# Patient Record
Sex: MALE | Race: OTHER | Employment: PART TIME | URBAN - METROPOLITAN AREA
[De-identification: names, ages, dates, MRNs, and addresses within clinical notes are randomized per-mention and may not be internally consistent; named-entity substitution may affect disease eponyms.]

---

## 2020-01-06 ENCOUNTER — HOSPITAL ENCOUNTER (OUTPATIENT)
Age: 70
Discharge: HOME | End: 2020-01-08
Payer: MEDICARE

## 2020-01-06 LAB — HBA1C MFR BLD: 7.6 % (ref 4–5.6)

## 2020-01-06 PROCEDURE — 83036 HEMOGLOBIN GLYCOSYLATED A1C: CPT

## 2020-12-18 ENCOUNTER — HOSPITAL ENCOUNTER (EMERGENCY)
Age: 70
Discharge: HOME | DRG: 177 | End: 2020-12-18
Attending: EMERGENCY MEDICINE
Payer: MEDICARE

## 2020-12-18 ENCOUNTER — APPOINTMENT (OUTPATIENT)
Dept: GENERAL RADIOLOGY | Age: 70
DRG: 177 | End: 2020-12-18
Payer: MEDICARE

## 2020-12-18 VITALS
BODY MASS INDEX: 28.49 KG/M2 | DIASTOLIC BLOOD PRESSURE: 72 MMHG | RESPIRATION RATE: 23 BRPM | TEMPERATURE: 98 F | SYSTOLIC BLOOD PRESSURE: 138 MMHG | OXYGEN SATURATION: 96 % | HEIGHT: 73 IN | HEART RATE: 74 BPM | WEIGHT: 215 LBS

## 2020-12-18 LAB
ALBUMIN SERPL-MCNC: 3.7 G/DL (ref 3.5–5.2)
ALP BLD-CCNC: 79 U/L (ref 40–129)
ALT SERPL-CCNC: 21 U/L (ref 0–40)
ANION GAP SERPL CALCULATED.3IONS-SCNC: 13 MMOL/L (ref 7–16)
AST SERPL-CCNC: 44 U/L (ref 0–39)
BASOPHILS ABSOLUTE: 0.03 E9/L (ref 0–0.2)
BASOPHILS RELATIVE PERCENT: 0.4 % (ref 0–2)
BILIRUB SERPL-MCNC: 0.5 MG/DL (ref 0–1.2)
BUN BLDV-MCNC: 22 MG/DL (ref 8–23)
CALCIUM SERPL-MCNC: 8.3 MG/DL (ref 8.6–10.2)
CHLORIDE BLD-SCNC: 98 MMOL/L (ref 98–107)
CO2: 21 MMOL/L (ref 22–29)
CREAT SERPL-MCNC: 1.1 MG/DL (ref 0.7–1.2)
EKG ATRIAL RATE: 72 BPM
EKG P AXIS: 4 DEGREES
EKG P-R INTERVAL: 166 MS
EKG Q-T INTERVAL: 390 MS
EKG QRS DURATION: 92 MS
EKG QTC CALCULATION (BAZETT): 427 MS
EKG R AXIS: -42 DEGREES
EKG T AXIS: 6 DEGREES
EKG VENTRICULAR RATE: 72 BPM
EOSINOPHILS ABSOLUTE: 0 E9/L (ref 0.05–0.5)
EOSINOPHILS RELATIVE PERCENT: 0 % (ref 0–6)
GFR AFRICAN AMERICAN: >60
GFR NON-AFRICAN AMERICAN: >60 ML/MIN/1.73
GLUCOSE BLD-MCNC: 195 MG/DL (ref 74–99)
HCT VFR BLD CALC: 41 % (ref 37–54)
HEMOGLOBIN: 13.1 G/DL (ref 12.5–16.5)
IMMATURE GRANULOCYTES #: 0.04 E9/L
IMMATURE GRANULOCYTES %: 0.5 % (ref 0–5)
LYMPHOCYTES ABSOLUTE: 1.42 E9/L (ref 1.5–4)
LYMPHOCYTES RELATIVE PERCENT: 17.2 % (ref 20–42)
MCH RBC QN AUTO: 24.1 PG (ref 26–35)
MCHC RBC AUTO-ENTMCNC: 32 % (ref 32–34.5)
MCV RBC AUTO: 75.4 FL (ref 80–99.9)
MONOCYTES ABSOLUTE: 0.79 E9/L (ref 0.1–0.95)
MONOCYTES RELATIVE PERCENT: 9.6 % (ref 2–12)
NEUTROPHILS ABSOLUTE: 5.96 E9/L (ref 1.8–7.3)
NEUTROPHILS RELATIVE PERCENT: 72.3 % (ref 43–80)
PDW BLD-RTO: 15.2 FL (ref 11.5–15)
PLATELET # BLD: 260 E9/L (ref 130–450)
PMV BLD AUTO: 11.5 FL (ref 7–12)
POTASSIUM REFLEX MAGNESIUM: 5.8 MMOL/L (ref 3.5–5)
POTASSIUM SERPL-SCNC: 4.2 MMOL/L (ref 3.5–5)
RBC # BLD: 5.44 E12/L (ref 3.8–5.8)
SARS-COV-2, NAAT: DETECTED
SODIUM BLD-SCNC: 132 MMOL/L (ref 132–146)
TOTAL PROTEIN: 7.2 G/DL (ref 6.4–8.3)
TROPONIN: <0.01 NG/ML (ref 0–0.03)
WBC # BLD: 8.2 E9/L (ref 4.5–11.5)

## 2020-12-18 PROCEDURE — 93005 ELECTROCARDIOGRAM TRACING: CPT | Performed by: EMERGENCY MEDICINE

## 2020-12-18 PROCEDURE — 2580000003 HC RX 258: Performed by: EMERGENCY MEDICINE

## 2020-12-18 PROCEDURE — 71045 X-RAY EXAM CHEST 1 VIEW: CPT

## 2020-12-18 PROCEDURE — 93010 ELECTROCARDIOGRAM REPORT: CPT | Performed by: INTERNAL MEDICINE

## 2020-12-18 PROCEDURE — 99284 EMERGENCY DEPT VISIT MOD MDM: CPT

## 2020-12-18 PROCEDURE — 85025 COMPLETE CBC W/AUTO DIFF WBC: CPT

## 2020-12-18 PROCEDURE — 84132 ASSAY OF SERUM POTASSIUM: CPT

## 2020-12-18 PROCEDURE — U0002 COVID-19 LAB TEST NON-CDC: HCPCS

## 2020-12-18 PROCEDURE — 80053 COMPREHEN METABOLIC PANEL: CPT

## 2020-12-18 PROCEDURE — 84484 ASSAY OF TROPONIN QUANT: CPT

## 2020-12-18 RX ORDER — 0.9 % SODIUM CHLORIDE 0.9 %
1000 INTRAVENOUS SOLUTION INTRAVENOUS ONCE
Status: COMPLETED | OUTPATIENT
Start: 2020-12-18 | End: 2020-12-18

## 2020-12-18 RX ORDER — AZITHROMYCIN 250 MG/1
TABLET, FILM COATED ORAL
Qty: 1 PACKET | Refills: 0 | Status: ON HOLD | OUTPATIENT
Start: 2020-12-18 | End: 2020-12-26 | Stop reason: HOSPADM

## 2020-12-18 RX ADMIN — SODIUM CHLORIDE 1000 ML: 9 INJECTION, SOLUTION INTRAVENOUS at 11:54

## 2020-12-18 NOTE — ED PROVIDER NOTES
HPI:  12/18/20, Time: 11:07 AM IMAN Erickson is a 69 y.o. male presenting to the ED for generalized fatigue, beginning 2 weeks ago. The complaint has been persistent, mild in severity, and worsened by nothing. Patient says that his significant other tested positive for COVID 2 weeks ago. He was never tested but has similar symptoms to her. He denies any chest pain, shortness of breath, cough. Has been eating and drinking. Denies any medical problems. Not on any medications. Only complaint is fatigue. ROS:   Pertinent positives and negatives are stated within HPI, all other systems reviewed and are negative.  --------------------------------------------- PAST HISTORY ---------------------------------------------  Past Medical History:  has no past medical history on file. Past Surgical History:  has no past surgical history on file. Social History:      Family History: family history is not on file. The patients home medications have been reviewed. Allergies: Patient has no known allergies. ---------------------------------------------------PHYSICAL EXAM--------------------------------------    Constitutional/General: Alert and oriented x3, well appearing, non toxic in NAD  Head: Normocephalic and atraumatic  Eyes: PERRL, EOMI  Mouth: Oropharynx clear, handling secretions, no trismus  Neck: Supple, full ROM, non tender to palpation in the midline, no stridor, no crepitus, no meningeal signs  Pulmonary: Lungs clear to auscultation bilaterally, no wheezes, rales, or rhonchi. Not in respiratory distress  Cardiovascular:  Regular rate. Regular rhythm. No murmurs, gallops, or rubs. 2+ distal pulses  Chest: no chest wall tenderness  Abdomen: Soft. Non tender. Non distended. +BS. No rebound, guarding, or rigidity. No pulsatile masses appreciated. Musculoskeletal: Moves all extremities x 4. Warm and well perfused, no clubbing, cyanosis, or edema.  Capillary refill <3 seconds  Skin: warm and dry. No rashes. Neurologic: GCS 15, CN 2-12 grossly intact, no focal deficits, symmetric strength 5/5 in the upper and lower extremities bilaterally  Psych: Normal Affect    -------------------------------------------------- RESULTS -------------------------------------------------  I have personally reviewed all laboratory and imaging results for this patient. Results are listed below.      LABS:  Results for orders placed or performed during the hospital encounter of 12/18/20   CBC Auto Differential   Result Value Ref Range    WBC 8.2 4.5 - 11.5 E9/L    RBC 5.44 3.80 - 5.80 E12/L    Hemoglobin 13.1 12.5 - 16.5 g/dL    Hematocrit 41.0 37.0 - 54.0 %    MCV 75.4 (L) 80.0 - 99.9 fL    MCH 24.1 (L) 26.0 - 35.0 pg    MCHC 32.0 32.0 - 34.5 %    RDW 15.2 (H) 11.5 - 15.0 fL    Platelets 691 363 - 908 E9/L    MPV 11.5 7.0 - 12.0 fL    Neutrophils % 72.3 43.0 - 80.0 %    Immature Granulocytes % 0.5 0.0 - 5.0 %    Lymphocytes % 17.2 (L) 20.0 - 42.0 %    Monocytes % 9.6 2.0 - 12.0 %    Eosinophils % 0.0 0.0 - 6.0 %    Basophils % 0.4 0.0 - 2.0 %    Neutrophils Absolute 5.96 1.80 - 7.30 E9/L    Immature Granulocytes # 0.04 E9/L    Lymphocytes Absolute 1.42 (L) 1.50 - 4.00 E9/L    Monocytes Absolute 0.79 0.10 - 0.95 E9/L    Eosinophils Absolute 0.00 (L) 0.05 - 0.50 E9/L    Basophils Absolute 0.03 0.00 - 0.20 E9/L   Comprehensive Metabolic Panel w/ Reflex to MG   Result Value Ref Range    Sodium 132 132 - 146 mmol/L    Potassium reflex Magnesium 5.8 (H) 3.5 - 5.0 mmol/L    Chloride 98 98 - 107 mmol/L    CO2 21 (L) 22 - 29 mmol/L    Anion Gap 13 7 - 16 mmol/L    Glucose 195 (H) 74 - 99 mg/dL    BUN 22 8 - 23 mg/dL    CREATININE 1.1 0.7 - 1.2 mg/dL    GFR Non-African American >60 >=60 mL/min/1.73    GFR African American >60     Calcium 8.3 (L) 8.6 - 10.2 mg/dL    Total Protein 7.2 6.4 - 8.3 g/dL    Alb 3.7 3.5 - 5.2 g/dL    Total Bilirubin 0.5 0.0 - 1.2 mg/dL    Alkaline Phosphatase 79 40 - 129 U/L    ALT 21 0 - 40 U/L    AST 44 (H) 0 - 39 U/L   Troponin   Result Value Ref Range    Troponin <0.01 0.00 - 0.03 ng/mL   COVID-19   Result Value Ref Range    SARS-CoV-2, NAAT DETECTED (A) Not Detected   Potassium   Result Value Ref Range    Potassium 4.2 3.5 - 5.0 mmol/L   EKG 12 Lead   Result Value Ref Range    Ventricular Rate 72 BPM    Atrial Rate 72 BPM    P-R Interval 166 ms    QRS Duration 92 ms    Q-T Interval 390 ms    QTc Calculation (Bazett) 427 ms    P Axis 4 degrees    R Axis -42 degrees    T Axis 6 degrees       RADIOLOGY:  Interpreted by Radiologist.  XR CHEST PORTABLE   Final Result   1. Patchy bilateral opacities, consistent with pneumonia. 2. Moderate size hiatus hernia. EKG Interpretation  Interpreted by emergency department physician    Rhythm: normal sinus   Rate: normal  Axis: left  Conduction: normal  ST Segments: no acute change  T Waves: no acute change    Clinical Impression: no acute changes  Comparison to prior EKG: None      ------------------------- NURSING NOTES AND VITALS REVIEWED ---------------------------   The nursing notes within the ED encounter and vital signs as below have been reviewed by myself. /83   Pulse 76   Temp 98 °F (36.7 °C) (Temporal)   Resp 20   Ht 6' 1\" (1.854 m)   Wt 215 lb (97.5 kg)   SpO2 97%   BMI 28.37 kg/m²   Oxygen Saturation Interpretation: Normal    The patients available past medical records and past encounters were reviewed. ------------------------------ ED COURSE/MEDICAL DECISION MAKING----------------------  Medications   0.9 % sodium chloride bolus (1,000 mLs Intravenous New Bag 12/18/20 1154)             Medical Decision Making:    Patient's vitals are stable. Oxygen saturation is 97% on RA. Patient complains of fatigue. Labs and imaging obtained. COVID positive. CXR shows infiltrates. Likely related to COVID but I will start the patient on azithromycin.  I instructed the patient to take the medication prescribed as directed, to follow up with his PCP this week, and to return for worsening symptoms. Re-Evaluations:             Re-evaluation. Patients symptoms are improving        This patient's ED course included: a personal history and physicial examination, re-evaluation prior to disposition, multiple bedside re-evaluations, IV medications, cardiac monitoring, continuous pulse oximetry and a personal history and physicial eaxmination    This patient has remained hemodynamically stable during their ED course. Counseling: The emergency provider has spoken with the patient and discussed todays results, in addition to providing specific details for the plan of care and counseling regarding the diagnosis and prognosis. Questions are answered at this time and they are agreeable with the plan.       --------------------------------- IMPRESSION AND DISPOSITION ---------------------------------    IMPRESSION  1. COVID-19        DISPOSITION  Disposition: Discharge to home  Patient condition is good        NOTE: This report was transcribed using voice recognition software.  Every effort was made to ensure accuracy; however, inadvertent computerized transcription errors may be present          Desire Gonzalez MD  12/18/20 8418       Desire Gonzalez MD  12/26/20 0035

## 2020-12-19 ENCOUNTER — CARE COORDINATION (OUTPATIENT)
Dept: CARE COORDINATION | Age: 70
End: 2020-12-19

## 2020-12-20 ENCOUNTER — CARE COORDINATION (OUTPATIENT)
Dept: CARE COORDINATION | Age: 70
End: 2020-12-20

## 2020-12-20 NOTE — CARE COORDINATION
Patient contacted regarding Ermelinda Izaiahas. Discussed COVID-19 related testing which was available at this time. Test results were positive. Patient informed of results, if available? Yes    Care Transition Nurse/ Ambulatory Care Manager contacted the patient by telephone to perform post discharge assessment. Call within 2 business days of discharge: Yes. Verified name and  with patient as identifiers. Provided introduction to self, and explanation of the CTN/ACM role, and reason for call due to risk factors for infection and/or exposure to COVID-19. Symptoms reviewed with patient who verbalized the following symptoms: fatigue and shortness of breath. Patient states his symptoms persist.   ACM reviewed s/s of respiratory distress and when to contact a healthcare provider with patient. Patient states understanding. Due to no new or worsening symptoms encounter was not routed to provider for escalation. Discussed follow-up appointments. If no appointment was previously scheduled, appointment scheduling offered: Yes  [facility] follow up appointment(s): No future appointments. Non-[facility] follow up appointment(s):   Patient does not have a PCP at this time. ACM gave him the information for the Pre-service center and the information for the [facility] on [address].   Patient will call this ACM if he needs assistance in scheduling a follow up appointment. Non-face-to-face services provided:  Reviewed AVS, gave information for the [facility], [facility] in [address] [place] [address] Drive:   Does patient have an Advance Directive:  decision maker updated. Patient has following risk factors of: pneumonia. CTN/ACM reviewed discharge instructions, medical action plan and red flags such as increased shortness of breath, increasing fever and signs of decompensation with patient who verbalized understanding.    Discussed exposure protocols and quarantine with CDC Guidelines What to do if you are sick with coronavirus disease 2019.  Patient was given an opportunity for questions and concerns. The patient agrees to contact the [facility] exposure line [phone], local [facility] department [place] [facility]: ([phone]) and PCP office for questions related to their healthcare. CTN/ACM provided contact information for future needs. Reviewed and educated patient on any new and changed medications related to discharge diagnosis     Patient states he is taking the medication (Zithromax) prescribed by the ED provider. Per patient request, ACM did send the link to sign up for my chart to his email address and also gave him the contact information for My Chart staff to call if he has problems activating his account. Patient/family/caregiver given information for [employer] and agrees to enroll yes  Patient's preferred e-mail: [email or web address]   Patient's preferred phone number: [phone]  Based on Loop alert triggers, patient will be contacted by nurse care manager for worsening symptoms. Pt will be further monitored by COVID Loop Team based on severity of symptoms and risk factors. ACM reviewed self quarantine recommendations d/t COVID test results are pending. Steps to help prevent the spread of COVID-19 if you are sick  SOURCE - https://gray-cole.info/. html     Stay home except to get medical care   ; Stay home: People who are mildly ill with COVID-19 are able to isolate at home during their illness. You should restrict activities outside your home, except for getting medical care.   ; Avoid public areas: Do not go to work, school, or public areas.   ; Avoid public transportation: Avoid using public transportation, ride-sharing, or taxis.  ; Separate yourself from other people and animals in your home   ; Stay away from others: As much as possible, you should stay in a specific room and away from other people in your home.  Also, you should use a separate bathroom, if available.   ; Limit contact with pets & animals: You should restrict contact with pets and other animals while you are sick with COVID-19, just like you would around other people. Although there have not been reports of pets or other animals becoming sick with COVID-19, it is still recommended that people sick with COVID-19 limit contact with animals until more information is known about the virus. ; When possible, have another member of your household care for your animals while you are sick. If you are sick with COVID-19, avoid contact with your pet, including petting, snuggling, being kissed or licked, and sharing food. If you must care for your pet or be around animals while you are sick, wash your hands before and after you interact with pets and wear a facemask. See COVID-19 and Animals for more information. Other considerations   The ill person should eat/be fed in their room if possible. Non-disposable  items used should be handled with gloves and washed with hot water or in a . Clean hands after handling used  items.  If possible, dedicate a lined trash can for the ill person. Use gloves when removing garbage bags, handling, and disposing of trash. Wash hands after handling or disposing of trash.  Consider consulting with your local health department about trash disposal guidance if available. Information for Household Members and Caregivers of Someone who is Sick   Call ahead before visiting your doctor   Call ahead: If you have a medical appointment, call the healthcare provider and tell them that you have or may have COVID-19. This will help the healthcare provider's office take steps to keep other people from getting infected or exposed. Wear a facemask if you are sick   ;  If you are sick: You should wear a facemask when you are around other people (e.g., sharing a room or vehicle) or pets and before you enter a healthcare provider's office. ; If you are caring for others: If the person who is sick is not able to wear a facemask (for example, because it causes trouble breathing), then people who live with the person who is sick should not stay in the same room with them, or they should wear a facemask if they enter a room with the person who is sick. Cover your coughs and sneezes   ; Cover: Cover your mouth and nose with a tissue when you cough or sneeze.   ; Dispose: Throw used tissues in a lined trash can.   ; Wash hands: Immediately wash your hands with soap and water for at least 20 seconds or, if soap and water are not available, clean your hands with an alcohol-based hand  that contains at least 60% alcohol. Clean your hands often   ; Wash hands: Wash your hands often with soap and water for at least 20 seconds, especially after blowing your nose, coughing, or sneezing; going to the bathroom; and before eating or preparing food.   ; Hand : If soap and water are not readily available, use an alcohol-based hand  with at least 60% alcohol, covering all surfaces of your hands and rubbing them together until they feel dry.   ; Soap and water: Soap and water are the best option if hands are visibly dirty.   ; Avoid touching: Avoid touching your eyes, nose, and mouth with unwashed hands. Handwashing Tips   ; Wet your hands with clean, running water (warm or cold), turn off the tap, and apply soap.  ; Lather your hands by rubbing them together with the soap. Lather the backs of your hands, between your fingers, and under your nails. ; Scrub your hands for at least 20 seconds. Need a timer? Hum the [place] from beginning to end twice.  ; Rinse your hands well under clean, running water.  ; Dry your hands using a clean towel or air dry them. Avoid sharing personal household items   ; Do not share:  You should not share dishes, drinking glasses, cups, eating utensils, towels, or bedding with other people or pets in your home.   ; Wash thoroughly after use: After using these items, they should be washed thoroughly with soap and water. Clean all high-touch surfaces everyday   ; Clean and disinfect: Practice routine cleaning of high touch surfaces.  ; High touch surfaces include counters, tabletops, doorknobs, bathroom fixtures, toilets, phones, keyboards, tablets, and bedside tables.  ; Disinfect areas with bodily fluids: Also, clean any surfaces that may have blood, stool, or body fluids on them.   ; Household : Use a household cleaning spray or wipe, according to the label instructions. Labels contain instructions for safe and effective use of the cleaning product including precautions you should take when applying the product, such as wearing gloves and making sure you have good ventilation during use of the product. Monitor your symptoms   Seek medical attention: Seek prompt medical attention if your illness is worsening     (e.g., difficulty breathing).   ; Call your doctor: Before seeking care, call your healthcare provider and tell them that you have, or are being evaluated for, COVID-19.   ; Wear a facemask when sick: Put on a facemask before you enter the facility. These steps will help the healthcare provider's office to keep other people in the office or waiting room from getting infected or exposed. ; Alert health department: Ask your healthcare provider to call the local or state health department. Persons who are placed under active monitoring or facilitated self-monitoring should follow instructions provided by their local health department or occupational health professionals, as appropriate.  ; Call 911 if you have a medical emergency: If you have a medical emergency and need to call 911, notify the dispatch personnel that you have, or are being evaluated for COVID-19. If possible, put on a facemask before emergency medical services arrive.

## 2020-12-21 ENCOUNTER — APPOINTMENT (OUTPATIENT)
Dept: CT IMAGING | Age: 70
DRG: 177 | End: 2020-12-21
Payer: MEDICARE

## 2020-12-21 ENCOUNTER — HOSPITAL ENCOUNTER (INPATIENT)
Age: 70
LOS: 7 days | Discharge: HOME | DRG: 177 | End: 2020-12-28
Attending: EMERGENCY MEDICINE | Admitting: FAMILY MEDICINE
Payer: MEDICARE

## 2020-12-21 ENCOUNTER — APPOINTMENT (OUTPATIENT)
Dept: GENERAL RADIOLOGY | Age: 70
DRG: 177 | End: 2020-12-21
Payer: MEDICARE

## 2020-12-21 PROBLEM — J12.82 PNEUMONIA DUE TO COVID-19 VIRUS: Status: ACTIVE | Noted: 2020-12-21

## 2020-12-21 PROBLEM — U07.1 PNEUMONIA DUE TO COVID-19 VIRUS: Status: ACTIVE | Noted: 2020-12-21

## 2020-12-21 LAB
ALBUMIN SERPL-MCNC: 3.1 G/DL (ref 3.5–5.2)
ALP BLD-CCNC: 80 U/L (ref 40–129)
ALT SERPL-CCNC: 18 U/L (ref 0–40)
ANION GAP SERPL CALCULATED.3IONS-SCNC: 16 MMOL/L (ref 7–16)
AST SERPL-CCNC: 33 U/L (ref 0–39)
BASOPHILS ABSOLUTE: 0.02 E9/L (ref 0–0.2)
BASOPHILS RELATIVE PERCENT: 0.1 % (ref 0–2)
BILIRUB SERPL-MCNC: 0.5 MG/DL (ref 0–1.2)
BUN BLDV-MCNC: 24 MG/DL (ref 8–23)
CALCIUM SERPL-MCNC: 8.7 MG/DL (ref 8.6–10.2)
CHLORIDE BLD-SCNC: 100 MMOL/L (ref 98–107)
CO2: 19 MMOL/L (ref 22–29)
CREAT SERPL-MCNC: 1.1 MG/DL (ref 0.7–1.2)
D DIMER: 2713 NG/ML DDU
EOSINOPHILS ABSOLUTE: 0 E9/L (ref 0.05–0.5)
EOSINOPHILS RELATIVE PERCENT: 0 % (ref 0–6)
GFR AFRICAN AMERICAN: >60
GFR NON-AFRICAN AMERICAN: >60 ML/MIN/1.73
GLUCOSE BLD-MCNC: 278 MG/DL (ref 74–99)
HCT VFR BLD CALC: 41.1 % (ref 37–54)
HEMOGLOBIN: 12.9 G/DL (ref 12.5–16.5)
IMMATURE GRANULOCYTES #: 0.1 E9/L
IMMATURE GRANULOCYTES %: 0.7 % (ref 0–5)
LYMPHOCYTES ABSOLUTE: 0.82 E9/L (ref 1.5–4)
LYMPHOCYTES RELATIVE PERCENT: 6.1 % (ref 20–42)
MCH RBC QN AUTO: 23.8 PG (ref 26–35)
MCHC RBC AUTO-ENTMCNC: 31.4 % (ref 32–34.5)
MCV RBC AUTO: 76 FL (ref 80–99.9)
METER GLUCOSE: 325 MG/DL (ref 74–99)
METER GLUCOSE: 349 MG/DL (ref 74–99)
MONOCYTES ABSOLUTE: 1 E9/L (ref 0.1–0.95)
MONOCYTES RELATIVE PERCENT: 7.4 % (ref 2–12)
NEUTROPHILS ABSOLUTE: 11.55 E9/L (ref 1.8–7.3)
NEUTROPHILS RELATIVE PERCENT: 85.7 % (ref 43–80)
PDW BLD-RTO: 15 FL (ref 11.5–15)
PLATELET # BLD: 299 E9/L (ref 130–450)
PMV BLD AUTO: 11.5 FL (ref 7–12)
POTASSIUM SERPL-SCNC: 5.1 MMOL/L (ref 3.5–5)
PRO-BNP: 72 PG/ML (ref 0–125)
PROCALCITONIN: 0.09 NG/ML (ref 0–0.08)
RBC # BLD: 5.41 E12/L (ref 3.8–5.8)
SODIUM BLD-SCNC: 135 MMOL/L (ref 132–146)
TOTAL PROTEIN: 6.8 G/DL (ref 6.4–8.3)
TROPONIN: <0.01 NG/ML (ref 0–0.03)
WBC # BLD: 13.5 E9/L (ref 4.5–11.5)

## 2020-12-21 PROCEDURE — 99283 EMERGENCY DEPT VISIT LOW MDM: CPT

## 2020-12-21 PROCEDURE — 6360000002 HC RX W HCPCS: Performed by: EMERGENCY MEDICINE

## 2020-12-21 PROCEDURE — 71045 X-RAY EXAM CHEST 1 VIEW: CPT

## 2020-12-21 PROCEDURE — 85025 COMPLETE CBC W/AUTO DIFF WBC: CPT

## 2020-12-21 PROCEDURE — 83880 ASSAY OF NATRIURETIC PEPTIDE: CPT

## 2020-12-21 PROCEDURE — 6370000000 HC RX 637 (ALT 250 FOR IP): Performed by: FAMILY MEDICINE

## 2020-12-21 PROCEDURE — 6360000002 HC RX W HCPCS: Performed by: FAMILY MEDICINE

## 2020-12-21 PROCEDURE — 82962 GLUCOSE BLOOD TEST: CPT

## 2020-12-21 PROCEDURE — 84484 ASSAY OF TROPONIN QUANT: CPT

## 2020-12-21 PROCEDURE — 2500000003 HC RX 250 WO HCPCS: Performed by: FAMILY MEDICINE

## 2020-12-21 PROCEDURE — 80053 COMPREHEN METABOLIC PANEL: CPT

## 2020-12-21 PROCEDURE — 6360000004 HC RX CONTRAST MEDICATION: Performed by: RADIOLOGY

## 2020-12-21 PROCEDURE — 2060000000 HC ICU INTERMEDIATE R&B

## 2020-12-21 PROCEDURE — 85378 FIBRIN DEGRADE SEMIQUANT: CPT

## 2020-12-21 PROCEDURE — 93005 ELECTROCARDIOGRAM TRACING: CPT | Performed by: EMERGENCY MEDICINE

## 2020-12-21 PROCEDURE — 84145 PROCALCITONIN (PCT): CPT

## 2020-12-21 PROCEDURE — 2580000003 HC RX 258: Performed by: FAMILY MEDICINE

## 2020-12-21 PROCEDURE — 71275 CT ANGIOGRAPHY CHEST: CPT

## 2020-12-21 RX ORDER — ASCORBIC ACID 500 MG
500 TABLET ORAL DAILY
Status: DISCONTINUED | OUTPATIENT
Start: 2020-12-21 | End: 2020-12-28 | Stop reason: HOSPADM

## 2020-12-21 RX ORDER — POTASSIUM CHLORIDE 20 MEQ/1
40 TABLET, EXTENDED RELEASE ORAL PRN
Status: DISCONTINUED | OUTPATIENT
Start: 2020-12-21 | End: 2020-12-28 | Stop reason: HOSPADM

## 2020-12-21 RX ORDER — SODIUM CHLORIDE 0.9 % (FLUSH) 0.9 %
10 SYRINGE (ML) INJECTION PRN
Status: DISCONTINUED | OUTPATIENT
Start: 2020-12-21 | End: 2020-12-28 | Stop reason: HOSPADM

## 2020-12-21 RX ORDER — POTASSIUM CHLORIDE 7.45 MG/ML
10 INJECTION INTRAVENOUS PRN
Status: DISCONTINUED | OUTPATIENT
Start: 2020-12-21 | End: 2020-12-28 | Stop reason: HOSPADM

## 2020-12-21 RX ORDER — DEXAMETHASONE 4 MG/1
6 TABLET ORAL DAILY
Status: DISCONTINUED | OUTPATIENT
Start: 2020-12-21 | End: 2020-12-28 | Stop reason: HOSPADM

## 2020-12-21 RX ORDER — 0.9 % SODIUM CHLORIDE 0.9 %
30 INTRAVENOUS SOLUTION INTRAVENOUS PRN
Status: DISCONTINUED | OUTPATIENT
Start: 2020-12-21 | End: 2020-12-28 | Stop reason: HOSPADM

## 2020-12-21 RX ORDER — POLYETHYLENE GLYCOL 3350 17 G/17G
17 POWDER, FOR SOLUTION ORAL DAILY PRN
Status: DISCONTINUED | OUTPATIENT
Start: 2020-12-21 | End: 2020-12-28 | Stop reason: HOSPADM

## 2020-12-21 RX ORDER — PROMETHAZINE HYDROCHLORIDE 25 MG/1
12.5 TABLET ORAL EVERY 6 HOURS PRN
Status: DISCONTINUED | OUTPATIENT
Start: 2020-12-21 | End: 2020-12-28 | Stop reason: HOSPADM

## 2020-12-21 RX ORDER — ZINC SULFATE 50(220)MG
50 CAPSULE ORAL DAILY
Status: DISCONTINUED | OUTPATIENT
Start: 2020-12-21 | End: 2020-12-28 | Stop reason: HOSPADM

## 2020-12-21 RX ORDER — MAGNESIUM SULFATE IN WATER 40 MG/ML
2 INJECTION, SOLUTION INTRAVENOUS PRN
Status: DISCONTINUED | OUTPATIENT
Start: 2020-12-21 | End: 2020-12-28 | Stop reason: HOSPADM

## 2020-12-21 RX ORDER — SODIUM CHLORIDE 0.9 % (FLUSH) 0.9 %
10 SYRINGE (ML) INJECTION EVERY 12 HOURS SCHEDULED
Status: DISCONTINUED | OUTPATIENT
Start: 2020-12-21 | End: 2020-12-28 | Stop reason: HOSPADM

## 2020-12-21 RX ORDER — DEXAMETHASONE SODIUM PHOSPHATE 10 MG/ML
6 INJECTION INTRAMUSCULAR; INTRAVENOUS ONCE
Status: COMPLETED | OUTPATIENT
Start: 2020-12-21 | End: 2020-12-21

## 2020-12-21 RX ORDER — INSULIN GLARGINE 100 [IU]/ML
0.25 INJECTION, SOLUTION SUBCUTANEOUS NIGHTLY
Status: DISCONTINUED | OUTPATIENT
Start: 2020-12-21 | End: 2020-12-28 | Stop reason: HOSPADM

## 2020-12-21 RX ORDER — DEXTROSE MONOHYDRATE 25 G/50ML
12.5 INJECTION, SOLUTION INTRAVENOUS PRN
Status: DISCONTINUED | OUTPATIENT
Start: 2020-12-21 | End: 2020-12-28 | Stop reason: HOSPADM

## 2020-12-21 RX ORDER — DEXTROSE MONOHYDRATE 50 MG/ML
100 INJECTION, SOLUTION INTRAVENOUS PRN
Status: DISCONTINUED | OUTPATIENT
Start: 2020-12-21 | End: 2020-12-28 | Stop reason: HOSPADM

## 2020-12-21 RX ORDER — ONDANSETRON 2 MG/ML
4 INJECTION INTRAMUSCULAR; INTRAVENOUS EVERY 6 HOURS PRN
Status: DISCONTINUED | OUTPATIENT
Start: 2020-12-21 | End: 2020-12-28 | Stop reason: HOSPADM

## 2020-12-21 RX ORDER — ACETAMINOPHEN 325 MG/1
650 TABLET ORAL EVERY 6 HOURS PRN
Status: DISCONTINUED | OUTPATIENT
Start: 2020-12-21 | End: 2020-12-28 | Stop reason: HOSPADM

## 2020-12-21 RX ORDER — VITAMIN B COMPLEX
1000 TABLET ORAL DAILY
Status: DISCONTINUED | OUTPATIENT
Start: 2020-12-21 | End: 2020-12-28 | Stop reason: HOSPADM

## 2020-12-21 RX ORDER — ACETAMINOPHEN 650 MG/1
650 SUPPOSITORY RECTAL EVERY 6 HOURS PRN
Status: DISCONTINUED | OUTPATIENT
Start: 2020-12-21 | End: 2020-12-28 | Stop reason: HOSPADM

## 2020-12-21 RX ORDER — NICOTINE POLACRILEX 4 MG
15 LOZENGE BUCCAL PRN
Status: DISCONTINUED | OUTPATIENT
Start: 2020-12-21 | End: 2020-12-28 | Stop reason: HOSPADM

## 2020-12-21 RX ADMIN — ENOXAPARIN SODIUM 100 MG: 100 INJECTION SUBCUTANEOUS at 17:49

## 2020-12-21 RX ADMIN — Medication 1000 UNITS: at 17:51

## 2020-12-21 RX ADMIN — Medication 50 MG: at 17:51

## 2020-12-21 RX ADMIN — INSULIN LISPRO 2 UNITS: 100 INJECTION, SOLUTION INTRAVENOUS; SUBCUTANEOUS at 21:36

## 2020-12-21 RX ADMIN — IOPAMIDOL 75 ML: 755 INJECTION, SOLUTION INTRAVENOUS at 14:56

## 2020-12-21 RX ADMIN — REMDESIVIR 200 MG: 100 INJECTION, POWDER, LYOPHILIZED, FOR SOLUTION INTRAVENOUS at 17:52

## 2020-12-21 RX ADMIN — OXYCODONE HYDROCHLORIDE AND ACETAMINOPHEN 500 MG: 500 TABLET ORAL at 17:51

## 2020-12-21 RX ADMIN — Medication 10 ML: at 21:33

## 2020-12-21 RX ADMIN — DEXAMETHASONE SODIUM PHOSPHATE 6 MG: 10 INJECTION INTRAMUSCULAR; INTRAVENOUS at 13:37

## 2020-12-21 RX ADMIN — INSULIN LISPRO 4 UNITS: 100 INJECTION, SOLUTION INTRAVENOUS; SUBCUTANEOUS at 18:14

## 2020-12-21 RX ADMIN — DEXAMETHASONE 6 MG: 4 TABLET ORAL at 17:51

## 2020-12-21 RX ADMIN — INSULIN GLARGINE 24 UNITS: 100 INJECTION, SOLUTION SUBCUTANEOUS at 21:34

## 2020-12-21 ASSESSMENT — PAIN SCALES - GENERAL: PAINLEVEL_OUTOF10: 0

## 2020-12-21 NOTE — PROGRESS NOTES
Remdesivir Initiation Note    This patient meets criteria for initiation of remdesivir based on the following:  · Proven COVID-19  · Moderate disease (Requiring supplemental O2)  · Acceptable hepatic function (ALT within 5 times ULN)    Exclusion Criteria:  ? Severe disease requiring invasive or non-invasive mechanical ventilation (includes HFNC & BiPAP)  ? Could consider use in patients requiring high flow if early on in the disease course (based on symptom duration)  ? Use of more than 1 vasopressor prior to remdesivir initiation  ? Already improving on supportive treatment and/or impending discharge  ? Patients in whom the clinical team think death is in the immediate short-term where remdesivir is unlikely to change the clinical outcome     Liver function tests will be monitored daily while on remdesivir. CMP ordered daily per protocol. Thank you,  Eyal Jacinto.  Kimi Miller, PharmD 12/21/2020 3:30 PM

## 2020-12-21 NOTE — H&P
REVIEW OF SYSTEMS:   Pertinent positives as noted in the HPI. All other systems reviewed and negative. PHYSICAL EXAM:  BP (!) 146/67   Pulse 85   Temp 97.1 °F (36.2 °C) (Temporal)   Resp 20   Ht 6' 1\" (1.854 m)   Wt 215 lb (97.5 kg)   SpO2 96%   BMI 28.37 kg/m²   General appearance: No apparent distress, appears stated age and cooperative. HEENT: Normal cephalic, atraumatic without obvious deformity. Pupils equal, round, and reactive to light. Extra ocular muscles intact. Conjunctivae/corneas clear. Neck: Supple, with full range of motion. No jugular venous distention. Trachea midline. Respiratory: Clear bilaterally  Cardiovascular: Regular rate and rhythm  Abdomen: Soft, nontender, nondistended  Musculoskeletal: No clubbing, cyanosis, edema of bilateral lower extremities. Brisk capillary refill. Skin: Normal skin color. No rashes or lesions. Neurologic:  Neurovascularly intact without any focal sensory/motor deficits. Cranial nerves: II-XII intact, grossly non-focal.    CBC:   Recent Labs     12/21/20  1201   WBC 13.5*   RBC 5.41   HGB 12.9   HCT 41.1   MCV 76.0*   RDW 15.0        BMP:   Recent Labs     12/21/20  1201      K 5.1*      CO2 19*   BUN 24*   CREATININE 1.1     LFT:  Recent Labs     12/21/20  1201   PROT 6.8   ALKPHOS 80   ALT 18   AST 33   BILITOT 0.5     CE:  Recent Labs     12/21/20  1201   TROPONINI <0.01     PT/INR: No results for input(s): INR, APTT in the last 72 hours. BNP: No results for input(s): BNP in the last 72 hours.   ESR: No results found for: SEDRATE  CRP: No results found for: CRP  D Dimer:   Lab Results   Component Value Date    DDIMER 2713 12/21/2020      Folate and B12: No results found for: RIVYBHXH14, No results found for: FOLATE  Lactic Acid: No results found for: LACTA  Thyroid Studies: No results found for: TSH, R5OBMHB, H1YFTET, THYROIDAB    Oupatient labs:  Lab Results   Component Value Date    LABA1C 7.6 (H) 01/06/2020 Urinalysis:  No results found for: NITRU, WBCUA, BACTERIA, RBCUA, BLOODU, SPECGRAV, GLUCOSEU    Imaging:  Xr Chest Portable    Result Date: 12/21/2020  EXAMINATION: ONE XRAY VIEW OF THE CHEST 12/21/2020 12:27 pm COMPARISON: 12/18/2020 HISTORY: ORDERING SYSTEM PROVIDED HISTORY: Shortness of breath TECHNOLOGIST PROVIDED HISTORY: Reason for exam:->Shortness of breath What reading provider will be dictating this exam?->CRC FINDINGS: There are patchy multifocal bilateral pulmonary infiltrates. There is no pleural effusion The heart is not enlarged. 1. Patchy multifocal bilateral pulmonary infiltrates unchanged compared with the prior study. Xr Chest Portable    Result Date: 12/18/2020  EXAMINATION: ONE XRAY VIEW OF THE CHEST 12/18/2020 10:56 am COMPARISON: None. HISTORY: ORDERING SYSTEM PROVIDED HISTORY: COVID TECHNOLOGIST PROVIDED HISTORY: Reason for exam:->COVID FINDINGS: Subtle opacities are seen at the lateral aspect of the mid and lower lung fields bilaterally, suspicious for pneumonia. The upper lung fields are clear. There is no evidence of pleural effusion. Moderate size hiatus hernia is noted. The heart and mediastinum are otherwise unremarkable with no evidence of vascular congestion. 1. Patchy bilateral opacities, consistent with pneumonia. 2. Moderate size hiatus hernia.       ASSESSMENT:  COVID-19 pneumonia  Acute hypoxic respiratory failure  Hyperglycemia likely 2/2 undiagnosed diabetes  Leukocytosis  Elevated D-dimer      PLAN:  Admit to medicine  CTA pulmonary pending  Decadron 6 mg daily  Pharmacy to dose remdesivir  Zinc, vitamin C, vitamin D  Monitor serial inflammatory markers  Check procalcitonin, no antibiotics for now  DVT prophylaxis Lovenox 30 mg twice daily        Diet: No diet orders on file  Code Status: No Order  Surrogate decision maker confirmed with patient:   Extended Emergency Contact Information  Primary Emergency Contact: None,None Per Pt  Address: [id number] [facility] ADELAIDA Guerrero [address] Phone: [phone]  Relation: Other  Secondary Emergency Contact: [address] [facility], [address] Phone: [phone]  Relation: Other   needed? No      DVT Prophylaxis: []Lovenox []Heparin []PCD [] [address] []Encouraged ambulation  Disposition: []Med/Surg [] Intermediate [] ICU/CCU  Admit status: [] Observation [] Inpatient     +++++++++++++++++++++++++++++++++++++++++++++++++  NOTE: This report was transcribed using voice recognition software. Every effort was made to ensure accuracy; however, inadvertent computerized transcription errors may be present.

## 2020-12-22 LAB
METER GLUCOSE: 275 MG/DL (ref 74–99)
METER GLUCOSE: 305 MG/DL (ref 74–99)
METER GLUCOSE: 311 MG/DL (ref 74–99)
METER GLUCOSE: 335 MG/DL (ref 74–99)

## 2020-12-22 PROCEDURE — 2700000000 HC OXYGEN THERAPY PER DAY

## 2020-12-22 PROCEDURE — 2060000000 HC ICU INTERMEDIATE R&B

## 2020-12-22 PROCEDURE — 6370000000 HC RX 637 (ALT 250 FOR IP): Performed by: FAMILY MEDICINE

## 2020-12-22 PROCEDURE — 2500000003 HC RX 250 WO HCPCS: Performed by: FAMILY MEDICINE

## 2020-12-22 PROCEDURE — XW033E5 INTRODUCTION OF REMDESIVIR ANTI-INFECTIVE INTO PERIPHERAL VEIN, PERCUTANEOUS APPROACH, NEW TECHNOLOGY GROUP 5: ICD-10-PCS | Performed by: FAMILY MEDICINE

## 2020-12-22 PROCEDURE — 6360000002 HC RX W HCPCS: Performed by: FAMILY MEDICINE

## 2020-12-22 PROCEDURE — 2580000003 HC RX 258: Performed by: FAMILY MEDICINE

## 2020-12-22 PROCEDURE — 82962 GLUCOSE BLOOD TEST: CPT

## 2020-12-22 RX ORDER — DIAPER,BRIEF,INFANT-TODD,DISP
EACH MISCELLANEOUS 3 TIMES DAILY
Status: DISCONTINUED | OUTPATIENT
Start: 2020-12-22 | End: 2020-12-25

## 2020-12-22 RX ADMIN — ENOXAPARIN SODIUM 100 MG: 100 INJECTION SUBCUTANEOUS at 04:36

## 2020-12-22 RX ADMIN — INSULIN LISPRO 4 UNITS: 100 INJECTION, SOLUTION INTRAVENOUS; SUBCUTANEOUS at 06:30

## 2020-12-22 RX ADMIN — Medication 10 ML: at 23:03

## 2020-12-22 RX ADMIN — REMDESIVIR 100 MG: 100 INJECTION, POWDER, LYOPHILIZED, FOR SOLUTION INTRAVENOUS at 18:48

## 2020-12-22 RX ADMIN — Medication 10 ML: at 10:11

## 2020-12-22 RX ADMIN — INSULIN GLARGINE 24 UNITS: 100 INJECTION, SOLUTION SUBCUTANEOUS at 22:54

## 2020-12-22 RX ADMIN — HYDROCORTISONE: 1 CREAM TOPICAL at 22:42

## 2020-12-22 RX ADMIN — INSULIN LISPRO 3 UNITS: 100 INJECTION, SOLUTION INTRAVENOUS; SUBCUTANEOUS at 11:59

## 2020-12-22 RX ADMIN — DEXAMETHASONE 6 MG: 4 TABLET ORAL at 10:09

## 2020-12-22 RX ADMIN — Medication 1000 UNITS: at 10:09

## 2020-12-22 RX ADMIN — INSULIN LISPRO 2 UNITS: 100 INJECTION, SOLUTION INTRAVENOUS; SUBCUTANEOUS at 22:55

## 2020-12-22 RX ADMIN — SODIUM CHLORIDE 30 ML: 9 INJECTION, SOLUTION INTRAVENOUS at 18:48

## 2020-12-22 RX ADMIN — Medication 50 MG: at 10:09

## 2020-12-22 RX ADMIN — OXYCODONE HYDROCHLORIDE AND ACETAMINOPHEN 500 MG: 500 TABLET ORAL at 10:09

## 2020-12-22 RX ADMIN — ENOXAPARIN SODIUM 100 MG: 100 INJECTION SUBCUTANEOUS at 17:04

## 2020-12-22 RX ADMIN — INSULIN LISPRO 4 UNITS: 100 INJECTION, SOLUTION INTRAVENOUS; SUBCUTANEOUS at 16:40

## 2020-12-22 ASSESSMENT — PAIN SCALES - GENERAL
PAINLEVEL_OUTOF10: 0
PAINLEVEL_OUTOF10: 0

## 2020-12-22 NOTE — CARE COORDINATION
Multiple attempts to call pt however, room phone busy. I called pt's significant other with whom he lives with Alin Mena however, stated to call his cell phone. I did reach pt by way of his cell however, stated he had to go partially through conversation. I will follow up face to face with pt tomorrow. I did derive the following information, pt from home with significant other; denies DME or needs; active . I did ask RN taking over for day shift to please wean pt's O2 since his sat is 97% on 4 liters and reports he wears none at home. Will follow up tomorrow, tentative discharge plan is to return home, no needs identified.

## 2020-12-22 NOTE — PROGRESS NOTES
Hospitalist Progress Note      SYNOPSIS: Patient admitted on 2020   Patient diagnosed with COVID-19 on the . Was recovering at home when he became short of breath. Presents the ER now with complaints of shortness of breath worse with exertion. Does not wear oxygen at baseline. Denies fever, chills, nausea, vomiting, chest pain, abdominal pain or diarrhea.     Vital signs assessed in emergency department within normal and stable with the exception of an oxygen saturations which are in mid 80s on room air. Physical exam unremarkable. Laboratory studies notable for potassium 5.1, glucose 278, WBC 13.5 and D-dimer 2713. Chest x-ray reveals patchy multifocal bilateral pulmonary infiltrates unchanged from study on . CT revealed pulmonary emboli in the proximal right middle lobe pulmonary artery with some additional tiny peripheral emboli in the medial segment of the right middle lobe. SUBJECTIVE:    Patient seen and examined  Records reviewed. No acute events overnight     Temp (24hrs), Av.7 °F (36.5 °C), Min:96.4 °F (35.8 °C), Max:98.8 °F (37.1 °C)    DIET: DIET GENERAL;  CODE: DNR-CCA    Intake/Output Summary (Last 24 hours) at 2020 1018  Last data filed at 2020 1907  Gross per 24 hour   Intake    Output 100 ml   Net -100 ml       OBJECTIVE:    /77   Pulse 66   Temp 97.7 °F (36.5 °C) (Oral)   Resp 20   Ht 6' 1\" (1.854 m)   Wt 215 lb (97.5 kg)   SpO2 97%   BMI 28.37 kg/m²     General appearance: No apparent distress, appears stated age and cooperative. HEENT: Normocephalic, atraumatic. EOMI  Neck: Supple. No jugular venous distention. Respiratory: Clear to auscultation bilaterally, normal respiratory effort  Cardiovascular: Regular rate rhythm, normal S1-S2  Abdomen: Soft, nontender, nondistended  Musculoskeletal: No clubbing, cyanosis, no bilateral lower extremity edema. Brisk capillary refill.    Skin:  No rashes  on visible skin Neurologic: awake, alert and following commands     ASSESSMENT:  COVID-19 pneumonia  Acute hypoxic respiratory failure  Multiple pulmonary emboli on right  Leukocytosis     PLAN:  Pharmacy dosing remdesivir  Decadron 6 mg daily  Zinc, vitamin C, vitamin D  Lovenox 1 mg/kg twice daily  Bilateral lower extremity ultrasound pending  Monitor serial inflammatory markers  Supplemental oxygen wean as tolerated        Medications:  REVIEWED DAILY    Infusion Medications    dextrose       Scheduled Medications    hydrocortisone   Topical TID    sodium chloride flush  10 mL Intravenous 2 times per day    dexamethasone  6 mg Oral Daily    zinc sulfate  50 mg Oral Daily    vitamin D  1,000 Units Oral Daily    ascorbic acid  500 mg Oral Daily    insulin glargine  0.25 Units/kg Subcutaneous Nightly    insulin lispro  0-6 Units Subcutaneous TID WC    insulin lispro  0-3 Units Subcutaneous Nightly    enoxaparin  1 mg/kg Subcutaneous Q12H    remdesivir IVPB  100 mg Intravenous Q24H     PRN Meds: sodium chloride flush, promethazine **OR** ondansetron, polyethylene glycol, acetaminophen **OR** acetaminophen, potassium chloride **OR** potassium alternative oral replacement **OR** potassium chloride, magnesium sulfate, glucose, dextrose, glucagon (rDNA), dextrose, sodium chloride    Labs:     Recent Labs     12/21/20  1201   WBC 13.5*   HGB 12.9   HCT 41.1          Recent Labs     12/21/20  1201      K 5.1*      CO2 19*   BUN 24*   CREATININE 1.1   CALCIUM 8.7       Recent Labs     12/21/20  1201   PROT 6.8   ALKPHOS 80   ALT 18   AST 33   BILITOT 0.5       No results for input(s): INR in the last 72 hours.     Recent Labs     12/21/20  1201   TROPONINI <0.01       Chronic labs:    Lab Results   Component Value Date    LABA1C 7.6 (H) 01/06/2020       Radiology: REVIEWED DAILY    +++++++++++++++++++++++++++++++++++++++++++++++++  [facility]  Physician - Hospitalist

## 2020-12-23 LAB
ALBUMIN SERPL-MCNC: 3.2 G/DL (ref 3.5–5.2)
ALP BLD-CCNC: 69 U/L (ref 40–129)
ALT SERPL-CCNC: 12 U/L (ref 0–40)
ANION GAP SERPL CALCULATED.3IONS-SCNC: 14 MMOL/L (ref 7–16)
AST SERPL-CCNC: 18 U/L (ref 0–39)
BASOPHILS ABSOLUTE: 0.02 E9/L (ref 0–0.2)
BASOPHILS RELATIVE PERCENT: 0.1 % (ref 0–2)
BILIRUB SERPL-MCNC: 0.3 MG/DL (ref 0–1.2)
BUN BLDV-MCNC: 34 MG/DL (ref 8–23)
C-REACTIVE PROTEIN: 3.7 MG/DL (ref 0–0.4)
CALCIUM SERPL-MCNC: 8.8 MG/DL (ref 8.6–10.2)
CHLORIDE BLD-SCNC: 103 MMOL/L (ref 98–107)
CO2: 21 MMOL/L (ref 22–29)
CREAT SERPL-MCNC: 1.1 MG/DL (ref 0.7–1.2)
D DIMER: 1732 NG/ML DDU
EKG ATRIAL RATE: 77 BPM
EKG P AXIS: 7 DEGREES
EKG P-R INTERVAL: 146 MS
EKG Q-T INTERVAL: 380 MS
EKG QRS DURATION: 88 MS
EKG QTC CALCULATION (BAZETT): 430 MS
EKG R AXIS: -35 DEGREES
EKG T AXIS: 49 DEGREES
EKG VENTRICULAR RATE: 77 BPM
EOSINOPHILS ABSOLUTE: 0 E9/L (ref 0.05–0.5)
EOSINOPHILS RELATIVE PERCENT: 0 % (ref 0–6)
FIBRINOGEN: 681 MG/DL (ref 225–540)
GFR AFRICAN AMERICAN: >60
GFR NON-AFRICAN AMERICAN: >60 ML/MIN/1.73
GLUCOSE BLD-MCNC: 297 MG/DL (ref 74–99)
HCT VFR BLD CALC: 39.8 % (ref 37–54)
HEMOGLOBIN: 12 G/DL (ref 12.5–16.5)
IMMATURE GRANULOCYTES #: 0.21 E9/L
IMMATURE GRANULOCYTES %: 1.3 % (ref 0–5)
LYMPHOCYTES ABSOLUTE: 1.14 E9/L (ref 1.5–4)
LYMPHOCYTES RELATIVE PERCENT: 6.9 % (ref 20–42)
MCH RBC QN AUTO: 23.8 PG (ref 26–35)
MCHC RBC AUTO-ENTMCNC: 30.2 % (ref 32–34.5)
MCV RBC AUTO: 78.8 FL (ref 80–99.9)
METER GLUCOSE: 257 MG/DL (ref 74–99)
METER GLUCOSE: 267 MG/DL (ref 74–99)
METER GLUCOSE: 301 MG/DL (ref 74–99)
METER GLUCOSE: 341 MG/DL (ref 74–99)
MONOCYTES ABSOLUTE: 1.1 E9/L (ref 0.1–0.95)
MONOCYTES RELATIVE PERCENT: 6.6 % (ref 2–12)
NEUTROPHILS ABSOLUTE: 14.17 E9/L (ref 1.8–7.3)
NEUTROPHILS RELATIVE PERCENT: 85.1 % (ref 43–80)
PDW BLD-RTO: 15.2 FL (ref 11.5–15)
PLATELET # BLD: 351 E9/L (ref 130–450)
PMV BLD AUTO: 12.2 FL (ref 7–12)
POTASSIUM REFLEX MAGNESIUM: 4.4 MMOL/L (ref 3.5–5)
POTASSIUM SERPL-SCNC: 4.4 MMOL/L (ref 3.5–5)
RBC # BLD: 5.05 E12/L (ref 3.8–5.8)
SODIUM BLD-SCNC: 138 MMOL/L (ref 132–146)
TOTAL PROTEIN: 6.5 G/DL (ref 6.4–8.3)
WBC # BLD: 16.6 E9/L (ref 4.5–11.5)

## 2020-12-23 PROCEDURE — 2700000000 HC OXYGEN THERAPY PER DAY

## 2020-12-23 PROCEDURE — 85378 FIBRIN DEGRADE SEMIQUANT: CPT

## 2020-12-23 PROCEDURE — 36415 COLL VENOUS BLD VENIPUNCTURE: CPT

## 2020-12-23 PROCEDURE — 2580000003 HC RX 258: Performed by: FAMILY MEDICINE

## 2020-12-23 PROCEDURE — 2500000003 HC RX 250 WO HCPCS: Performed by: FAMILY MEDICINE

## 2020-12-23 PROCEDURE — 6370000000 HC RX 637 (ALT 250 FOR IP): Performed by: FAMILY MEDICINE

## 2020-12-23 PROCEDURE — 80053 COMPREHEN METABOLIC PANEL: CPT

## 2020-12-23 PROCEDURE — 82962 GLUCOSE BLOOD TEST: CPT

## 2020-12-23 PROCEDURE — 6360000002 HC RX W HCPCS: Performed by: FAMILY MEDICINE

## 2020-12-23 PROCEDURE — 85025 COMPLETE CBC W/AUTO DIFF WBC: CPT

## 2020-12-23 PROCEDURE — 85384 FIBRINOGEN ACTIVITY: CPT

## 2020-12-23 PROCEDURE — 86140 C-REACTIVE PROTEIN: CPT

## 2020-12-23 PROCEDURE — 2060000000 HC ICU INTERMEDIATE R&B

## 2020-12-23 RX ADMIN — ENOXAPARIN SODIUM 100 MG: 100 INJECTION SUBCUTANEOUS at 04:42

## 2020-12-23 RX ADMIN — INSULIN LISPRO 3 UNITS: 100 INJECTION, SOLUTION INTRAVENOUS; SUBCUTANEOUS at 12:10

## 2020-12-23 RX ADMIN — HYDROCORTISONE: 1 CREAM TOPICAL at 21:29

## 2020-12-23 RX ADMIN — INSULIN LISPRO 4 UNITS: 100 INJECTION, SOLUTION INTRAVENOUS; SUBCUTANEOUS at 06:17

## 2020-12-23 RX ADMIN — DEXAMETHASONE 6 MG: 4 TABLET ORAL at 10:08

## 2020-12-23 RX ADMIN — INSULIN LISPRO 2 UNITS: 100 INJECTION, SOLUTION INTRAVENOUS; SUBCUTANEOUS at 21:37

## 2020-12-23 RX ADMIN — OXYCODONE HYDROCHLORIDE AND ACETAMINOPHEN 500 MG: 500 TABLET ORAL at 10:07

## 2020-12-23 RX ADMIN — Medication 10 ML: at 21:27

## 2020-12-23 RX ADMIN — Medication 50 MG: at 10:07

## 2020-12-23 RX ADMIN — INSULIN LISPRO 3 UNITS: 100 INJECTION, SOLUTION INTRAVENOUS; SUBCUTANEOUS at 17:27

## 2020-12-23 RX ADMIN — Medication 1000 UNITS: at 10:07

## 2020-12-23 RX ADMIN — HYDROCORTISONE: 1 CREAM TOPICAL at 10:08

## 2020-12-23 RX ADMIN — INSULIN GLARGINE 24 UNITS: 100 INJECTION, SOLUTION SUBCUTANEOUS at 21:37

## 2020-12-23 RX ADMIN — REMDESIVIR 100 MG: 100 INJECTION, POWDER, LYOPHILIZED, FOR SOLUTION INTRAVENOUS at 16:50

## 2020-12-23 RX ADMIN — ENOXAPARIN SODIUM 100 MG: 100 INJECTION SUBCUTANEOUS at 16:50

## 2020-12-23 RX ADMIN — Medication 10 ML: at 10:07

## 2020-12-23 ASSESSMENT — PAIN SCALES - GENERAL
PAINLEVEL_OUTOF10: 0

## 2020-12-23 NOTE — CARE COORDINATION
12/23/2020 1542 CM note: COVID (+) 12/18/2020. Day 2 remdesivir. Spoke with patient via room phone to discuss discharge planning. Discharge plan remains home. Patient is refusing HHC at this time. Patient is currently on 1L nc at 94%; no home O2.  Electronically signed by Saritha Colorado RN on 12/23/2020 at 3:45 PM

## 2020-12-24 LAB
ALBUMIN SERPL-MCNC: 3.1 G/DL (ref 3.5–5.2)
ALP BLD-CCNC: 65 U/L (ref 40–129)
ALT SERPL-CCNC: 15 U/L (ref 0–40)
ANION GAP SERPL CALCULATED.3IONS-SCNC: 10 MMOL/L (ref 7–16)
AST SERPL-CCNC: 22 U/L (ref 0–39)
BASOPHILS ABSOLUTE: 0.01 E9/L (ref 0–0.2)
BASOPHILS RELATIVE PERCENT: 0.1 % (ref 0–2)
BILIRUB SERPL-MCNC: 0.3 MG/DL (ref 0–1.2)
BUN BLDV-MCNC: 28 MG/DL (ref 8–23)
C-REACTIVE PROTEIN: 1.6 MG/DL (ref 0–0.4)
CALCIUM SERPL-MCNC: 8.4 MG/DL (ref 8.6–10.2)
CHLORIDE BLD-SCNC: 104 MMOL/L (ref 98–107)
CO2: 22 MMOL/L (ref 22–29)
CREAT SERPL-MCNC: 0.9 MG/DL (ref 0.7–1.2)
D DIMER: 821 NG/ML DDU
EOSINOPHILS ABSOLUTE: 0.01 E9/L (ref 0.05–0.5)
EOSINOPHILS RELATIVE PERCENT: 0.1 % (ref 0–6)
FIBRINOGEN: 580 MG/DL (ref 225–540)
GFR AFRICAN AMERICAN: >60
GFR NON-AFRICAN AMERICAN: >60 ML/MIN/1.73
GLUCOSE BLD-MCNC: 179 MG/DL (ref 74–99)
HCT VFR BLD CALC: 34.2 % (ref 37–54)
HCT VFR BLD CALC: 34.6 % (ref 37–54)
HEMOGLOBIN: 10.9 G/DL (ref 12.5–16.5)
HEMOGLOBIN: 11 G/DL (ref 12.5–16.5)
IMMATURE GRANULOCYTES #: 0.17 E9/L
IMMATURE GRANULOCYTES %: 1.1 % (ref 0–5)
LYMPHOCYTES ABSOLUTE: 1.49 E9/L (ref 1.5–4)
LYMPHOCYTES RELATIVE PERCENT: 9.4 % (ref 20–42)
MCH RBC QN AUTO: 24.1 PG (ref 26–35)
MCHC RBC AUTO-ENTMCNC: 31.8 % (ref 32–34.5)
MCV RBC AUTO: 75.7 FL (ref 80–99.9)
METER GLUCOSE: 185 MG/DL (ref 74–99)
METER GLUCOSE: 187 MG/DL (ref 74–99)
METER GLUCOSE: 190 MG/DL (ref 74–99)
METER GLUCOSE: 267 MG/DL (ref 74–99)
MONOCYTES ABSOLUTE: 1.16 E9/L (ref 0.1–0.95)
MONOCYTES RELATIVE PERCENT: 7.3 % (ref 2–12)
NEUTROPHILS ABSOLUTE: 12.95 E9/L (ref 1.8–7.3)
NEUTROPHILS RELATIVE PERCENT: 82 % (ref 43–80)
PDW BLD-RTO: 14.8 FL (ref 11.5–15)
PLATELET # BLD: 366 E9/L (ref 130–450)
PMV BLD AUTO: 11.2 FL (ref 7–12)
POTASSIUM REFLEX MAGNESIUM: 3.8 MMOL/L (ref 3.5–5)
POTASSIUM SERPL-SCNC: 3.8 MMOL/L (ref 3.5–5)
RBC # BLD: 4.57 E12/L (ref 3.8–5.8)
SODIUM BLD-SCNC: 136 MMOL/L (ref 132–146)
TOTAL PROTEIN: 5.9 G/DL (ref 6.4–8.3)
WBC # BLD: 15.8 E9/L (ref 4.5–11.5)

## 2020-12-24 PROCEDURE — 2580000003 HC RX 258: Performed by: FAMILY MEDICINE

## 2020-12-24 PROCEDURE — 85018 HEMOGLOBIN: CPT

## 2020-12-24 PROCEDURE — 36415 COLL VENOUS BLD VENIPUNCTURE: CPT

## 2020-12-24 PROCEDURE — 80053 COMPREHEN METABOLIC PANEL: CPT

## 2020-12-24 PROCEDURE — 85014 HEMATOCRIT: CPT

## 2020-12-24 PROCEDURE — 6370000000 HC RX 637 (ALT 250 FOR IP): Performed by: FAMILY MEDICINE

## 2020-12-24 PROCEDURE — 85025 COMPLETE CBC W/AUTO DIFF WBC: CPT

## 2020-12-24 PROCEDURE — 86140 C-REACTIVE PROTEIN: CPT

## 2020-12-24 PROCEDURE — 2500000003 HC RX 250 WO HCPCS: Performed by: FAMILY MEDICINE

## 2020-12-24 PROCEDURE — 2060000000 HC ICU INTERMEDIATE R&B

## 2020-12-24 PROCEDURE — 6360000002 HC RX W HCPCS: Performed by: FAMILY MEDICINE

## 2020-12-24 PROCEDURE — 85384 FIBRINOGEN ACTIVITY: CPT

## 2020-12-24 PROCEDURE — 85378 FIBRIN DEGRADE SEMIQUANT: CPT

## 2020-12-24 PROCEDURE — 2700000000 HC OXYGEN THERAPY PER DAY

## 2020-12-24 PROCEDURE — 82962 GLUCOSE BLOOD TEST: CPT

## 2020-12-24 RX ORDER — MORPHINE SULFATE 2 MG/ML
2 INJECTION, SOLUTION INTRAMUSCULAR; INTRAVENOUS
Status: DISCONTINUED | OUTPATIENT
Start: 2020-12-24 | End: 2020-12-28 | Stop reason: HOSPADM

## 2020-12-24 RX ORDER — SODIUM CHLORIDE 9 MG/ML
INJECTION, SOLUTION INTRAVENOUS CONTINUOUS
Status: DISCONTINUED | OUTPATIENT
Start: 2020-12-25 | End: 2020-12-26

## 2020-12-24 RX ADMIN — ENOXAPARIN SODIUM 100 MG: 100 INJECTION SUBCUTANEOUS at 04:08

## 2020-12-24 RX ADMIN — INSULIN LISPRO 2 UNITS: 100 INJECTION, SOLUTION INTRAVENOUS; SUBCUTANEOUS at 21:52

## 2020-12-24 RX ADMIN — ENOXAPARIN SODIUM 100 MG: 100 INJECTION SUBCUTANEOUS at 16:32

## 2020-12-24 RX ADMIN — Medication 10 ML: at 21:00

## 2020-12-24 RX ADMIN — Medication 1000 UNITS: at 09:50

## 2020-12-24 RX ADMIN — HYDROCORTISONE: 1 CREAM TOPICAL at 16:32

## 2020-12-24 RX ADMIN — REMDESIVIR 100 MG: 100 INJECTION, POWDER, LYOPHILIZED, FOR SOLUTION INTRAVENOUS at 18:04

## 2020-12-24 RX ADMIN — INSULIN LISPRO 1 UNITS: 100 INJECTION, SOLUTION INTRAVENOUS; SUBCUTANEOUS at 06:25

## 2020-12-24 RX ADMIN — INSULIN LISPRO 1 UNITS: 100 INJECTION, SOLUTION INTRAVENOUS; SUBCUTANEOUS at 16:42

## 2020-12-24 RX ADMIN — Medication 50 MG: at 10:58

## 2020-12-24 RX ADMIN — DEXAMETHASONE 6 MG: 4 TABLET ORAL at 09:50

## 2020-12-24 RX ADMIN — HYDROCORTISONE: 1 CREAM TOPICAL at 10:59

## 2020-12-24 RX ADMIN — Medication 10 ML: at 10:59

## 2020-12-24 RX ADMIN — INSULIN LISPRO 1 UNITS: 100 INJECTION, SOLUTION INTRAVENOUS; SUBCUTANEOUS at 11:37

## 2020-12-24 RX ADMIN — OXYCODONE HYDROCHLORIDE AND ACETAMINOPHEN 500 MG: 500 TABLET ORAL at 09:50

## 2020-12-24 RX ADMIN — INSULIN GLARGINE 24 UNITS: 100 INJECTION, SOLUTION SUBCUTANEOUS at 21:52

## 2020-12-24 ASSESSMENT — PAIN SCALES - GENERAL
PAINLEVEL_OUTOF10: 2
PAINLEVEL_OUTOF10: 4
PAINLEVEL_OUTOF10: 0

## 2020-12-24 ASSESSMENT — PAIN DESCRIPTION - DESCRIPTORS: DESCRIPTORS: ACHING;DISCOMFORT

## 2020-12-24 ASSESSMENT — PAIN DESCRIPTION - PAIN TYPE
TYPE: ACUTE PAIN
TYPE: ACUTE PAIN

## 2020-12-24 ASSESSMENT — PAIN DESCRIPTION - LOCATION
LOCATION: BUTTOCKS
LOCATION: BUTTOCKS

## 2020-12-24 ASSESSMENT — PAIN DESCRIPTION - ONSET
ONSET: ON-GOING
ONSET: ON-GOING

## 2020-12-24 ASSESSMENT — PAIN DESCRIPTION - PROGRESSION
CLINICAL_PROGRESSION: NOT CHANGED
CLINICAL_PROGRESSION: GRADUALLY IMPROVING

## 2020-12-24 ASSESSMENT — PAIN DESCRIPTION - FREQUENCY
FREQUENCY: CONTINUOUS
FREQUENCY: CONTINUOUS

## 2020-12-24 NOTE — PLAN OF CARE
Problem: Falls - Risk of:  Goal: Will remain free from falls  Description: Will remain free from falls  Outcome: Met This Shift  Goal: Absence of physical injury  Description: Absence of physical injury  Outcome: Met This Shift     Problem: Airway Clearance - Ineffective  Goal: Achieve or maintain patent airway  Outcome: Met This Shift     Problem: Gas Exchange - Impaired  Goal: Absence of hypoxia  Outcome: Met This Shift     Problem: Fatigue  Goal: Verbalize increase energy and improved vitality  Outcome: Met This Shift     Problem: Pain:  Goal: Pain level will decrease  Description: Pain level will decrease  Outcome: Met This Shift  Goal: Control of acute pain  Description: Control of acute pain  Outcome: Met This Shift  Goal: Control of chronic pain  Description: Control of chronic pain  Outcome: Met This Shift

## 2020-12-24 NOTE — PROGRESS NOTES
Hospitalist Progress Note      SYNOPSIS: Patient admitted on 2020 for shortness of breath and hypoxic respiratory failure due to COVID-19, patient started on remdesivir, last dose is . Patient also was found to have pulmonary embolism he is on Lovenox 100 mg twice daily for treatment of PE    SUBJECTIVE: Patient is Complaining of bleeding from his rectum. Patient seen and examined  Records reviewed. Stable overnight. No other overnight issues reported. Temp (24hrs), Av.5 °F (36.4 °C), Min:97 °F (36.1 °C), Max:98.6 °F (37 °C)    DIET: DIET GENERAL;  CODE: DNR-CCA    Intake/Output Summary (Last 24 hours) at 2020 180  Last data filed at 2020 1140  Gross per 24 hour   Intake 360 ml   Output 0 ml   Net 360 ml       OBJECTIVE:    BP (!) 115/59   Pulse 71   Temp 97 °F (36.1 °C) (Temporal)   Resp 21   Ht 6' 1\" (1.854 m)   Wt 215 lb (97.5 kg)   SpO2 95%   BMI 28.37 kg/m²     General appearance: No apparent distress, appears stated age and cooperative. HEENT:  Conjunctivae/corneas clear. Neck: Supple. No jugular venous distention. Respiratory: Clear to auscultation bilaterally, normal respiratory effort  Cardiovascular: Regular rate rhythm, normal S1-S2  Abdomen: Soft, nontender, nondistended  Rectal exam showed marked prolapsed rectum and significant bloody staining of patient underweight. Musculoskeletal: No clubbing, cyanosis, no bilateral lower extremity edema. Brisk capillary refill. Skin:  No rashes  on visible skin  Neurologic: awake, alert and following commands     ASSESSMENT:  Pneumonia due to COVID-19  Acute hypoxic respiratory failure in the setting of pneumonia and COVID-19 and pulmonary emboli  Multiple pulmonary emboli right lung in the setting of COVID-19  Rectal prolapse Bleeding hemorrhoids, will require surgical evaluation  Leukocytosis, patient on steroids.        PLAN:  Continue remdesivir last dose   Continue Decadron Continue anticoagulation for PE  Patient currently saturating adequately On room air  Lidocaine cream and surgical evaluation to address further. Patient requiring continued anticoagulation. ontinue to monitor blood sugar. DISPOSITION: Home after improvement. Medications:  REVIEWED DAILY    Infusion Medications    dextrose       Scheduled Medications    hydrocortisone   Topical TID    sodium chloride flush  10 mL Intravenous 2 times per day    dexamethasone  6 mg Oral Daily    zinc sulfate  50 mg Oral Daily    vitamin D  1,000 Units Oral Daily    ascorbic acid  500 mg Oral Daily    insulin glargine  0.25 Units/kg Subcutaneous Nightly    insulin lispro  0-6 Units Subcutaneous TID WC    insulin lispro  0-3 Units Subcutaneous Nightly    enoxaparin  1 mg/kg Subcutaneous Q12H    remdesivir IVPB  100 mg Intravenous Q24H     PRN Meds: sodium chloride flush, promethazine **OR** ondansetron, polyethylene glycol, acetaminophen **OR** acetaminophen, potassium chloride **OR** potassium alternative oral replacement **OR** potassium chloride, magnesium sulfate, glucose, dextrose, glucagon (rDNA), dextrose, sodium chloride    Labs:     Recent Labs     12/23/20  0446 12/24/20  0417   WBC 16.6* 15.8*   HGB 12.0* 11.0*   HCT 39.8 34.6*    366       Recent Labs     12/23/20  0446 12/24/20  0417    136   K 4.4  4.4 3.8  3.8    104   CO2 21* 22   BUN 34* 28*   CREATININE 1.1 0.9   CALCIUM 8.8 8.4*       Recent Labs     12/23/20  0446 12/24/20  0417   PROT 6.5 5.9*   ALKPHOS 69 65   ALT 12 15   AST 18 22   BILITOT 0.3 0.3       No results for input(s): INR in the last 72 hours. No results for input(s): [place] in the last 72 hours.     Chronic labs:    Lab Results   Component Value Date    LABA1C 7.6 (H) 01/06/2020       Radiology: REVIEWED DAILY    +++++++++++++++++++++++++++++++++++++++++++++++++  [facility] Physician - Hospitalist [address], [place]  +++++++++++++++++++++++++++++++++++++++++++++++++  NOTE: This report was transcribed using voice recognition software. Every effort was made to ensure accuracy; however, inadvertent computerized transcription errors may be present.

## 2020-12-25 LAB
ALBUMIN SERPL-MCNC: 2.9 G/DL (ref 3.5–5.2)
ALP BLD-CCNC: 65 U/L (ref 40–129)
ALT SERPL-CCNC: 17 U/L (ref 0–40)
ANION GAP SERPL CALCULATED.3IONS-SCNC: 11 MMOL/L (ref 7–16)
AST SERPL-CCNC: 23 U/L (ref 0–39)
BASOPHILS ABSOLUTE: 0.01 E9/L (ref 0–0.2)
BASOPHILS RELATIVE PERCENT: 0.1 % (ref 0–2)
BILIRUB SERPL-MCNC: 0.3 MG/DL (ref 0–1.2)
BUN BLDV-MCNC: 23 MG/DL (ref 8–23)
C-REACTIVE PROTEIN: 1.7 MG/DL (ref 0–0.4)
CALCIUM SERPL-MCNC: 8.3 MG/DL (ref 8.6–10.2)
CHLORIDE BLD-SCNC: 106 MMOL/L (ref 98–107)
CO2: 21 MMOL/L (ref 22–29)
CREAT SERPL-MCNC: 0.9 MG/DL (ref 0.7–1.2)
D DIMER: 687 NG/ML DDU
EOSINOPHILS ABSOLUTE: 0.04 E9/L (ref 0.05–0.5)
EOSINOPHILS RELATIVE PERCENT: 0.3 % (ref 0–6)
FIBRINOGEN: 569 MG/DL (ref 225–540)
GFR AFRICAN AMERICAN: >60
GFR NON-AFRICAN AMERICAN: >60 ML/MIN/1.73
GLUCOSE BLD-MCNC: 182 MG/DL (ref 74–99)
HCT VFR BLD CALC: 33.3 % (ref 37–54)
HCT VFR BLD CALC: 34 % (ref 37–54)
HCT VFR BLD CALC: 34 % (ref 37–54)
HEMOGLOBIN: 10.5 G/DL (ref 12.5–16.5)
HEMOGLOBIN: 10.8 G/DL (ref 12.5–16.5)
HEMOGLOBIN: 10.9 G/DL (ref 12.5–16.5)
IMMATURE GRANULOCYTES #: 0.14 E9/L
IMMATURE GRANULOCYTES %: 1.1 % (ref 0–5)
LYMPHOCYTES ABSOLUTE: 1.71 E9/L (ref 1.5–4)
LYMPHOCYTES RELATIVE PERCENT: 13.5 % (ref 20–42)
MCH RBC QN AUTO: 23.7 PG (ref 26–35)
MCHC RBC AUTO-ENTMCNC: 31.8 % (ref 32–34.5)
MCV RBC AUTO: 74.7 FL (ref 80–99.9)
METER GLUCOSE: 124 MG/DL (ref 74–99)
METER GLUCOSE: 155 MG/DL (ref 74–99)
METER GLUCOSE: 344 MG/DL (ref 74–99)
MONOCYTES ABSOLUTE: 1.08 E9/L (ref 0.1–0.95)
MONOCYTES RELATIVE PERCENT: 8.5 % (ref 2–12)
NEUTROPHILS ABSOLUTE: 9.67 E9/L (ref 1.8–7.3)
NEUTROPHILS RELATIVE PERCENT: 76.5 % (ref 43–80)
PDW BLD-RTO: 14.8 FL (ref 11.5–15)
PLATELET # BLD: 378 E9/L (ref 130–450)
PMV BLD AUTO: 11.7 FL (ref 7–12)
POTASSIUM REFLEX MAGNESIUM: 3.9 MMOL/L (ref 3.5–5)
POTASSIUM SERPL-SCNC: 3.9 MMOL/L (ref 3.5–5)
RBC # BLD: 4.55 E12/L (ref 3.8–5.8)
SODIUM BLD-SCNC: 138 MMOL/L (ref 132–146)
TOTAL PROTEIN: 5.8 G/DL (ref 6.4–8.3)
WBC # BLD: 12.7 E9/L (ref 4.5–11.5)

## 2020-12-25 PROCEDURE — 80053 COMPREHEN METABOLIC PANEL: CPT

## 2020-12-25 PROCEDURE — 36415 COLL VENOUS BLD VENIPUNCTURE: CPT

## 2020-12-25 PROCEDURE — 85018 HEMOGLOBIN: CPT

## 2020-12-25 PROCEDURE — 2500000003 HC RX 250 WO HCPCS: Performed by: FAMILY MEDICINE

## 2020-12-25 PROCEDURE — 6360000002 HC RX W HCPCS: Performed by: FAMILY MEDICINE

## 2020-12-25 PROCEDURE — 82962 GLUCOSE BLOOD TEST: CPT

## 2020-12-25 PROCEDURE — 6370000000 HC RX 637 (ALT 250 FOR IP): Performed by: STUDENT IN AN ORGANIZED HEALTH CARE EDUCATION/TRAINING PROGRAM

## 2020-12-25 PROCEDURE — 85025 COMPLETE CBC W/AUTO DIFF WBC: CPT

## 2020-12-25 PROCEDURE — 86140 C-REACTIVE PROTEIN: CPT

## 2020-12-25 PROCEDURE — 6370000000 HC RX 637 (ALT 250 FOR IP): Performed by: FAMILY MEDICINE

## 2020-12-25 PROCEDURE — 85384 FIBRINOGEN ACTIVITY: CPT

## 2020-12-25 PROCEDURE — 85378 FIBRIN DEGRADE SEMIQUANT: CPT

## 2020-12-25 PROCEDURE — 2580000003 HC RX 258: Performed by: FAMILY MEDICINE

## 2020-12-25 PROCEDURE — 85014 HEMATOCRIT: CPT

## 2020-12-25 PROCEDURE — 2580000003 HC RX 258: Performed by: STUDENT IN AN ORGANIZED HEALTH CARE EDUCATION/TRAINING PROGRAM

## 2020-12-25 PROCEDURE — 2060000000 HC ICU INTERMEDIATE R&B

## 2020-12-25 RX ORDER — HYDROCORTISONE 25 MG/G
CREAM TOPICAL 2 TIMES DAILY
Status: DISCONTINUED | OUTPATIENT
Start: 2020-12-25 | End: 2020-12-28 | Stop reason: HOSPADM

## 2020-12-25 RX ORDER — LIDOCAINE 40 MG/G
CREAM TOPICAL PRN
Status: DISCONTINUED | OUTPATIENT
Start: 2020-12-25 | End: 2020-12-28 | Stop reason: HOSPADM

## 2020-12-25 RX ADMIN — HYDROCORTISONE: 25 CREAM TOPICAL at 22:31

## 2020-12-25 RX ADMIN — OXYCODONE HYDROCHLORIDE AND ACETAMINOPHEN 500 MG: 500 TABLET ORAL at 10:50

## 2020-12-25 RX ADMIN — REMDESIVIR 100 MG: 100 INJECTION, POWDER, LYOPHILIZED, FOR SOLUTION INTRAVENOUS at 17:51

## 2020-12-25 RX ADMIN — INSULIN LISPRO 4 UNITS: 100 INJECTION, SOLUTION INTRAVENOUS; SUBCUTANEOUS at 16:48

## 2020-12-25 RX ADMIN — HYDROCORTISONE: 25 CREAM TOPICAL at 10:50

## 2020-12-25 RX ADMIN — Medication 10 ML: at 10:58

## 2020-12-25 RX ADMIN — SODIUM CHLORIDE: 9 INJECTION, SOLUTION INTRAVENOUS at 00:44

## 2020-12-25 RX ADMIN — ACETAMINOPHEN 650 MG: 325 TABLET ORAL at 16:19

## 2020-12-25 RX ADMIN — Medication 50 MG: at 10:40

## 2020-12-25 RX ADMIN — Medication 1000 UNITS: at 10:40

## 2020-12-25 RX ADMIN — DEXAMETHASONE 6 MG: 4 TABLET ORAL at 10:40

## 2020-12-25 RX ADMIN — LIDOCAINE 4%: 4 CREAM TOPICAL at 10:50

## 2020-12-25 ASSESSMENT — PAIN SCALES - GENERAL
PAINLEVEL_OUTOF10: 0
PAINLEVEL_OUTOF10: 3
PAINLEVEL_OUTOF10: 3
PAINLEVEL_OUTOF10: 0
PAINLEVEL_OUTOF10: 0

## 2020-12-25 ASSESSMENT — PAIN DESCRIPTION - FREQUENCY: FREQUENCY: CONTINUOUS

## 2020-12-25 ASSESSMENT — PAIN DESCRIPTION - PAIN TYPE
TYPE: ACUTE PAIN
TYPE: ACUTE PAIN

## 2020-12-25 ASSESSMENT — PAIN DESCRIPTION - LOCATION: LOCATION: BUTTOCKS

## 2020-12-25 ASSESSMENT — PAIN DESCRIPTION - PROGRESSION
CLINICAL_PROGRESSION: GRADUALLY IMPROVING

## 2020-12-25 ASSESSMENT — PAIN DESCRIPTION - ONSET: ONSET: ON-GOING

## 2020-12-25 ASSESSMENT — PAIN DESCRIPTION - DESCRIPTORS: DESCRIPTORS: ACHING;DISCOMFORT

## 2020-12-25 NOTE — PLAN OF CARE
Problem: Falls - Risk of:  Goal: Will remain free from falls  Description: Will remain free from falls  Outcome: Met This Shift  Goal: Absence of physical injury  Description: Absence of physical injury  Outcome: Met This Shift     Problem: Gas Exchange - Impaired  Goal: Absence of hypoxia  Outcome: Met This Shift  Goal: Promote optimal lung function  Outcome: Met This Shift     Problem: Pain:  Goal: Pain level will decrease  Description: Pain level will decrease  Outcome: Met This Shift  Goal: Control of acute pain  Description: Control of acute pain  Outcome: Met This Shift  Goal: Control of chronic pain  Description: Control of chronic pain  Outcome: Met This Shift

## 2020-12-25 NOTE — PROGRESS NOTES
Patient seen and examined, full consult to follow  Patient with known prolapsing hemorrhoids which he generally reduces on his own. Since admission he had an episode of diarrhea and he has been unable to push them back in for over 2 days. Yesterday he had increasing pain and began to have some bleeding. On exam he has circumferential prolapse of hemorrhoidal tissue for 1-2cm with superficial ischemic discoloration. ELLIOTT demonstrates tenderness, patent anal canal with edema, normal appearing mucosa. The hemorrhoids are unable to be reduced. Discussed with Dr. Miah Miguel- will attempt to reduce edema with sugar, sitz baths.    Pt [email or web address], hold evening dose of lovenox for possible surgical intervention     Electronically signed by Natacha Hein,  on 12/24/2020 at 8:45 PM

## 2020-12-25 NOTE — CONSULTS
COLORECTAL SURGERY  CONSULT NOTE  12/25/2020     Physician Consulted: Dr. Chasidy Ott  Reason for Consult: rectal prolapse with rectal bleeding  Referring Physician: Dr. Eddie Oviedo     JORDI Leigh is a 69 y.o. male with PMH of DM presented for evaluation of shortness of breath on 12/21. He was found to have Covid as well as a PE and was started on therapeutic Lovenox. He is a  from [place] so there are no records for him in our system. However he is a great historian. He states that his hemorrhoids have been bothering him since he was admitted here on the 21st of this month. He had an episode of diarrhea and he felt them prolapse and has since been unable to reduce them on his own. He has also been having increased rectal bleeding. His hemoglobin was 12.9 on admission and is now 10.9. He denies any history of GERD or PUD but has never had an EGD. He has history of severe hemorrhoids that prolapse occasionally. He states that he is usually able to reduce them on his own and they rarely bleed. He gets his regular colonoscopies as recommended by his physician in [place]. His last colonoscopy was about 5 years ago and he states they found and removed one polyp. Of note, his youngest daughter passed away of colon cancer at age 46 after refusing surgery. His only medications are Metformin and aspirin 325 mg. He had a previous DVT about 3 years ago that was treated with Xarelto for about a year. His only surgeries have been an appendectomy as a child and a right-sided pleurodesis required for a traumatic pneumothorax as a teenager. Past Medical History   No past medical history on file. Past Surgical History   No past surgical history on file. Medications Prior to Admission:    Home Medications   Prior to Admission medications    Not on File            No Known Allergies     Family History   No family history on file.         Social History           Tobacco Use EXAMINATION: CTA OF THE CHEST 12/21/2020 2:44 pm TECHNIQUE: CTA of the chest was performed after the administration of intravenous contrast.  Multiplanar reformatted images are provided for review. MIP images are provided for review. Dose modulation, iterative reconstruction, and/or weight based adjustment of the mA/kV was utilized to reduce the radiation dose to as low as reasonably achievable. Contrast is 100 mL Isovue 370 IV. Dose is total .74 M Gy cm. COMPARISON: Portable chest 12/21/2020. HISTORY: ORDERING SYSTEM PROVIDED HISTORY: pe TECHNOLOGIST PROVIDED HISTORY: Reason for exam:->pe What reading provider will be dictating this exam?->CRC FINDINGS: Pulmonary Arteries: Positive for pulmonary emboli. There is a moderate size clot within the proximal right middle lobe pulmonary artery branching into the lateral segment and with some additional tiny peripheral emboli in the medial segment right middle lobe. At least 1 or 2 tiny peripheral emboli are suggested in the right lower lobe. There is a tiny non occluding clot in the left lower lobe main pulmonary artery. Suspect at least 1 or 2 tiny peripheral left lower lobe emboli. The central pulmonary arteries are normal size. Mild dilatation of the left and right ventricles are present. There may be mild RV strain. Mediastinum: Moderate retrocardiac hiatal hernia. The hernia contains the proximal 2/3 of the stomach. The gastric fundus wraps posteriorly, looping back upon itself, compressing the distal esophagus. Mild amount of herniated fat as well. A few small lymph nodes in the herniated fat. Small bilateral hilar and mediastinal lymph nodes. Small paratracheal lymph nodes up to 1.3 cm. Slightly plump 3 cm by 2 cm subcarinal lymph node. No aortic aneurysm or dissection. 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  Normal size of the thyroid with no obvious nodules. Of the esophagus is not dilated or thickened. Distal esophagus mildly compressed by the hiatal hernia. Lungs/pleura: Large areas of ground-glass infiltrates throughout the upper lobes with a subpleural predominance. Diffuse infiltrates in the mid and lower lung fields as well. Several bands of atelectasis in right and left lower lobes. Patchy consolidation in the inferior lower lobes right more than left. Mild atelectasis along the fissures and at the posterior lung bases. No pleural effusion or pneumothorax. Upper Abdomen: No obvious liver lesions. Spleen has normal size. Adrenal glands are not enlarged. Mild lobulations in the cortical margins of bilateral upper kidney margins, there may be minimal scarring. No ascites. Soft Tissues/Bones: Moderate spurring and vacuum disc degeneration C6/C7. A mild stenosis. Mild upper thoracic kyphosis. No thoracic compression fracture. 1. Positive for PE. 2. Scattered predominantly ground-glass type bilateral lung infiltrates. 3. Bibasilar atelectasis. 4. Moderate hiatal hernia. 5. Report called to Dr. Maria Esther Otero at [facility] emergency room 1520 hours 12/21/2020. NOTE:  This is an abnormal report. ASSESSMENT:  69 y.o. male with history of extensive hemorrhoids that occasionally prolapse but are usually easily reducible. Now presents with 2 days of prolapsed hemorrhoids, ischemic with some areas of necrosis that are extremely painful and are not manually reducible.      PLAN:  -Continue sugar sitz baths to reduce edema and attempt manual reduction  -steroid cream, lidocaine jelly  -no plans for surgery given induration and subsequent post op stenosis if surgery performed, seen with Dr. Richard Segura  -expect 4-6 weeks until resolution of hemorrhoids       Discussed with Dr. Seymour Ledesma     Electronically signed by Fatou Jones MD on 12/25/20 at 12:36 AM EST

## 2020-12-25 NOTE — PROGRESS NOTES
Hospitalist Progress Note      PCP: No primary care provider on file. Date of Admission: 12/21/2020    Hospital Course:\" Patient admitted on 12/21/2020 for shortness of breath and hypoxic respiratory failure due to COVID-19, patient started on remdesivir, last dose is December 25. Patient also was found to have pulmonary embolism he is on Lovenox 100 mg twice daily for treatment of PE\"    Subjective:   He is c/o pain from hemorrhoids. He didn't sleep. He has good appetite. Medications:  Reviewed    Infusion Medications    sodium chloride 100 mL/hr at 12/25/20 0044    dextrose       Scheduled Medications    hydrocortisone   Topical TID    sodium chloride flush  10 mL Intravenous 2 times per day    dexamethasone  6 mg Oral Daily    zinc sulfate  50 mg Oral Daily    vitamin D  1,000 Units Oral Daily    ascorbic acid  500 mg Oral Daily    insulin glargine  0.25 Units/kg Subcutaneous Nightly    insulin lispro  0-6 Units Subcutaneous TID WC    insulin lispro  0-3 Units Subcutaneous Nightly    [Held by provider] enoxaparin  1 mg/kg Subcutaneous Q12H    remdesivir IVPB  100 mg Intravenous Q24H     PRN Meds: morphine, sodium chloride flush, promethazine **OR** ondansetron, polyethylene glycol, acetaminophen **OR** acetaminophen, potassium chloride **OR** potassium alternative oral replacement **OR** potassium chloride, magnesium sulfate, glucose, dextrose, glucagon (rDNA), dextrose, sodium chloride      Intake/Output Summary (Last 24 hours) at 12/25/2020 0634  Last data filed at 12/24/2020 1140  Gross per 24 hour   Intake 240 ml   Output    Net 240 ml       Physical Exam Performed:    /60   Pulse 73   Temp 99.5 °F (37.5 °C) (Temporal)   Resp 20   Ht 6' 1\" (1.854 m)   Wt 215 lb (97.5 kg)   SpO2 94%   BMI 28.37 kg/m²     General appearance: No apparent distress, appears stated age and cooperative. HEENT: Pupils equal, round, and reactive to light. Conjunctivae/corneas clear. Neck: Supple, with full range of motion. No jugular venous distention. Trachea midline. Respiratory:  Normal respiratory effort. Clear to auscultation, bilaterally without Rales/Wheezes/Rhonchi. Cardiovascular: Regular rate and rhythm with normal S1/S2 without murmurs, rubs or gallops. Abdomen: Soft, non-tender, non-distended with normal bowel sounds. Musculoskeletal: No clubbing, cyanosis or edema bilaterally. Full range of motion without deformity. Skin: Skin color, texture, turgor normal.  No rashes or lesions. Neurologic:  Neurovascularly intact without any focal sensory/motor deficits. Cranial nerves: II-XII intact, grossly non-focal.  Psychiatric: Alert and oriented, thought content appropriate, normal insight      Labs:   Recent Labs     12/23/20 0446 12/24/20 0417 12/24/20  2130 12/25/20  0455   WBC 16.6* 15.8*  --  12.7*   HGB 12.0* 11.0* 10.9* 10.9*  10.8*   HCT 39.8 34.6* 34.2* 34.0*  34.0*    366  --  378     Recent Labs     12/23/20 0446 12/24/20 0417 12/25/20  0455    136 138   K 4.4  4.4 3.8  3.8 3.9    104 106   CO2 21* 22 21*   BUN 34* 28* 23   CREATININE 1.1 0.9 0.9   CALCIUM 8.8 8.4* 8.3*     Recent Labs     12/23/20 0446 12/24/20 0417 12/25/20  0455   AST 18 22 23   ALT 12 15 17   BILITOT 0.3 0.3 0.3   ALKPHOS 69 65 65     No results for input(s): INR in the last 72 hours. No results for input(s): Rufino Oiler in the last 72 hours. Urinalysis:    No results found for: Easter Fermo, BACTERIA, RBCUA, BLOODU, [address], [address]    Radiology:  CTA PULMONARY W CONTRAST   Final Result   1. Positive for PE.   2. Scattered predominantly ground-glass type bilateral lung infiltrates. 3. Bibasilar atelectasis. 4. Moderate hiatal hernia. 5. Report called to Dr. Barbie Barber at [facility] emergency room 1520   hours 12/21/2020. NOTE:  This is an abnormal report.       XR CHEST PORTABLE   Final Result 1. Patchy multifocal bilateral pulmonary infiltrates unchanged compared with   the prior study. Assessment/Plan:    Active Hospital Problems    Diagnosis    Pneumonia due to COVID-19 virus [U07.1, J12.89]     Pneumonia due to COVID-19  -Continue dexamethasone(day 5/10)  -Continue remdesivir(day 5)  -Trend inflammatory markers    Acute hypoxic respiratory failure -due to COVID-19 and pulmonary emboli  -Weaned off oxygen    Multiple pulmonary emboli right lung in the setting of COVID-19  -Lovenox held for hemorrhoid surgery. D/C on eliquis. Prolapsed hemorrhoids  -Plan for surgery today. Appreciate Gen Surgery recommendations  -Pain control      DM II  -Continue lantus and insulin coverage. Chronic anemia-Likely blood loss from hemorrhoids  -H&H stable    DVT Prophylaxis:Lovenox on hold for surgery.   Diet: Diet NPO, After Midnight Exceptions are: Sips with Meds  Code Status: DNR-CCA    PT/OT Eval Status:Ordered    Dispo - Home post surgery    Mihaela Farmer MD

## 2020-12-25 NOTE — CONSULTS
1. Patchy multifocal bilateral pulmonary infiltrates unchanged compared with the prior study.     Cta Pulmonary W Contrast    Result Date: 12/21/2020 EXAMINATION: CTA OF THE CHEST 12/21/2020 2:44 pm TECHNIQUE: CTA of the chest was performed after the administration of intravenous contrast.  Multiplanar reformatted images are provided for review. MIP images are provided for review. Dose modulation, iterative reconstruction, and/or weight based adjustment of the mA/kV was utilized to reduce the radiation dose to as low as reasonably achievable. Contrast is 100 mL Isovue 370 IV. Dose is total .74 M Gy cm. COMPARISON: Portable chest 12/21/2020. HISTORY: ORDERING SYSTEM PROVIDED HISTORY: pe TECHNOLOGIST PROVIDED HISTORY: Reason for exam:->pe What reading provider will be dictating this exam?->CRC FINDINGS: Pulmonary Arteries: Positive for pulmonary emboli. There is a moderate size clot within the proximal right middle lobe pulmonary artery branching into the lateral segment and with some additional tiny peripheral emboli in the medial segment right middle lobe. At least 1 or 2 tiny peripheral emboli are suggested in the right lower lobe. There is a tiny non occluding clot in the left lower lobe main pulmonary artery. Suspect at least 1 or 2 tiny peripheral left lower lobe emboli. The central pulmonary arteries are normal size. Mild dilatation of the left and right ventricles are present. There may be mild RV strain. Mediastinum: Moderate retrocardiac hiatal hernia. The hernia contains the proximal 2/3 of the stomach. The gastric fundus wraps posteriorly, looping back upon itself, compressing the distal esophagus. Mild amount of herniated fat as well. A few small lymph nodes in the herniated fat. Small bilateral hilar and mediastinal lymph nodes. Small paratracheal lymph nodes up to 1.3 cm. Slightly plump 3 cm by 2 cm subcarinal lymph node. No aortic aneurysm or dissection. 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  Normal size of the thyroid with no obvious nodules. Of the esophagus is not dilated or thickened. Distal esophagus mildly compressed by the hiatal hernia. Lungs/pleura: Large areas of ground-glass infiltrates throughout the upper lobes with a subpleural predominance. Diffuse infiltrates in the mid and lower lung fields as well. Several bands of atelectasis in right and left lower lobes. Patchy consolidation in the inferior lower lobes right more than left. Mild atelectasis along the fissures and at the posterior lung bases. No pleural effusion or pneumothorax. Upper Abdomen: No obvious liver lesions. Spleen has normal size. Adrenal glands are not enlarged. Mild lobulations in the cortical margins of bilateral upper kidney margins, there may be minimal scarring. No ascites. Soft Tissues/Bones: Moderate spurring and vacuum disc degeneration C6/C7. A mild stenosis. Mild upper thoracic kyphosis. No thoracic compression fracture. 1. Positive for PE. 2. Scattered predominantly ground-glass type bilateral lung infiltrates. 3. Bibasilar atelectasis. 4. Moderate hiatal hernia. 5. Report called to Dr. Clarise Schaumann at [facility] [place] emergency room 1520 hours 12/21/2020. NOTE:  This is an abnormal report. ASSESSMENT:  69 y.o. male with history of extensive hemorrhoids that occasionally prolapse but are usually easily reducible. Now presents with 2 days of prolapsed hemorrhoids, ischemic with some areas of necrosis that are extremely painful and are not manually reducible.     PLAN:  -Attempting sugar sitz baths to reduce edema and attempt manual reduction  -steroid cream, lidocaine jelly  -no plans for surgery given induration and subsequent post op stenosis if surgery performed, discussed with Dr. Velia Jones    Discussed with Dr. Chepe Orr    Electronically signed by Charlene Fernandes MD on 12/25/20 at 12:36 AM EST

## 2020-12-26 LAB
ALBUMIN SERPL-MCNC: 2.6 G/DL (ref 3.5–5.2)
ALP BLD-CCNC: 61 U/L (ref 40–129)
ALT SERPL-CCNC: 20 U/L (ref 0–40)
ANION GAP SERPL CALCULATED.3IONS-SCNC: 12 MMOL/L (ref 7–16)
AST SERPL-CCNC: 23 U/L (ref 0–39)
BASOPHILS ABSOLUTE: 0.01 E9/L (ref 0–0.2)
BASOPHILS RELATIVE PERCENT: 0.1 % (ref 0–2)
BILIRUB SERPL-MCNC: 0.3 MG/DL (ref 0–1.2)
BUN BLDV-MCNC: 22 MG/DL (ref 8–23)
C-REACTIVE PROTEIN: 1.1 MG/DL (ref 0–0.4)
CALCIUM SERPL-MCNC: 8.1 MG/DL (ref 8.6–10.2)
CHLORIDE BLD-SCNC: 110 MMOL/L (ref 98–107)
CO2: 18 MMOL/L (ref 22–29)
CREAT SERPL-MCNC: 0.9 MG/DL (ref 0.7–1.2)
D DIMER: 605 NG/ML DDU
EOSINOPHILS ABSOLUTE: 0.03 E9/L (ref 0.05–0.5)
EOSINOPHILS RELATIVE PERCENT: 0.2 % (ref 0–6)
FIBRINOGEN: 598 MG/DL (ref 225–540)
GFR AFRICAN AMERICAN: >60
GFR NON-AFRICAN AMERICAN: >60 ML/MIN/1.73
GLUCOSE BLD-MCNC: 126 MG/DL (ref 74–99)
HCT VFR BLD CALC: 31.8 % (ref 37–54)
HEMOGLOBIN: 10.1 G/DL (ref 12.5–16.5)
IMMATURE GRANULOCYTES #: 0.18 E9/L
IMMATURE GRANULOCYTES %: 1.3 % (ref 0–5)
LYMPHOCYTES ABSOLUTE: 1.78 E9/L (ref 1.5–4)
LYMPHOCYTES RELATIVE PERCENT: 13.3 % (ref 20–42)
MCH RBC QN AUTO: 23.9 PG (ref 26–35)
MCHC RBC AUTO-ENTMCNC: 31.8 % (ref 32–34.5)
MCV RBC AUTO: 75.4 FL (ref 80–99.9)
METER GLUCOSE: 119 MG/DL (ref 74–99)
METER GLUCOSE: 164 MG/DL (ref 74–99)
METER GLUCOSE: 235 MG/DL (ref 74–99)
METER GLUCOSE: 259 MG/DL (ref 74–99)
METER GLUCOSE: 277 MG/DL (ref 74–99)
MONOCYTES ABSOLUTE: 1.1 E9/L (ref 0.1–0.95)
MONOCYTES RELATIVE PERCENT: 8.2 % (ref 2–12)
NEUTROPHILS ABSOLUTE: 10.3 E9/L (ref 1.8–7.3)
NEUTROPHILS RELATIVE PERCENT: 76.9 % (ref 43–80)
PDW BLD-RTO: 15.1 FL (ref 11.5–15)
PLATELET # BLD: 374 E9/L (ref 130–450)
PMV BLD AUTO: 11.2 FL (ref 7–12)
POTASSIUM REFLEX MAGNESIUM: 4 MMOL/L (ref 3.5–5)
POTASSIUM SERPL-SCNC: 4 MMOL/L (ref 3.5–5)
RBC # BLD: 4.22 E12/L (ref 3.8–5.8)
SODIUM BLD-SCNC: 140 MMOL/L (ref 132–146)
TOTAL PROTEIN: 5.4 G/DL (ref 6.4–8.3)
WBC # BLD: 13.4 E9/L (ref 4.5–11.5)

## 2020-12-26 PROCEDURE — 85025 COMPLETE CBC W/AUTO DIFF WBC: CPT

## 2020-12-26 PROCEDURE — 85384 FIBRINOGEN ACTIVITY: CPT

## 2020-12-26 PROCEDURE — 6370000000 HC RX 637 (ALT 250 FOR IP): Performed by: FAMILY MEDICINE

## 2020-12-26 PROCEDURE — 85378 FIBRIN DEGRADE SEMIQUANT: CPT

## 2020-12-26 PROCEDURE — 2060000000 HC ICU INTERMEDIATE R&B

## 2020-12-26 PROCEDURE — 36415 COLL VENOUS BLD VENIPUNCTURE: CPT

## 2020-12-26 PROCEDURE — 82962 GLUCOSE BLOOD TEST: CPT

## 2020-12-26 PROCEDURE — 86140 C-REACTIVE PROTEIN: CPT

## 2020-12-26 PROCEDURE — 6360000002 HC RX W HCPCS: Performed by: FAMILY MEDICINE

## 2020-12-26 PROCEDURE — 80053 COMPREHEN METABOLIC PANEL: CPT

## 2020-12-26 RX ORDER — INSULIN GLARGINE 100 [IU]/ML
0.25 INJECTION, SOLUTION SUBCUTANEOUS NIGHTLY
Qty: 1 VIAL | Refills: 3 | Status: SHIPPED | OUTPATIENT
Start: 2020-12-26 | End: 2020-12-27 | Stop reason: HOSPADM

## 2020-12-26 RX ORDER — HYDROCORTISONE 25 MG/G
CREAM TOPICAL
Qty: 1 TUBE | Refills: 0 | Status: SHIPPED | OUTPATIENT
Start: 2020-12-26 | End: 2020-12-28

## 2020-12-26 RX ORDER — DEXAMETHASONE 6 MG/1
6 TABLET ORAL DAILY
Qty: 5 TABLET | Refills: 0 | Status: SHIPPED | OUTPATIENT
Start: 2020-12-27 | End: 2020-12-27

## 2020-12-26 RX ORDER — PEN NEEDLE, DIABETIC 31 GX5/16"
1 NEEDLE, DISPOSABLE MISCELLANEOUS DAILY
Qty: 100 EACH | Refills: 3 | Status: SHIPPED | OUTPATIENT
Start: 2020-12-26 | End: 2020-12-27 | Stop reason: HOSPADM

## 2020-12-26 RX ADMIN — DEXAMETHASONE 6 MG: 4 TABLET ORAL at 08:05

## 2020-12-26 RX ADMIN — INSULIN GLARGINE 24 UNITS: 100 INJECTION, SOLUTION SUBCUTANEOUS at 00:23

## 2020-12-26 RX ADMIN — HYDROCORTISONE: 25 CREAM TOPICAL at 22:02

## 2020-12-26 RX ADMIN — INSULIN LISPRO 1 UNITS: 100 INJECTION, SOLUTION INTRAVENOUS; SUBCUTANEOUS at 00:26

## 2020-12-26 RX ADMIN — INSULIN GLARGINE 24 UNITS: 100 INJECTION, SOLUTION SUBCUTANEOUS at 21:09

## 2020-12-26 RX ADMIN — HYDROCORTISONE: 25 CREAM TOPICAL at 12:43

## 2020-12-26 RX ADMIN — Medication 50 MG: at 08:06

## 2020-12-26 RX ADMIN — ENOXAPARIN SODIUM 100 MG: 100 INJECTION SUBCUTANEOUS at 18:02

## 2020-12-26 RX ADMIN — Medication 1000 UNITS: at 08:05

## 2020-12-26 RX ADMIN — INSULIN LISPRO 1 UNITS: 100 INJECTION, SOLUTION INTRAVENOUS; SUBCUTANEOUS at 14:34

## 2020-12-26 RX ADMIN — OXYCODONE HYDROCHLORIDE AND ACETAMINOPHEN 500 MG: 500 TABLET ORAL at 08:06

## 2020-12-26 RX ADMIN — INSULIN LISPRO 3 UNITS: 100 INJECTION, SOLUTION INTRAVENOUS; SUBCUTANEOUS at 18:05

## 2020-12-26 RX ADMIN — INSULIN LISPRO 2 UNITS: 100 INJECTION, SOLUTION INTRAVENOUS; SUBCUTANEOUS at 21:09

## 2020-12-26 RX ADMIN — ENOXAPARIN SODIUM 100 MG: 100 INJECTION SUBCUTANEOUS at 04:57

## 2020-12-26 ASSESSMENT — PAIN SCALES - GENERAL
PAINLEVEL_OUTOF10: 0

## 2020-12-26 ASSESSMENT — PAIN DESCRIPTION - PROGRESSION
CLINICAL_PROGRESSION: GRADUALLY IMPROVING
CLINICAL_PROGRESSION: GRADUALLY IMPROVING

## 2020-12-26 NOTE — PROGRESS NOTES
1. Patchy multifocal bilateral pulmonary infiltrates unchanged compared with   the prior study. Assessment/Plan:    Active Hospital Problems    Diagnosis    Pneumonia due to COVID-19 virus [U07.1, J12.89]     Pneumonia due to COVID-19  -Continue dexamethasone(day 5/10)  -Continue remdesivir(day 5)  -Trend inflammatory markers  CRP is 1.1    Acute hypoxic respiratory failure -due to COVID-19 and pulmonary emboli  Not on oxygen    Multiple pulmonary emboli right lung in the setting of COVID-19  -Lovenox held for hemorrhoid surgery. D/C on eliquis. Prolapsed hemorrhoids  No surgery recommended  Use Anusol HC cream     DM II  -Continue lantus and insulin coverage. Chronic anemia-Likely blood loss from hemorrhoids  Hemoglobin is stable at 10.1    DVT Prophylaxis:Lovenox on hold for surgery.   Diet: DIET CARB CONTROL; Carb Control: 4 carb choices (60 gms)/meal  Code Status: DNR-CCA    PT/OT Eval Status:Ordered    Dispo -home today  No pharmacy has been entered in system delaying discharge  discharge once pharmacy added to system    Jigar Smith MD

## 2020-12-26 NOTE — PROGRESS NOTES
Pt's discharge held due to pt needing to see a diabetic educator and us needing to schedule a new pcp for him. Diabetic educator order placed.

## 2020-12-27 LAB
ALBUMIN SERPL-MCNC: 2.7 G/DL (ref 3.5–5.2)
ALP BLD-CCNC: 61 U/L (ref 40–129)
ALT SERPL-CCNC: 24 U/L (ref 0–40)
ANION GAP SERPL CALCULATED.3IONS-SCNC: 9 MMOL/L (ref 7–16)
AST SERPL-CCNC: 22 U/L (ref 0–39)
BASOPHILS ABSOLUTE: 0.01 E9/L (ref 0–0.2)
BASOPHILS RELATIVE PERCENT: 0.1 % (ref 0–2)
BILIRUB SERPL-MCNC: 0.4 MG/DL (ref 0–1.2)
BUN BLDV-MCNC: 21 MG/DL (ref 8–23)
C-REACTIVE PROTEIN: 0.7 MG/DL (ref 0–0.4)
CALCIUM SERPL-MCNC: 8.2 MG/DL (ref 8.6–10.2)
CHLORIDE BLD-SCNC: 108 MMOL/L (ref 98–107)
CHOLESTEROL, TOTAL: 68 MG/DL (ref 0–199)
CO2: 22 MMOL/L (ref 22–29)
CREAT SERPL-MCNC: 0.9 MG/DL (ref 0.7–1.2)
D DIMER: 532 NG/ML DDU
EOSINOPHILS ABSOLUTE: 0.04 E9/L (ref 0.05–0.5)
EOSINOPHILS RELATIVE PERCENT: 0.3 % (ref 0–6)
FIBRINOGEN: 570 MG/DL (ref 225–540)
GFR AFRICAN AMERICAN: >60
GFR NON-AFRICAN AMERICAN: >60 ML/MIN/1.73
GLUCOSE BLD-MCNC: 111 MG/DL (ref 74–99)
HBA1C MFR BLD: 8.7 % (ref 4–5.6)
HCT VFR BLD CALC: 31.6 % (ref 37–54)
HDLC SERPL-MCNC: 25 MG/DL
HEMOGLOBIN: 10.4 G/DL (ref 12.5–16.5)
IMMATURE GRANULOCYTES #: 0.25 E9/L
IMMATURE GRANULOCYTES %: 1.7 % (ref 0–5)
LDL CHOLESTEROL CALCULATED: 34 MG/DL (ref 0–99)
LYMPHOCYTES ABSOLUTE: 2.18 E9/L (ref 1.5–4)
LYMPHOCYTES RELATIVE PERCENT: 14.4 % (ref 20–42)
MCH RBC QN AUTO: 24.1 PG (ref 26–35)
MCHC RBC AUTO-ENTMCNC: 32.9 % (ref 32–34.5)
MCV RBC AUTO: 73.1 FL (ref 80–99.9)
METER GLUCOSE: 100 MG/DL (ref 74–99)
METER GLUCOSE: 180 MG/DL (ref 74–99)
METER GLUCOSE: 291 MG/DL (ref 74–99)
METER GLUCOSE: 343 MG/DL (ref 74–99)
MONOCYTES ABSOLUTE: 1.25 E9/L (ref 0.1–0.95)
MONOCYTES RELATIVE PERCENT: 8.3 % (ref 2–12)
NEUTROPHILS ABSOLUTE: 11.38 E9/L (ref 1.8–7.3)
NEUTROPHILS RELATIVE PERCENT: 75.2 % (ref 43–80)
PDW BLD-RTO: 15.1 FL (ref 11.5–15)
PLATELET # BLD: 414 E9/L (ref 130–450)
PMV BLD AUTO: 10.5 FL (ref 7–12)
POTASSIUM REFLEX MAGNESIUM: 3.7 MMOL/L (ref 3.5–5)
POTASSIUM SERPL-SCNC: 3.7 MMOL/L (ref 3.5–5)
RBC # BLD: 4.32 E12/L (ref 3.8–5.8)
SODIUM BLD-SCNC: 139 MMOL/L (ref 132–146)
TOTAL PROTEIN: 5.4 G/DL (ref 6.4–8.3)
TRIGL SERPL-MCNC: 46 MG/DL (ref 0–149)
TSH SERPL DL<=0.05 MIU/L-ACNC: 1.81 UIU/ML (ref 0.27–4.2)
VITAMIN D 25-HYDROXY: 13 NG/ML (ref 30–100)
VLDLC SERPL CALC-MCNC: 9 MG/DL
WBC # BLD: 15.1 E9/L (ref 4.5–11.5)

## 2020-12-27 PROCEDURE — 6360000002 HC RX W HCPCS: Performed by: FAMILY MEDICINE

## 2020-12-27 PROCEDURE — 6370000000 HC RX 637 (ALT 250 FOR IP): Performed by: NURSE PRACTITIONER

## 2020-12-27 PROCEDURE — 6370000000 HC RX 637 (ALT 250 FOR IP): Performed by: FAMILY MEDICINE

## 2020-12-27 PROCEDURE — 80053 COMPREHEN METABOLIC PANEL: CPT

## 2020-12-27 PROCEDURE — 84443 ASSAY THYROID STIM HORMONE: CPT

## 2020-12-27 PROCEDURE — 82962 GLUCOSE BLOOD TEST: CPT

## 2020-12-27 PROCEDURE — 80061 LIPID PANEL: CPT

## 2020-12-27 PROCEDURE — 82306 VITAMIN D 25 HYDROXY: CPT

## 2020-12-27 PROCEDURE — 83036 HEMOGLOBIN GLYCOSYLATED A1C: CPT

## 2020-12-27 PROCEDURE — 85384 FIBRINOGEN ACTIVITY: CPT

## 2020-12-27 PROCEDURE — 85025 COMPLETE CBC W/AUTO DIFF WBC: CPT

## 2020-12-27 PROCEDURE — 36415 COLL VENOUS BLD VENIPUNCTURE: CPT

## 2020-12-27 PROCEDURE — 86140 C-REACTIVE PROTEIN: CPT

## 2020-12-27 PROCEDURE — 2060000000 HC ICU INTERMEDIATE R&B

## 2020-12-27 PROCEDURE — 85378 FIBRIN DEGRADE SEMIQUANT: CPT

## 2020-12-27 RX ORDER — CHOLECALCIFEROL (VITAMIN D3) 25 MCG
1000 TABLET ORAL DAILY
Qty: 60 TABLET | Refills: 0 | Status: SHIPPED | OUTPATIENT
Start: 2020-12-28 | End: 2020-12-28

## 2020-12-27 RX ORDER — GLIPIZIDE 5 MG/1
5 TABLET ORAL DAILY
Qty: 60 TABLET | Refills: 0 | Status: SHIPPED | OUTPATIENT
Start: 2020-12-27 | End: 2020-12-28 | Stop reason: SDUPTHER

## 2020-12-27 RX ORDER — DEXAMETHASONE 6 MG/1
6 TABLET ORAL DAILY
Qty: 3 TABLET | Refills: 0 | Status: SHIPPED | OUTPATIENT
Start: 2020-12-28 | End: 2020-12-28

## 2020-12-27 RX ADMIN — HYDROCORTISONE: 25 CREAM TOPICAL at 12:12

## 2020-12-27 RX ADMIN — INSULIN GLARGINE 24 UNITS: 100 INJECTION, SOLUTION SUBCUTANEOUS at 21:22

## 2020-12-27 RX ADMIN — APIXABAN 10 MG: 5 TABLET, FILM COATED ORAL at 12:11

## 2020-12-27 RX ADMIN — Medication 50 MG: at 10:44

## 2020-12-27 RX ADMIN — HYDROCORTISONE: 25 CREAM TOPICAL at 20:20

## 2020-12-27 RX ADMIN — Medication 1000 UNITS: at 09:16

## 2020-12-27 RX ADMIN — INSULIN LISPRO 3 UNITS: 100 INJECTION, SOLUTION INTRAVENOUS; SUBCUTANEOUS at 17:31

## 2020-12-27 RX ADMIN — ENOXAPARIN SODIUM 100 MG: 100 INJECTION SUBCUTANEOUS at 03:45

## 2020-12-27 RX ADMIN — OXYCODONE HYDROCHLORIDE AND ACETAMINOPHEN 500 MG: 500 TABLET ORAL at 09:16

## 2020-12-27 RX ADMIN — DEXAMETHASONE 6 MG: 4 TABLET ORAL at 09:16

## 2020-12-27 RX ADMIN — INSULIN LISPRO 1 UNITS: 100 INJECTION, SOLUTION INTRAVENOUS; SUBCUTANEOUS at 12:24

## 2020-12-27 RX ADMIN — INSULIN LISPRO 2 UNITS: 100 INJECTION, SOLUTION INTRAVENOUS; SUBCUTANEOUS at 21:22

## 2020-12-27 RX ADMIN — APIXABAN 10 MG: 5 TABLET, FILM COATED ORAL at 20:20

## 2020-12-27 ASSESSMENT — PAIN SCALES - GENERAL
PAINLEVEL_OUTOF10: 0

## 2020-12-27 ASSESSMENT — PAIN DESCRIPTION - PROGRESSION: CLINICAL_PROGRESSION: GRADUALLY IMPROVING

## 2020-12-27 NOTE — DISCHARGE INSTR - DIET
Good nutrition is important when healing from an illness, injury, or surgery. Follow any nutrition recommendations given to you during your hospital stay. If you were given an oral nutrition supplement while in the hospital, continue to take this supplement at home. You can take it with meals, in-between meals, and/or before bedtime. These supplements can be purchased at most local grocery stores, pharmacies, and chain [employer]-stores. If you have any questions about your diet or nutrition, call the hospital and ask for the dietitian.     Carb controlled diet

## 2020-12-27 NOTE — PROGRESS NOTES
HEENT: Normal cephalic, atraumatic without obvious deformity. Pupils equal, round, and reactive to light. Extra ocular muscles intact. Conjunctivae/corneas clear. Neck: Supple, with full range of motion. No jugular venous distention. Trachea midline. Respiratory:  Clear to auscultation bilaterally. No apparent distress. Cardiovascular:  Regular rate and rhythm. S1, S2 without murmurs, rubs, or gallops. PV: Brisk capillary refill. +2 pedal and radial pulses bilaterally. No clubbing, cyanosis, edema of bilateral lower extremities. Abdomen: Soft, non-tender, non-distended. +BS  Musculoskeletal: No obvious deformities or erythematous or edematous joints. Skin: Normal skin color. No rashes or lesions. Neurologic:  Neurovascularly intact without any focal sensory/motor deficits.  Cranial nerves: II-XII intact, grossly non-focal.  Psychiatric: Alert and oriented, thought content appropriate, normal insight    Medications:  REVIEWED DAILY    Infusion Medications    dextrose       Scheduled Medications    hydrocortisone   Rectal BID    sodium chloride flush  10 mL Intravenous 2 times per day    dexamethasone  6 mg Oral Daily    zinc sulfate  50 mg Oral Daily    Vitamin D  1,000 Units Oral Daily    ascorbic acid  500 mg Oral Daily    insulin glargine  0.25 Units/kg Subcutaneous Nightly    insulin lispro  0-6 Units Subcutaneous TID WC    insulin lispro  0-3 Units Subcutaneous Nightly    enoxaparin  1 mg/kg Subcutaneous Q12H     PRN Meds: lidocaine, morphine, sodium chloride flush, promethazine **OR** ondansetron, polyethylene glycol, acetaminophen **OR** acetaminophen, potassium chloride **OR** potassium alternative oral replacement **OR** potassium chloride, magnesium sulfate, glucose, dextrose, glucagon (rDNA), dextrose, sodium chloride    Labs:     Recent Labs     12/25/20  0455 12/25/20  1640 12/26/20  0455 12/27/20  0350   WBC 12.7*  --  13.4* 15.1*   HGB 10.9*  10.8* 10.5* 10.1* 10.4* HCT 34.0*  34.0* 33.3* 31.8* 31.6*     --  374 414       Recent Labs     12/25/20  0455 12/26/20  0455 12/27/20  0350    140 139   K 3.9  3.9 4.0  4.0 3.7  3.7    110* 108*   CO2 21* 18* 22   BUN 23 22 21   CREATININE 0.9 0.9 0.9   CALCIUM 8.3* 8.1* 8.2*       Recent Labs     12/25/20  0455 12/26/20  0455 12/27/20  0350   PROT 5.8* 5.4* 5.4*   ALKPHOS 65 61 61   ALT 17 20 24   AST 23 23 22   BILITOT 0.3 0.3 0.4       Chronic labs:    Lab Results   Component Value Date    LABA1C 7.6 (H) 01/06/2020       Radiology: REVIEWED DAILY    ASSESSMENT:  Active Problems:    Pneumonia due to COVID-19 virus  Resolved Problems:    * No resolved hospital problems. *      PLAN:    Decadron 7/10  Switch Lovenox to Eliquis   Arrange PCP follow up  Diabetes educator not here to complete consult until tomorrow. DISPOSITION: Home tomorrow after diabetes educator consult.    +++++++++++++++++++++++++++++++++++++++++++++++++  ADELAIDA Daniel/ Micah [address]  +++++++++++++++++++++++++++++++++++++++++++++++++  NOTE: This report was transcribed using voice recognition software. Every effort was made to ensure accuracy; however, inadvertent computerized transcription errors may be present.

## 2020-12-27 NOTE — DISCHARGE SUMMARY
Hospitalist Discharge Summary    Patient ID: Golden Garcia   Patient : 1950  Patient's PCP: No primary care provider on file. Admit Date: 2020   Admitting Physician: Ronnie Holguin DO    Discharge Date:  2020   Discharge Physician: KENTON Ragland NP   Discharge Condition: Stable  Discharge Disposition: [facility] course in brief:  (Please refer to daily progress notes for a comprehensive review of the hospitalization by requesting medical records)     Patient admitted on 2020 for acute hypoxic respiratory failure. Patient diagnosed with COVID-19 on the .  Presents the ER now with complaints of shortness of breath worse with exertion.  Does not wear oxygen at baseline.  Denies fever, chills, nausea, vomiting, chest pain, abdominal pain or diarrhea. On arrival patient's SpO2 was found to be in the 80's on room air.  Chest x-ray reveals patchy multifocal bilateral pulmonary infiltrates unchanged from study on .  CTA positive for bilateral pulmonary emboli and possible mild right ventricle strain. The patient was started on therapeutic Lovenox, Decadron, remdesivir, and vitamins. The patient developed bleeding of chronic hemorrhoids and general surgery was consulted. No surgical intervention necessary. Remdesivir completed and hypoxia resolved. Patient is stable for discharge home. Appointment with a PCP will be arranged prior to discharge. Consults:   IP CONSULT TO HOSPITALIST  IP CONSULT TO GENERAL SURGERY    Discharge Diagnoses:    Pneumonia due to COVID-19  Bilateral pulmonary emboli  Newly diagnosed diabetes mellitus type 2  Anemiasecondary to bleeding hemorrhoids  Leukocytosis  Vitamin D deficiency    Discharge Instructions / Follow up:     Follow up with PCP within 1 week of discharge  Follow up with general surgery, Dr. Velia Jones, in 2 weeks The patient's condition is stable. At this time the patient is without objective evidence of an acute process requiring continuing hospitalization or inpatient management. They are stable for discharge with outpatient follow-up. I have spoken with the patient and discussed the results of the current hospitalization, in addition to providing specific details for the plan of care and counseling regarding the diagnosis and prognosis. The plan has been discussed in detail and they are aware of the specific conditions for emergent return, as well as the importance of follow-up. Their questions are answered at this time and they are agreeable with the plan for discharge home.     Continued appropriate risk factor modification of blood pressure, diabetes and serum lipids will remain essential to reducing risk of future atherosclerotic development    Activity: activity as tolerated    Significant labs:  CBC:   Recent Labs     12/25/20 0455 12/25/20  1640 12/26/20 0455 12/27/20  0350   WBC 12.7*  --  13.4* 15.1*   RBC 4.55  --  4.22 4.32   HGB 10.9*  10.8* 10.5* 10.1* 10.4*   HCT 34.0*  34.0* 33.3* 31.8* 31.6*   MCV 74.7*  --  75.4* 73.1*   RDW 14.8  --  15.1* 15.1*     --  374 414     BMP:   Recent Labs     12/25/20 0455 12/26/20 0455 12/27/20  0350    140 139   K 3.9  3.9 4.0  4.0 3.7  3.7    110* 108*   CO2 21* 18* 22   BUN 23 22 21   CREATININE 0.9 0.9 0.9     LFT:  Recent Labs     12/25/20 0455 12/26/20  0455 12/27/20  0350   PROT 5.8* 5.4* 5.4*   ALKPHOS 65 61 61   ALT 17 20 24   AST 23 23 22   BILITOT 0.3 0.3 0.4     Hgb A1C:   Lab Results   Component Value Date    LABA1C 8.7 (H) 12/27/2020     Folate and B12: No results found for: [id number], No results found for: FOLATE  Thyroid Studies:   Lab Results   Component Value Date    TSH 1.810 12/27/2020       Imaging:  Xr Chest Portable    Result Date: 12/21/2020 EXAMINATION: ONE XRAY VIEW OF THE CHEST 12/21/2020 12:27 pm COMPARISON: 12/18/2020 HISTORY: ORDERING SYSTEM PROVIDED HISTORY: Shortness of breath TECHNOLOGIST PROVIDED HISTORY: Reason for exam:->Shortness of breath What reading provider will be dictating this exam?->CRC FINDINGS: There are patchy multifocal bilateral pulmonary infiltrates. There is no pleural effusion The heart is not enlarged. 1. Patchy multifocal bilateral pulmonary infiltrates unchanged compared with the prior study. Xr Chest Portable    Result Date: 12/18/2020  EXAMINATION: ONE XRAY VIEW OF THE CHEST 12/18/2020 10:56 am COMPARISON: None. HISTORY: ORDERING SYSTEM PROVIDED HISTORY: COVID TECHNOLOGIST PROVIDED HISTORY: Reason for exam:->COVID FINDINGS: Subtle opacities are seen at the lateral aspect of the mid and lower lung fields bilaterally, suspicious for pneumonia. The upper lung fields are clear. There is no evidence of pleural effusion. Moderate size hiatus hernia is noted. The heart and mediastinum are otherwise unremarkable with no evidence of vascular congestion. 1. Patchy bilateral opacities, consistent with pneumonia. 2. Moderate size hiatus hernia.     Cta Pulmonary W Contrast    Result Date: 12/21/2020 obvious nodules. Of the esophagus is not dilated or thickened. Distal esophagus mildly compressed by the hiatal hernia. Lungs/pleura: Large areas of ground-glass infiltrates throughout the upper lobes with a subpleural predominance. Diffuse infiltrates in the mid and lower lung fields as well. Several bands of atelectasis in right and left lower lobes. Patchy consolidation in the inferior lower lobes right more than left. Mild atelectasis along the fissures and at the posterior lung bases. No pleural effusion or pneumothorax. Upper Abdomen: No obvious liver lesions. Spleen has normal size. Adrenal glands are not enlarged. Mild lobulations in the cortical margins of bilateral upper kidney margins, there may be minimal scarring. No ascites. Soft Tissues/Bones: Moderate spurring and vacuum disc degeneration C6/C7. A mild stenosis. Mild upper thoracic kyphosis. No thoracic compression fracture. 1. Positive for PE. 2. Scattered predominantly ground-glass type bilateral lung infiltrates. 3. Bibasilar atelectasis. 4. Moderate hiatal hernia. 5. Report called to Dr. Maria Esther Otero at [facility] room 1520 hours 12/21/2020. NOTE:  This is an abnormal report.       Discharge Medications:      Medication List      START taking these medications    apixaban 5 MG Tabs tablet  Commonly known as: Eliquis  10 mg bid for 7 days then 5 mg bid     dexamethasone 6 MG tablet  Commonly known as: DECADRON  Take 1 tablet by mouth daily for 3 days  Start taking on: December 28, 2020     hydrocortisone 2.5 % Crea rectal cream  Commonly known as: ANUSOL-HC  Apply bid     metFORMIN 500 MG tablet  Commonly known as: GLUCOPHAGE  Take 1 tablet by mouth Daily with supper        STOP taking these medications    azithromycin 250 MG tablet  Commonly known as: Zithromax Z-Rodrigo           Where to Get Your Medications These medications were sent to [facility]/pharmacy [address] [facility], [address] [place] [id number] [facility]  [address] [facility], [address] [place] [address]    Phone: [phone]   · apixaban 5 MG Tabs tablet  · dexamethasone 6 MG tablet  · hydrocortisone 2.5 % Crea rectal cream  · metFORMIN 500 MG tablet         Time Spent on discharge is more than 45 minutes in the examination, evaluation, counseling and review of medications and discharge plan.    +++++++++++++++++++++++++++++++++++++++++++++++++  ADELAIDA Larios/ Micah [address]  +++++++++++++++++++++++++++++++++++++++++++++++++  NOTE: This report was transcribed using voice recognition software. Every effort was made to ensure accuracy; however, inadvertent computerized transcription errors may be present.

## 2020-12-27 NOTE — PROGRESS NOTES
Patients daughter would like  to speak to the patient about filing for unemployment since he won't be able to return to work.

## 2020-12-28 VITALS
OXYGEN SATURATION: 98 % | DIASTOLIC BLOOD PRESSURE: 69 MMHG | HEART RATE: 73 BPM | BODY MASS INDEX: 28.49 KG/M2 | HEIGHT: 73 IN | WEIGHT: 215 LBS | TEMPERATURE: 98.1 F | SYSTOLIC BLOOD PRESSURE: 122 MMHG | RESPIRATION RATE: 18 BRPM

## 2020-12-28 PROBLEM — K64.9 HEMORRHOID: Status: ACTIVE | Noted: 2020-12-28

## 2020-12-28 PROBLEM — E11.9 DIABETES MELLITUS, NEW ONSET (HCC): Status: ACTIVE | Noted: 2020-12-28

## 2020-12-28 LAB
METER GLUCOSE: 139 MG/DL (ref 74–99)
METER GLUCOSE: 267 MG/DL (ref 74–99)

## 2020-12-28 PROCEDURE — 6370000000 HC RX 637 (ALT 250 FOR IP): Performed by: NURSE PRACTITIONER

## 2020-12-28 PROCEDURE — 6360000002 HC RX W HCPCS: Performed by: FAMILY MEDICINE

## 2020-12-28 PROCEDURE — 82962 GLUCOSE BLOOD TEST: CPT

## 2020-12-28 PROCEDURE — 6370000000 HC RX 637 (ALT 250 FOR IP): Performed by: FAMILY MEDICINE

## 2020-12-28 RX ORDER — DEXAMETHASONE 6 MG/1
6 TABLET ORAL DAILY
Qty: 3 TABLET | Refills: 0 | Status: SHIPPED | OUTPATIENT
Start: 2020-12-28 | End: 2020-12-31

## 2020-12-28 RX ORDER — HYDROCORTISONE 25 MG/G
CREAM TOPICAL
Qty: 1 TUBE | Refills: 0 | Status: SHIPPED | OUTPATIENT
Start: 2020-12-28 | End: 2021-01-04 | Stop reason: SDUPTHER

## 2020-12-28 RX ORDER — CHOLECALCIFEROL (VITAMIN D3) 25 MCG
1000 TABLET ORAL DAILY
Qty: 60 TABLET | Refills: 0 | Status: SHIPPED | OUTPATIENT
Start: 2020-12-28 | End: 2021-01-04 | Stop reason: SDUPTHER

## 2020-12-28 RX ORDER — GLIPIZIDE 5 MG/1
5 TABLET ORAL DAILY
Qty: 60 TABLET | Refills: 0 | Status: SHIPPED | OUTPATIENT
Start: 2020-12-28 | End: 2021-01-04 | Stop reason: SDUPTHER

## 2020-12-28 RX ADMIN — APIXABAN 10 MG: 5 TABLET, FILM COATED ORAL at 09:56

## 2020-12-28 RX ADMIN — Medication 50 MG: at 09:57

## 2020-12-28 RX ADMIN — Medication 1000 UNITS: at 09:57

## 2020-12-28 RX ADMIN — OXYCODONE HYDROCHLORIDE AND ACETAMINOPHEN 500 MG: 500 TABLET ORAL at 09:56

## 2020-12-28 RX ADMIN — HYDROCORTISONE: 25 CREAM TOPICAL at 09:57

## 2020-12-28 RX ADMIN — DEXAMETHASONE 6 MG: 4 TABLET ORAL at 09:57

## 2020-12-28 RX ADMIN — INSULIN LISPRO 3 UNITS: 100 INJECTION, SOLUTION INTRAVENOUS; SUBCUTANEOUS at 11:16

## 2020-12-28 ASSESSMENT — PAIN SCALES - GENERAL
PAINLEVEL_OUTOF10: 0
PAINLEVEL_OUTOF10: 0

## 2020-12-28 NOTE — DISCHARGE SUMMARY
Hospital Medicine Discharge Summary    Patient: Brenda Ibrahim     Gender: male  : 1950   Age: 69 y.o. MRN: [id number]    Admitting Physician: Laine Miranda DO  Discharge Physician: KENTON Diaz - CNS, CNP    Code Status: DNR-CCA     Admit Date: 2020   Discharge Date:   20     Disposition:  Home    Discharge Diagnoses: Active Hospital Problems    Diagnosis Date Noted    Hemorrhoid [K64.9] 2020    Diabetes mellitus, new onset ([address] [E11.9] 2020    Pneumonia due to COVID-19 virus [U07.1, J12.89] 2020       Follow-up appointments:  one week    Outpatient to do list: medications as ordered, diabetes education, general surgery follow up    Condition at Discharge:  [address] Course:   Patient admitted on 2020 for acute hypoxic respiratory failure. Patient diagnosed with COVID-19 on the .  Presents the ER now with complaints of shortness of breath worse with exertion.  Does not wear oxygen at baseline.  Denies fever, chills, nausea, vomiting, chest pain, abdominal pain or diarrhea. On arrival patient's SpO2 was found to be in the 80's on room air.  Chest x-ray reveals patchy multifocal bilateral pulmonary infiltrates unchanged from study on .  CTA positive for bilateral pulmonary emboli and possible mild right ventricle strain.  The patient was started on therapeutic Lovenox, Decadron, remdesivir, and vitamins.  The patient developed bleeding of chronic hemorrhoids and general surgery was consulted.  No surgical intervention necessary. Remdesivir completed and hypoxia resolved. : Stable for discharge as of yesterday but waiting to see diabetic educator. Vinnie Squires to be discharged on insulin, however, patient is a  and he would not be able to work if he was insulin dependent.  Will discharge on home dose of metformin + glipizide with PCP follow up.  : BGL improved.  seen by diabetes educator    Discharge Medications:   Current Discharge Medication List      START taking these medications    Details   dexamethasone (DECADRON) 6 MG tablet Take 1 tablet by mouth daily for 3 days  Qty: 3 tablet, Refills: 0      Cholecalciferol (VITAMIN D) 25 MCG TABS Take 1 tablet by mouth daily  Qty: 60 tablet, Refills: 0    Comments: Labeling may look different. 25 mcg=1000 Units. Please double check dosages. metFORMIN (GLUCOPHAGE) 500 MG tablet Take 2 tablets by mouth 2 times daily (with meals)  Qty: 60 tablet, Refills: 0      glipiZIDE (GLUCOTROL) 5 MG tablet Take 1 tablet by mouth daily  Qty: 60 tablet, Refills: 0      hydrocortisone (ANUSOL-HC) 2.5 % CREA rectal cream Apply bid  Qty: 1 Tube, Refills: 0      apixaban (ELIQUIS) 5 MG TABS tablet 10 mg bid for 7 days then 5 mg bid  Qty: 60 tablet, Refills: 0           Current Discharge Medication List        Current Discharge Medication List        Current Discharge Medication List      STOP taking these medications       azithromycin (ZITHROMAX Z-MICHELLE) 250 MG tablet Comments:   Reason for Stopping:               Discharge ROS:  A complete review of systems was asked and negative    Discharge Exam:    /69   Pulse 73   Temp 98.1 °F (36.7 °C) (Temporal)   Resp 18   Ht 6' 1\" (1.854 m)   Wt 215 lb (97.5 kg)   SpO2 98%   BMI 28.37 kg/m²   General appearance:  NAD  HEENT:   Normal cephalic, atraumatic, moist mucous membranes, no oropharyngeal erythema or exudate  Neck: Supple, trachea midline, no anterior cervical or SC LAD  Heart[de-identified] Normal s1/s2, RRR, no murmurs, gallops, or rubs. no leg edema  Lungs:  clear bilaterally, no wheeze, no rales or rhonchi, no use of accessory musclesNormal respiratory effort. Clear to auscultation, bilaterally without Rales/Wheezes/Rhonchi.   Abdomen: Soft, non-tender, non-distended, bowel sounds present, no masses  Musculoskeletal:  No clubbing, no cyanosis, no edema  Skin: No lesion or masses  Neurologic:  Neurovascularly intact without any focal sensory/motor deficits. Psychiatric:  A & O x3  Neuro: Grossly intact, moves all four extremities     Labs: For convenience and continuity at follow-up the following most recent labs are provided:    Lab Results   Component Value Date    WBC 15.1 12/27/2020    HGB 10.4 12/27/2020    HCT 31.6 12/27/2020    MCV 73.1 12/27/2020     12/27/2020     12/27/2020    K 3.7 12/27/2020    K 3.7 12/27/2020     12/27/2020    CO2 22 12/27/2020    BUN 21 12/27/2020    CREATININE 0.9 12/27/2020    CALCIUM 8.2 12/27/2020    ALKPHOS 61 12/27/2020    ALT 24 12/27/2020    AST 22 12/27/2020    BILITOT 0.4 12/27/2020    LABALBU 2.7 12/27/2020    LDLCALC 34 12/27/2020    TRIG 46 12/27/2020     No results found for: INR    Radiology:  Xr Chest Portable    Result Date: 12/21/2020  EXAMINATION: ONE XRAY VIEW OF THE CHEST 12/21/2020 12:27 pm COMPARISON: 12/18/2020 HISTORY: ORDERING SYSTEM PROVIDED HISTORY: Shortness of breath TECHNOLOGIST PROVIDED HISTORY: Reason for exam:->Shortness of breath What reading provider will be dictating this exam?->CRC FINDINGS: There are patchy multifocal bilateral pulmonary infiltrates. There is no pleural effusion The heart is not enlarged. 1. Patchy multifocal bilateral pulmonary infiltrates unchanged compared with the prior study. Xr Chest Portable    Result Date: 12/18/2020  EXAMINATION: ONE XRAY VIEW OF THE CHEST 12/18/2020 10:56 am COMPARISON: None. HISTORY: ORDERING SYSTEM PROVIDED HISTORY: COVID TECHNOLOGIST PROVIDED HISTORY: Reason for exam:->COVID FINDINGS: Subtle opacities are seen at the lateral aspect of the mid and lower lung fields bilaterally, suspicious for pneumonia. The upper lung fields are clear. There is no evidence of pleural effusion. Moderate size hiatus hernia is noted. The heart and mediastinum are otherwise unremarkable with no evidence of vascular congestion. 1. Patchy bilateral opacities, consistent with pneumonia.  2. Moderate size hiatus hernia. Cta Pulmonary W Contrast    Result Date: 12/21/2020  EXAMINATION: CTA OF THE CHEST 12/21/2020 2:44 pm TECHNIQUE: CTA of the chest was performed after the administration of intravenous contrast.  Multiplanar reformatted images are provided for review. MIP images are provided for review. Dose modulation, iterative reconstruction, and/or weight based adjustment of the mA/kV was utilized to reduce the radiation dose to as low as reasonably achievable. Contrast is 100 mL Isovue 370 IV. Dose is total .74 M Gy cm. COMPARISON: Portable chest 12/21/2020. HISTORY: ORDERING SYSTEM PROVIDED HISTORY: pe TECHNOLOGIST PROVIDED HISTORY: Reason for exam:->pe What reading provider will be dictating this exam?->CRC FINDINGS: Pulmonary Arteries: Positive for pulmonary emboli. There is a moderate size clot within the proximal right middle lobe pulmonary artery branching into the lateral segment and with some additional tiny peripheral emboli in the medial segment right middle lobe. At least 1 or 2 tiny peripheral emboli are suggested in the right lower lobe. There is a tiny non occluding clot in the left lower lobe main pulmonary artery. Suspect at least 1 or 2 tiny peripheral left lower lobe emboli. The central pulmonary arteries are normal size. Mild dilatation of the left and right ventricles are present. There may be mild RV strain. Mediastinum: Moderate retrocardiac hiatal hernia. The hernia contains the proximal 2/3 of the stomach. The gastric fundus wraps posteriorly, looping back upon itself, compressing the distal esophagus. Mild amount of herniated fat as well. A few small lymph nodes in the herniated fat. Small bilateral hilar and mediastinal lymph nodes. Small paratracheal lymph nodes up to 1.3 cm. Slightly plump 3 cm by 2 cm subcarinal lymph node. No aortic aneurysm or dissection.   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 Normal size of the thyroid with no obvious nodules. Of the esophagus is not dilated or thickened. Distal esophagus mildly compressed by the hiatal hernia. Lungs/pleura: Large areas of ground-glass infiltrates throughout the upper lobes with a subpleural predominance. Diffuse infiltrates in the mid and lower lung fields as well. Several bands of atelectasis in right and left lower lobes. Patchy consolidation in the inferior lower lobes right more than left. Mild atelectasis along the fissures and at the posterior lung bases. No pleural effusion or pneumothorax. Upper Abdomen: No obvious liver lesions. Spleen has normal size. Adrenal glands are not enlarged. Mild lobulations in the cortical margins of bilateral upper kidney margins, there may be minimal scarring. No ascites. Soft Tissues/Bones: Moderate spurring and vacuum disc degeneration C6/C7. A mild stenosis. Mild upper thoracic kyphosis. No thoracic compression fracture. 1. Positive for PE. 2. Scattered predominantly ground-glass type bilateral lung infiltrates. 3. Bibasilar atelectasis. 4. Moderate hiatal hernia. 5. Report called to Dr. Monica Hussein at [facility] emergency room 1520 hours 12/21/2020. NOTE:  This is an abnormal report. EKG     Rhythm: normal sinus   Rate: normal  Clinical Impression: no acute changes        The patient was seen and examined on day of discharge and this discharge summary is in conjunction with any daily progress note from day of discharge. Time Spent on discharge is 30 minutes  in the examination, evaluation, counseling and review of medications and discharge plan. Note that more than 30 minutes was spent in preparing discharge papers, discussing discharge with patient, medication review, etc.       Signed:    KENTON Figueroa - CNS, CNP  12/28/2020      Thank you No primary care provider on file. for the opportunity to be involved in this patient's care.  If you have any questions or concerns please feel free to contact me at [phone]

## 2020-12-28 NOTE — PROGRESS NOTES
[address]   Department of Pharmacy   Pharmacist Transition of Care Services         Patient Demographics  Name:  Camilla De Jesus   Medical Record Number:  [id number]  Gender:  male   Age:  69 y.o. YOB: 1950    Readmission Risk: 15%       Pharmacist Review and Summary of Medications     Date of last reviewed/update: 12/28/20     Category Comments   New Medication Started   1. Hydrocortisone (Anusol-HC) rectal cream  2. Apixaban 10 mg BID x 7 days then 5 mg BID  3. Glipizide 5 mg daily   4. Metformin 1000 mg BID  5. Dexamethasone 6 mg daily x 3 days  6. Vitamin D 25 mcg daily       Change in Outpatient Medication      Discontinued/On hold Outpatient Medication       Other            Documentation of Pharmacist Interventions and Follow-up Plan:     The following Pharmacist Transition of Care Services were completed:   [x]  Reviewed and summarized medication changes  []  Entire home medication list was reviewed for accuracy  []  Home medication list was updated or corrected    [x]  Reviewed discharge medication reconciliation  []  Discharge medication list was updated or corrected    [x]  Added information to AVS   []  Patient education was provided on new medications  []  Patient education was provided on medication changes  []  Reviewed the AVS with patient    Additional Interventions:  []  Inpatient prescriber was contacted and the following pharmacy recommendations        were accepted: **     [] Other interventions: **        Pharmacist: Marjorie PorrasD, formerly [facility]  Date:  12/28/2020 2:53 PM  Time spent counseling patient face-to-face OR over the phone on medications: 0 minutes

## 2020-12-28 NOTE — PROGRESS NOTES
Reason for consult:  Lifestyle modification education    A1C: 8.7 % (12/20)     [] Not available                 Patient states the following concerns/barriers to diabetes self-management:       [] None       [] Medication cost   [] Food cost/availability          [] Reading  [] Hearing   [] Vision                  [x] Work    [] Transportation  [] No insurance    [] Physical limitations    [] Other:              Diabetes survival packet provided to:   [x] Patient     [] Other:     Spoke with floor nurse, Monik floor nurse to provide survival packet to patient    Information reviewed:   Definition of diabetes   Target glucose ranges/A1C   Self-monitoring of blood glucose   Prevention/symptoms/treatment of hypo-/hyperglycemia   Medication adherence   The plate method/meal planning guidelines   The benefits of exercise and recommendations   Reducing the risk of chronic complications        Comment: Pt states he has been diagnosed with diabetes 20 years ago, no previous DM education. Pt has a glucometer, monitors 2 - 3 times daily. Encouraged patient to record blood sugars and take readings to physician appointments. Discussed target blood sugar ranges, meaning and target of A1C. Pt states he has only been on metformin twice daily. Pt did not receive diet instruction by floor dietitian during this hospitalization. Discussed general diet guidelines and food sources of carbohydrate. Discussed plate method. Pt states he eats off of smaller plate. 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 Discussed proper treatment of hypoglycemia, pt states he has not experienced hypoglycemia in the past. Pt has no further questions at this time, encouraged patient to contact diabetes education for outpatient appointment.     Diabetes medications reviewed (use, purpose, action, side effects):  Metformin and glipizide    Evaluation/Plan/Recommendations: Patient's understanding of diabetes:   []Poor   [x]Fair    []Good   [] Excellent     Outpatient diabetes education is recommended:   [x] Yes  [] No   [] As needed  Patient is interested in outpatient diabetes education:   [] Yes    [] No    [x] Unsure    Recommended:     [] Consult to social work; patient has no insurance or financial hardship      [] Script for glucometer and supplies (per preference of patient's insurance)               [x] Script for outpatient diabetes education classes from PCP    [x] Carbohydrate-controlled diet    [] Patient prefers/educator recommends insulin pens instead of syringes     (if insulin ordered for home use)    Thank you for this consult.

## 2020-12-30 ENCOUNTER — CARE COORDINATION (OUTPATIENT)
Dept: CARE COORDINATION | Age: 70
End: 2020-12-30

## 2020-12-30 NOTE — CARE COORDINATION
Patient contacted regarding AWPHN-30 diagnosis\". Discussed COVID-19 related testing which was available at this time. Test results were positive. Patient informed of results, if available? Yes    Care Transition Nurse/ Ambulatory Care Manager contacted the patient by telephone to perform post discharge assessment. Call within 2 business days of discharge: Yes. Verified name and  with patient as identifiers. Provided introduction to self, and explanation of the CTN/ACM role, and reason for call due to risk factors for infection and/or exposure to COVID-19. Symptoms reviewed with patient who verbalized the following symptoms: cough, no new symptoms and no worsening symptoms. Due to no new or worsening symptoms encounter was not routed to provider for escalation. Discussed follow-up appointments. If no appointment was previously scheduled, appointment scheduling offered: Yes  [facility] follow up appointment(s):   Future Appointments   Date Time Provider [place] To   2021 11:00 AM DO FLAKITA Garcia [facility]     Non-[facility] follow up appointment(s):     Non-face-to-face services provided:  Obtained and reviewed discharge summary and/or continuity of care documents     Advance Care Planning:   Does patient have an Advance Directive:  reviewed and current. Patient has following risk factors of: pneumonia and diabetes. CTN/ACM reviewed discharge instructions, medical action plan and red flags such as increased shortness of breath, increasing fever and signs of decompensation with patient who verbalized understanding. Discussed exposure protocols and quarantine with CDC Guidelines What to do if you are sick with coronavirus disease .  Patient was given an opportunity for questions and concerns.  The patient agrees to contact the [facility] exposure line [phone], [facility]: ([phone]) and PCP office for questions related to their

## 2021-01-04 ENCOUNTER — VIRTUAL VISIT (OUTPATIENT)
Dept: FAMILY MEDICINE CLINIC | Age: 71
End: 2021-01-04
Payer: COMMERCIAL

## 2021-01-04 DIAGNOSIS — E78.5 DYSLIPIDEMIA: ICD-10-CM

## 2021-01-04 DIAGNOSIS — E11.9 TYPE 2 DIABETES MELLITUS WITHOUT COMPLICATION, WITHOUT LONG-TERM CURRENT USE OF INSULIN (HCC): ICD-10-CM

## 2021-01-04 DIAGNOSIS — U07.1 PNEUMONIA DUE TO COVID-19 VIRUS: Primary | ICD-10-CM

## 2021-01-04 DIAGNOSIS — I10 ESSENTIAL HYPERTENSION: ICD-10-CM

## 2021-01-04 DIAGNOSIS — I26.99 ACUTE PULMONARY EMBOLISM, UNSPECIFIED PULMONARY EMBOLISM TYPE, UNSPECIFIED WHETHER ACUTE COR PULMONALE PRESENT (HCC): ICD-10-CM

## 2021-01-04 DIAGNOSIS — Z12.5 SCREENING PSA (PROSTATE SPECIFIC ANTIGEN): ICD-10-CM

## 2021-01-04 DIAGNOSIS — K62.5 RECTAL BLEEDING: ICD-10-CM

## 2021-01-04 DIAGNOSIS — J12.82 PNEUMONIA DUE TO COVID-19 VIRUS: Primary | ICD-10-CM

## 2021-01-04 DIAGNOSIS — K64.0 GRADE I HEMORRHOIDS: ICD-10-CM

## 2021-01-04 DIAGNOSIS — R53.83 FATIGUE, UNSPECIFIED TYPE: ICD-10-CM

## 2021-01-04 PROCEDURE — 99496 TRANSJ CARE MGMT HIGH F2F 7D: CPT | Performed by: FAMILY MEDICINE

## 2021-01-04 PROCEDURE — 1111F DSCHRG MED/CURRENT MED MERGE: CPT | Performed by: FAMILY MEDICINE

## 2021-01-04 RX ORDER — CHOLECALCIFEROL (VITAMIN D3) 25 MCG
1000 TABLET ORAL DAILY
Qty: 90 TABLET | Refills: 1 | Status: SHIPPED
Start: 2021-01-04 | End: 2021-03-03 | Stop reason: SDUPTHER

## 2021-01-04 RX ORDER — GLIPIZIDE 5 MG/1
5 TABLET ORAL DAILY
Qty: 90 TABLET | Refills: 1 | Status: SHIPPED
Start: 2021-01-04 | End: 2021-03-19 | Stop reason: SDUPTHER

## 2021-01-04 RX ORDER — HYDROCORTISONE 25 MG/G
CREAM TOPICAL
Qty: 28 G | Refills: 3 | Status: SHIPPED | OUTPATIENT
Start: 2021-01-04

## 2021-01-04 SDOH — ECONOMIC STABILITY: TRANSPORTATION INSECURITY
IN THE PAST 12 MONTHS, HAS THE LACK OF TRANSPORTATION KEPT YOU FROM MEDICAL APPOINTMENTS OR FROM GETTING MEDICATIONS?: NO

## 2021-01-04 SDOH — ECONOMIC STABILITY: FOOD INSECURITY: WITHIN THE PAST 12 MONTHS, THE FOOD YOU BOUGHT JUST DIDN'T LAST AND YOU DIDN'T HAVE MONEY TO GET MORE.: NEVER TRUE

## 2021-01-04 SDOH — ECONOMIC STABILITY: FOOD INSECURITY: WITHIN THE PAST 12 MONTHS, YOU WORRIED THAT YOUR FOOD WOULD RUN OUT BEFORE YOU GOT MONEY TO BUY MORE.: NEVER TRUE

## 2021-01-04 ASSESSMENT — PATIENT HEALTH QUESTIONNAIRE - PHQ9
SUM OF ALL RESPONSES TO PHQ QUESTIONS 1-9: 0
1. LITTLE INTEREST OR PLEASURE IN DOING THINGS: 0
SUM OF ALL RESPONSES TO PHQ9 QUESTIONS 1 & 2: 0

## 2021-01-04 NOTE — PROGRESS NOTES
Post-Discharge Transitional Care Management Services or Hospital Follow Up      Mari Essex   YOB: 1950    Date of Office Visit:  1/4/2021  Date of Hospital Admission: 12/21/20  Date of Hospital Discharge: 12/28/20  Risk of hospital readmission (high >=14%.  Medium >=10%) :Readmission Risk Score: 15      Care management risk score Rising risk (score 2-5) and Complex Care (Scores >=6): 1     Non face to face  following discharge, date last encounter closed (first attempt may have been earlier): 12/30/2020  2:09 PM    Call initiated 2 business days of discharge: Yes    Patient Active Problem List   Diagnosis    Pneumonia due to COVID-19 virus    Hemorrhoid    Type 2 diabetes mellitus without complication, without long-term current use of insulin ([facility] [address]    Essential hypertension       No Known Allergies    Medications listed as ordered at the time of discharge from [facility] Medication Instructions JOSE JUAN:    Printed on:01/04/21 5459   Medication Information                      apixaban (ELIQUIS) 5 MG TABS tablet  Take 1 tablet by mouth 2 times daily             Cholecalciferol (VITAMIN D3) 25 MCG TABS  Take 1 tablet by mouth daily             glipiZIDE (GLUCOTROL) 5 MG tablet  Take 1 tablet by mouth daily             hydrocortisone (ANUSOL-HC) 2.5 % CREA rectal cream  Apply bid             metFORMIN (GLUCOPHAGE) 500 MG tablet  Take 1 tablet by mouth daily (with breakfast)                   Medications marked \"taking\" at this time  Outpatient Medications Marked as Taking for the 1/4/21 encounter (Virtual Visit) with Marah Snyder,    Medication Sig Dispense Refill    hydrocortisone (ANUSOL-HC) 2.5 % CREA rectal cream Apply bid 28 g 3    apixaban (ELIQUIS) 5 MG TABS tablet Take 1 tablet by mouth 2 times daily 180 tablet 1    glipiZIDE (GLUCOTROL) 5 MG tablet Take 1 tablet by mouth daily 90 tablet 1    metFORMIN (GLUCOPHAGE) 500 MG tablet Take 1 tablet by mouth daily (with breakfast) 90 tablet 1    Cholecalciferol (VITAMIN D3) 25 MCG TABS Take 1 tablet by mouth daily 90 tablet 1        Medications patient taking as of now reconciled against medications ordered at time of hospital discharge: Yes    Chief Complaint   Patient presents with   Kaylan Mary New Doctor     Pt calling to establish care with new PCP    Follow-Up from Hospital     Pt was admitted 12/21/2020 - 12/28/2020 for COVID, pneumonia, and DM    Other     Pt consents to the telemedicine visit and [place]       History of Present illness - Follow up of Hospital diagnosis(es): Covid pneumonia, pulmonary embolism     Inpatient course: Discharge summary reviewed- see chart. Interval history/Current status: he is feeling much better and doing well    A comprehensive review of systems was negative except for what was noted in the HPI. There were no vitals filed for this visit. There is no height or weight on file to calculate BMI. Wt Readings from Last 3 Encounters:   12/21/20 215 lb (97.5 kg)   12/18/20 215 lb (97.5 kg)     BP Readings from Last 3 Encounters:   12/28/20 122/69   12/18/20 138/72        Physical Exam:  TeleMedicine Video Visit    This visit was performed as a virtual video visit using a synchronous, two-way, audio-video telehealth technology platform. Patient identification was verified at the start of the visit, including the patient's telephone number and physical location. I discussed with the patient the nature of our telehealth visits, that:     1. Due to the nature of an audio- video modality, the only components of a physical exam that could be done are the elements supported by direct observation. 2. I would evaluate the patient and recommend diagnostics and treatments based on my assessment. 3. If it was felt that the patient should be evaluated in clinic or an emergency room setting, then they would be directed there.   4. Our sessions are not being recorded and that personal health information is protected. 5. Our team would provide follow up care in person if/when the patient needs it. Patient does agree to proceed with telemedicine consultation. Patient's location: home address in [facility]  Physician  location home address in [facility] other people involved in call, are none      Time spent: Greater than 35    This visit was completed virtually using D[employer]. me            Assessment/Plan:  1. Essential hypertension  --patient is instructed on low to moderate sodium ( 2 to 2.5 grams ), daily    Also to increase potassium in the diet to about 3.5 grams daily    Literature is provided     - Comprehensive Metabolic Panel; Future  - CBC Auto Differential; Future    2. Pneumonia due to COVID-19 virus    - Comprehensive Metabolic Panel; Future  - CBC Auto Differential; Future    3. Type 2 diabetes mellitus without complication, without long-term current use of insulin (HCC)    ---VASCULAR PANEL  A) asa, plavix, aggrenox  B) ELIQUIS, pletal, tzd, statin  C) ace, hctz, folic, ccb  D) cannikinumab, fish oils     ---CARDIAC---ELIQUIS, ace, beta, statin, hctz, ( ccb )    - glipiZIDE (GLUCOTROL) 5 MG tablet; Take 1 tablet by mouth daily  Dispense: 90 tablet; Refill: 1  - metFORMIN (GLUCOPHAGE) 500 MG tablet; Take 1 tablet by mouth daily (with breakfast)  Dispense: 90 tablet; Refill: 1  - Cholecalciferol (VITAMIN D3) 25 MCG TABS; Take 1 tablet by mouth daily  Dispense: 90 tablet; Refill: 1  - Comprehensive Metabolic Panel; Future  - CBC Auto Differential; Future  - Hemoglobin A1C; Future  - Microalbumin, Ur; Future    4. Grade I hemorrhoids    - hydrocortisone (ANUSOL-HC) 2.5 % CREA rectal cream; Apply bid  Dispense: 28 g; Refill: 3  - Comprehensive Metabolic Panel; Future  - CBC Auto Differential; Future  - Anita Martines [facility], [facility]    5. Dyslipidemia    - Comprehensive Metabolic Panel; Future  - Lipid Panel; Future  - CBC Auto Differential; Future    6. Fatigue, unspecified type    - TSH without Reflex; Future  - Uric Acid; Future  - Comprehensive Metabolic Panel; Future  - CBC Auto Differential; Future    7. Screening PSA (prostate specific antigen)    - Psa screening; Future    8. Acute pulmonary embolism, unspecified pulmonary embolism type, unspecified whether acute cor pulmonale present (HCC)    - apixaban (ELIQUIS) 5 MG TABS tablet; Take 1 tablet by mouth 2 times daily  Dispense: 180 tablet; Refill: 1  - Comprehensive Metabolic Panel; Future  - CBC Auto Differential; Future    9.  Rectal bleeding    - Julita Mercado MD, General Surgery, [facility] Decision Making: high complexity

## 2021-01-05 ENCOUNTER — TELEPHONE (OUTPATIENT)
Dept: SURGERY | Age: 71
End: 2021-01-05

## 2021-01-05 NOTE — TELEPHONE ENCOUNTER
MA contacted patient to schedule appointment for hemorrhoids based on referral by Dr Valeria Joshi. Patient scheduled with first available provider on 1/15/2021 @ 9:15am with Dr Mandi Coronado. Patient informed of location and what to bring to appointment. Letter sent to patient in mail.     Electronically signed by Darlene Thompson on 1/5/21 at 2:24 PM EST

## 2021-01-12 DIAGNOSIS — U07.1 PNEUMONIA DUE TO COVID-19 VIRUS: ICD-10-CM

## 2021-01-12 DIAGNOSIS — J12.82 PNEUMONIA DUE TO COVID-19 VIRUS: ICD-10-CM

## 2021-01-12 DIAGNOSIS — E11.9 TYPE 2 DIABETES MELLITUS WITHOUT COMPLICATION, WITHOUT LONG-TERM CURRENT USE OF INSULIN (HCC): Primary | ICD-10-CM

## 2021-01-12 RX ORDER — BLOOD-GLUCOSE METER
1 KIT MISCELLANEOUS DAILY
Qty: 1 KIT | Refills: 0 | Status: SHIPPED | OUTPATIENT
Start: 2021-01-12

## 2021-01-12 RX ORDER — LANCETS 30 GAUGE
1 EACH MISCELLANEOUS DAILY
Qty: 100 EACH | Refills: 3 | Status: SHIPPED
Start: 2021-01-12 | End: 2022-10-10 | Stop reason: SDUPTHER

## 2021-01-12 RX ORDER — GLUCOSAMINE HCL/CHONDROITIN SU 500-400 MG
CAPSULE ORAL
Qty: 100 STRIP | Refills: 3 | Status: SHIPPED
Start: 2021-01-12 | End: 2021-04-29

## 2021-01-12 NOTE — TELEPHONE ENCOUNTER
Patient called the clinical care line requesting medication refill. Chart reviewed and medication sent to the pharmacy.   Electronically signed by Dasha Levine on 1/12/2021 at 11:09 AM

## 2021-01-15 ENCOUNTER — OFFICE VISIT (OUTPATIENT)
Dept: SURGERY | Age: 71
End: 2021-01-15
Payer: COMMERCIAL

## 2021-01-15 VITALS
HEIGHT: 72 IN | HEART RATE: 82 BPM | DIASTOLIC BLOOD PRESSURE: 69 MMHG | WEIGHT: 182 LBS | TEMPERATURE: 97.3 F | SYSTOLIC BLOOD PRESSURE: 119 MMHG | OXYGEN SATURATION: 96 % | BODY MASS INDEX: 24.65 KG/M2 | RESPIRATION RATE: 16 BRPM

## 2021-01-15 DIAGNOSIS — I26.99 ACUTE PULMONARY EMBOLISM WITHOUT ACUTE COR PULMONALE, UNSPECIFIED PULMONARY EMBOLISM TYPE (HCC): ICD-10-CM

## 2021-01-15 DIAGNOSIS — K44.9 HIATAL HERNIA WITHOUT GANGRENE AND OBSTRUCTION: ICD-10-CM

## 2021-01-15 DIAGNOSIS — K64.5 THROMBOSED HEMORRHOIDS: Primary | ICD-10-CM

## 2021-01-15 DIAGNOSIS — Z86.010 HISTORY OF COLON POLYPS: ICD-10-CM

## 2021-01-15 PROCEDURE — 99202 OFFICE O/P NEW SF 15 MIN: CPT | Performed by: SURGERY

## 2021-01-15 PROCEDURE — 99204 OFFICE O/P NEW MOD 45 MIN: CPT | Performed by: SURGERY

## 2021-01-15 NOTE — PROGRESS NOTES
[facility]  HISTORY & PHYSICAL      CC:     Chief Complaint   Patient presents with    Consultation     Hemorrhoids, ref by Dr. Rudolph Castro Hemorrhoids              HPI:     Brad Loo is here as New patient(1/15/2021). The patient was sent here to discuss colorectal cancer screening. The patient denies any weight loss, rectal bleeding, abdominal pain, or change in bowel habits. There is no family history of IBD or colorectal cancer. The patient had a screening colonoscopy 7 years ago and a polyp was removed. Pt  complains of hemorrhoids that have been present for years  The patient reported  acute, constant severe pain localized to the rectum that started 1 month ago. The hemorrhodis were flaring and extremely painful. The intensity of the pain is severe. There are no alleviating or worsening factors regarding the pain. Finally, over past week, the hemorrhoidal pain has improved. The hemorrhoids became big and swollen and hung out. Over the past week, the hemorrhoidal swelling has decreased. Pt was discharged on 12/28 from the hospital for covid pneumonia and new pulmonary embolus. He has been on eliquis. Patient complains of dyspepsia. Symptoms have been present for approximately several years. Symptoms includechoking on food and dysphagia. The patient denies no other symptoms. Symptoms appear to be worsened by fatty foods. Risk factors present for GERD include none. Risk factors absent for GERD are none. Studies performed so far include none. Treatments tried so far include lifestyle change including dietary changes. Results of treatment: considerable improvement in symptom severity. Currently, the symptoms are mildand occur approximately several times per month. No past medical history on file. No past surgical history on file. Social History     Socioeconomic History    Marital status:       Spouse name: Not on file    Number of children: Not on file  Years of education: Not on file    Highest education level: Not on file   Occupational History    Not on file   Social Needs    Financial resource strain: Not on file    Food insecurity     Worry: Never true     Inability: Never true    Transportation needs     Medical: No     Non-medical: No   Tobacco Use    Smoking status: Former Smoker     Packs/day: 1.50     Years: 25.00     Pack years: 37.50     Types: Cigarettes     Start date: 1961     Quit date: 1986     Years since quittin.0    Smokeless tobacco: Never Used   Substance and Sexual Activity    Alcohol use: Not Currently    Drug use: Not Currently    Sexual activity: Not on file   Lifestyle    Physical activity     Days per week: Not on file     Minutes per session: Not on file    Stress: Not on file   Relationships    Social connections     Talks on phone: Not on file     Gets together: Not on file     Attends [employer] service: Not on file     Active member of club or organization: Not on file     Attends meetings of clubs or organizations: Not on file     Relationship status: Not on file    Intimate partner violence     Fear of current or ex partner: Not on file     Emotionally abused: Not on file     Physically abused: Not on file     Forced sexual activity: Not on file   Other Topics Concern    Not on file   Social History Narrative    Not on file     No Known Allergies     I have reviewed and confirmed the past medical history, surgical history, social history, allergies in the chart. Medications: I have reviewed the medication list in the chart.     Review of Systems:  Review of Systems - History obtained from the patient  General ROS: negative  Psychological ROS: negative  Ophthalmic ROS: negative for - blurry vision, double vision or loss of vision  ENT ROS: negative for - epistaxis, sore throat, vocal changes or malocclusion  Respiratory ROS: negative for - pleuritic pain, shortness of breath or tachypnea  Cardiovascular ROS: negative for - chest pain or palpitations  Gastrointestinal ROS: negative for - abdominal pain or gas/bloating  Genito-Urinary ROS: negative for - hematuria or pelvic pain  Endocrine ROS: negative   Heme ROS: negative   Musculoskeletal ROS: negative  Neurological ROS: negative for - confusion, dizziness, headaches, numbness/tingling, seizures or weakness    Physical Exam   /69   Pulse 82   Temp 97.3 °F (36.3 °C) (Infrared)   Resp 16   Ht 6' (1.829 m)   Wt 182 lb (82.6 kg)   SpO2 96%   BMI 24.68 kg/m²   Vitals:    01/15/21 0914   BP: 119/69   Pulse: 82   Resp: 16   Temp: 97.3 °F (36.3 °C)   SpO2: 96%       PSYCH: mood and affect normal, alert and oriented x 3  CONSTITUTIONAL: No apparent distress, comfortable  EYES: Sclera white, pupils equal round and reactive to light  ENMT:  Hearing normal, trachea midline, ears externally intact  LYMPH: no lympadenopathy in neck. No lympadenopathy in groins  RESP: Breath sounds were clear and equal with no rales, wheezes, or rhonchi. Respiratory effort was normal with no retractions or use of accessory muscles. CV: Heart sounds were normal with a regular rate and rhythm. No pedal edema  GI/ Abdomen: The abdomen was soft and non distended. There was no tenderness, guarding, rebound, or rigidity. There was no                     masses, hepatosplenomegaly, or hernias. Rectal -Right lateral/ posterior hemorrhoid thrombosed but shrinking in size. MSK: no clubbing/ no cyanosis/ gait normal       Assessment:        Diagnosis Orders   1. Thrombosed hemorrhoids     2. Acute pulmonary embolism without acute cor pulmonale, unspecified pulmonary embolism type ([address]     3. History of colon polyps     4. Hiatal hernia without gangrene and obstruction             Plan:  1. Thrombosed hemorrhoids Resolving--less swollen and tender. Improving clinically.    Follow up in 6-8 weeks to determine whether pt will need surgical excision    2. Recent dx of covid-pneumonia with acute pulmonary embolus dx end of December  Continue eliquis for now  Will hold on any procedures for now secondary to new PE dx end of December. Ideally he should be anticoagulated for 3-6 months prior to holding any anticoagulation    2. Hx of colonoscopy . Last c scope was 7 years ago with polypectomy  Will need high risk screening colonoscopy at some point this year    4. Hiatal hernia--symptoms improved with dietary/ lifestyle changes  Will need egd later this year    Follow up in 6-8weeks to determine status of hemorrhoids. Will plan on egd/ colonoscopy +-hemorrhoidectomy in April/ May due to covid -19 and acute PE        On this date 01/15/21 I have spent 45 minutes reviewing previous notes, test results and face to face with the patient discussing the diagnosis and importance of compliance with the treatment plan as well as documenting on the day of the visit.     Stanley Hall MD, FACS  1/15/2021  9:27 AM

## 2021-01-19 DIAGNOSIS — I26.99 ACUTE PULMONARY EMBOLISM, UNSPECIFIED PULMONARY EMBOLISM TYPE, UNSPECIFIED WHETHER ACUTE COR PULMONALE PRESENT (HCC): ICD-10-CM

## 2021-01-19 NOTE — TELEPHONE ENCOUNTER
Patient called the clinical care line requesting medication refill. Chart reviewed and medication sent to the pharmacy.   Electronically signed by Nighat Holland on 1/19/2021 at 1:57 PM

## 2021-02-02 ENCOUNTER — HOSPITAL ENCOUNTER (OUTPATIENT)
Dept: DIABETES SERVICES | Age: 71
Setting detail: THERAPIES SERIES
Discharge: HOME | End: 2021-02-02
Payer: MEDICARE

## 2021-02-02 PROCEDURE — G0108 DIAB MANAGE TRN  PER INDIV: HCPCS

## 2021-02-02 SDOH — ECONOMIC STABILITY: FOOD INSECURITY: ADDITIONAL INFORMATION: NO

## 2021-02-02 ASSESSMENT — PROBLEM AREAS IN DIABETES QUESTIONNAIRE (PAID)
FEELING SCARED WHEN YOU THINK ABOUT LIVING WITH DIABETES: 0
FEELING THAT DIABETES IS TAKING UP TOO MUCH OF YOUR MENTAL AND PHYSICAL ENERGY EVERY DAY: 0
FEELING DEPRESSED WHEN YOU THINK ABOUT LIVING WITH DIABETES: 0

## 2021-02-02 NOTE — PROGRESS NOTES
Diabetes Self-Management Education Record    Participant Name: Bennie Scott  Referring Provider: Yoselyn Chacon DO  Assessment/Evaluation Ratings:  1=Needs Instruction   4=Demonstrates Understanding/Competency  2=Needs Review   NC=Not Covered    3=Comprehends Key Points  N/A=Not Applicable  Topics/Learning Objectives Pre-session Assess Date:  Instructor initials/date  2/2/21 CS Instr. Date    Instructor initials/date  2/2/21 CS Follow-up Post- session Eval Comments   Diabetes disease process & Treatment process:   -Define type of diabetes in simple terms.  - Describe the ABCs of  diabetes management  -Identify own type of diabetes  -Identify lifestyle changes/treatment options  -other:  2 [x] All     []  []  []  []  []  4 Type 2  Pt states dx before 2000  Pt states recently had COVID- states that he lost weight (currently 183#, usually weighs 192-197#) and that blood glucose numbers were higher   Developing strategies for Healthy coping/psychosocial issues:    -Describe feelings about living with diabetes  -Identify coping strategies and sources of stress  -Identify support needed & support network available  -Complete PAID-5 Diabetes questionnaire 2 [x] All     []  []  []    []  4 Bennie Scott completed a Diabetes Self- Management Education Assessment on 2/2/21. Part of our assessment is having the patient complete the PAID (Problem Areas in Diabetes Scale)-5 survey. This tool  measures diabetes-related emotional distress a patient may be feeling. Bennie Scott scored _0_   A total score of >8 indicates possible diabetes related emotional distress, which warrants further assessment and a referral to mental health professional for psychological support and treatment.            Prevention, detection & treatment of Chronic complications:    -Identify the prevention, detection and treatment for complications including immunizations, preventive eye, foot, dental and renal exams as indicated per the participant's duration of diabetes and health status.  -Define the natural course of diabetes and the relationship of blood glucose levels to long term complications of diabetes. 2 [x] All     []            []   4   Prevention, detection & treatment of acute complications:     -State the causes,signs & symptoms of hyper & hypoglycemia, and prevention & treatment strategies.   -Describe sick day guidelines  DKA /indications for ketone testing &  when to call physician  2 [x] All     []      []    4     2/2/21 CS Pt states no episode of hypoglycemia.         -Identify severe weather/situation crisis  & diabetes supplies management  []      Using medications safely:   -State effects of diabetes medicines on blood glucose levels;  -List diabetes medication taken, action & side effects 2 [x] All     []  []  4 2/2/21 CS   Metformin BID  Glipizide 5 mg daily   Insulin/Injectables/glucagon  -Name appropriate injection sites; proper storage; supplies needed;     []       Demonstrate proper technique  []      Monitoring blood glucose, interpreting and using results:   -Identify the purpose of testing   -Identify recommended & personal blood glucose targets & HgbA1C target levels  -State the Importance of logging blood glucose levels for pattern recognition;   -State benefits of reading/using pt generated health data  -Verbalize safe lancet disposal 2 [x] All     []  []    []  []  []  4 Pt states that he tests every morning before breakfast.  A1C 8.7% 12/27/2020   -Demonstrate proper testing technique  []      Incorporating physical activity into lifestyle:   -State effect of exercise on blood glucose levels;   -State benefits of regular exercise;   -Define safety considerations/food choices if needed.  -Describe contraindications/maintenance of activity. 2 [x] All     []  []    []  []  4 Pt states that he is usually physically active with walking 6 days/week.    However, Pt states that he is currently recovering from Covid and is working to get his strength back. Incorporating nutritional management into lifestyle:   -Describe effect of type, amount & timing of food on blood glucose  -Describe methods for preparing and planning healthy meals  -Correctly read food labels  -Name 3 foods high in Carbohydrate 2 [x] All       []    []    []  []  4 Pt is able to name carbohydrate foods and serving sizes. 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 Pt's food recall  reveals that he does follow Diabetic Plate method for most meals.    -Plan a carbohydrate-controlled meal based on individualized meal plan  -Demonstrate CHO counting/portion control  2 []  []  4 Instructed patient on Diabetic Plate method with the guidelines of 4 carbohydrate choices at each meal and 2 carbohydrate choice for snack. Developing strategies for problem solving to promote health/change behavior. -Identify 7 self-care behaviors; Personal health risk factors; Benefits, challenges & strategies for behavioral change and set an individualized goal selection. 2       []  4 2/2/21 CS  []Nutrition  []Monitoring   []Exercise  []Medication  [x]Other wants to improve A1C     Identified Barriers to learning/adherence to self management plan:    None  []  other    Instruction Method:  Lecture/Discussion and Handouts    Supporting Education Materials/Equipment Provided: Educational Binder, Meal Plan and Nutritional Packet   [][place] materials       [] services     []Other:      Encounter Type Date Attended Start Time End Time Comments No Show Dates   Assessment 2/2/21  1000   telehealth    Session 1         Session 2        1:1 DSMES  2/2/21 CS 1000 1025  telehealth    In person Follow-up         Gestational Diabetes         Individual MNT        Meter Instrx        Insulin Instrx           Additional Comments: [x] Pt seen individually due to Covid-19 Safety precautions and no group session available.         Date: Follow-up goal attainment based on patients initial DSMES goal    Dr Notified by [] EMR []Fax        []Post class Hgb A1C  []Medication compliance   []Plate method/meal plan compliance   []Able to state the number of Carbohydrate servings eaten at B,L,D   []Testing blood glucose as prescribed by PCP   []Exercise Routine   []Other:   []Other:     []Patient lost to follow-up  Dr Notified by []EMR []Fax     Personal Support Plan:      [x] Keep all scheduled doctor appointments   [] Make and keep appointments with specialists (foot, eye, dentist) as recommended   [] Consult my pharmacist about all new medications or to ask any medication questions   [] Get tested for sleep apnea   [] Seek help for:   [] Make an appointment with:   [] Attend smoking cessation classes or call 1-800-QUIT-NOW  [] Attend Diabetes Support Group   [] Use diabetes magazines, books, or credible web-sites like the ADA for more information  [] Increase exercise at home or join an exercise program:   [] Other:

## 2021-02-19 ENCOUNTER — OFFICE VISIT (OUTPATIENT)
Dept: FAMILY MEDICINE CLINIC | Age: 71
End: 2021-02-19
Payer: MEDICARE

## 2021-02-19 ENCOUNTER — HOSPITAL ENCOUNTER (OUTPATIENT)
Dept: GENERAL RADIOLOGY | Age: 71
Discharge: HOME | End: 2021-02-21
Payer: MEDICARE

## 2021-02-19 ENCOUNTER — HOSPITAL ENCOUNTER (OUTPATIENT)
Age: 71
Discharge: HOME | End: 2021-02-21
Payer: MEDICARE

## 2021-02-19 VITALS
SYSTOLIC BLOOD PRESSURE: 118 MMHG | TEMPERATURE: 97.6 F | WEIGHT: 197 LBS | OXYGEN SATURATION: 96 % | DIASTOLIC BLOOD PRESSURE: 64 MMHG | RESPIRATION RATE: 18 BRPM | HEIGHT: 72 IN | BODY MASS INDEX: 26.68 KG/M2 | HEART RATE: 66 BPM

## 2021-02-19 DIAGNOSIS — I10 ESSENTIAL HYPERTENSION: ICD-10-CM

## 2021-02-19 DIAGNOSIS — J12.82 PNEUMONIA DUE TO COVID-19 VIRUS: ICD-10-CM

## 2021-02-19 DIAGNOSIS — E78.5 DYSLIPIDEMIA: ICD-10-CM

## 2021-02-19 DIAGNOSIS — I26.99 ACUTE PULMONARY EMBOLISM, UNSPECIFIED PULMONARY EMBOLISM TYPE, UNSPECIFIED WHETHER ACUTE COR PULMONALE PRESENT (HCC): ICD-10-CM

## 2021-02-19 DIAGNOSIS — U07.1 PNEUMONIA DUE TO COVID-19 VIRUS: ICD-10-CM

## 2021-02-19 DIAGNOSIS — K64.0 GRADE I HEMORRHOIDS: ICD-10-CM

## 2021-02-19 DIAGNOSIS — Z12.11 SCREEN FOR COLON CANCER: ICD-10-CM

## 2021-02-19 DIAGNOSIS — E11.9 TYPE 2 DIABETES MELLITUS WITHOUT COMPLICATION, WITHOUT LONG-TERM CURRENT USE OF INSULIN (HCC): ICD-10-CM

## 2021-02-19 DIAGNOSIS — Z00.00 ENCOUNTER FOR MEDICARE ANNUAL WELLNESS EXAM: Primary | ICD-10-CM

## 2021-02-19 DIAGNOSIS — Z00.00 ROUTINE GENERAL MEDICAL EXAMINATION AT A HEALTH CARE FACILITY: ICD-10-CM

## 2021-02-19 DIAGNOSIS — R53.83 FATIGUE, UNSPECIFIED TYPE: ICD-10-CM

## 2021-02-19 DIAGNOSIS — Z12.5 SCREENING PSA (PROSTATE SPECIFIC ANTIGEN): ICD-10-CM

## 2021-02-19 LAB
ALBUMIN SERPL-MCNC: 4.1 G/DL (ref 3.5–5.2)
ALP BLD-CCNC: 74 U/L (ref 40–129)
ALT SERPL-CCNC: 13 U/L (ref 0–40)
ANION GAP SERPL CALCULATED.3IONS-SCNC: 15 MMOL/L (ref 7–16)
AST SERPL-CCNC: 14 U/L (ref 0–39)
BILIRUB SERPL-MCNC: 0.3 MG/DL (ref 0–1.2)
BUN BLDV-MCNC: 13 MG/DL (ref 8–23)
CALCIUM SERPL-MCNC: 9.6 MG/DL (ref 8.6–10.2)
CHLORIDE BLD-SCNC: 108 MMOL/L (ref 98–107)
CHOLESTEROL, TOTAL: 189 MG/DL (ref 0–199)
CO2: 21 MMOL/L (ref 22–29)
CREAT SERPL-MCNC: 1 MG/DL (ref 0.7–1.2)
CREATININE URINE POCT: 300
GFR AFRICAN AMERICAN: >60
GFR NON-AFRICAN AMERICAN: >60 ML/MIN/1.73
GLUCOSE BLD-MCNC: 188 MG/DL (ref 74–99)
HBA1C MFR BLD: 8.3 % (ref 4–5.6)
HDLC SERPL-MCNC: 51 MG/DL
LDL CHOLESTEROL CALCULATED: 113 MG/DL (ref 0–99)
MICROALBUMIN/CREAT 24H UR: 30 MG/G{CREAT}
MICROALBUMIN/CREAT UR-RTO: <30
POTASSIUM SERPL-SCNC: 4.5 MMOL/L (ref 3.5–5)
PROSTATE SPECIFIC ANTIGEN: 5.74 NG/ML (ref 0–4)
SODIUM BLD-SCNC: 144 MMOL/L (ref 132–146)
TOTAL PROTEIN: 7 G/DL (ref 6.4–8.3)
TRIGL SERPL-MCNC: 126 MG/DL (ref 0–149)
TSH SERPL DL<=0.05 MIU/L-ACNC: 3.67 UIU/ML (ref 0.27–4.2)
URIC ACID, SERUM: 5.4 MG/DL (ref 3.4–7)
VLDLC SERPL CALC-MCNC: 25 MG/DL

## 2021-02-19 PROCEDURE — 1123F ACP DISCUSS/DSCN MKR DOCD: CPT | Performed by: FAMILY MEDICINE

## 2021-02-19 PROCEDURE — 3046F HEMOGLOBIN A1C LEVEL >9.0%: CPT | Performed by: FAMILY MEDICINE

## 2021-02-19 PROCEDURE — 3017F COLORECTAL CA SCREEN DOC REV: CPT | Performed by: FAMILY MEDICINE

## 2021-02-19 PROCEDURE — G8484 FLU IMMUNIZE NO ADMIN: HCPCS | Performed by: FAMILY MEDICINE

## 2021-02-19 PROCEDURE — G0438 PPPS, INITIAL VISIT: HCPCS | Performed by: FAMILY MEDICINE

## 2021-02-19 PROCEDURE — 4040F PNEUMOC VAC/ADMIN/RCVD: CPT | Performed by: FAMILY MEDICINE

## 2021-02-19 PROCEDURE — 82044 UR ALBUMIN SEMIQUANTITATIVE: CPT | Performed by: FAMILY MEDICINE

## 2021-02-19 PROCEDURE — 71046 X-RAY EXAM CHEST 2 VIEWS: CPT

## 2021-02-19 ASSESSMENT — LIFESTYLE VARIABLES: HOW OFTEN DO YOU HAVE A DRINK CONTAINING ALCOHOL: 0

## 2021-02-19 ASSESSMENT — PATIENT HEALTH QUESTIONNAIRE - PHQ9
2. FEELING DOWN, DEPRESSED OR HOPELESS: 0
SUM OF ALL RESPONSES TO PHQ QUESTIONS 1-9: 0
SUM OF ALL RESPONSES TO PHQ9 QUESTIONS 1 & 2: 0
SUM OF ALL RESPONSES TO PHQ QUESTIONS 1-9: 0
1. LITTLE INTEREST OR PLEASURE IN DOING THINGS: 0

## 2021-02-19 NOTE — PATIENT INSTRUCTIONS
pressure checked during a routine doctor visit. Your doctor will tell you how often to check your blood pressure based on your age, your blood pressure results, and other factors. · Diabetes. Ask your doctor whether you should have tests for diabetes. · Vision. Experts recommend that you have yearly exams for glaucoma and other age-related eye problems. · Hearing. Tell your doctor if you notice any change in your hearing. You can have tests to find out how well you hear. · Colon cancer tests. Keep having colon cancer tests as your doctor recommends. You can have one of several types of tests. · Heart attack and stroke risk. At least every 4 to 6 years, you should have your risk for heart attack and stroke assessed. Your doctor uses factors such as your age, blood pressure, cholesterol, and whether you smoke or have diabetes to show what your risk for a heart attack or stroke is over the next 10 years. · Osteoporosis. Talk to your doctor about whether you should have a bone density test to find out whether you have thinning bones. Ask your doctor if you need to take a calcium plus vitamin D supplement. You may be able to get enough calcium and vitamin D through your diet. For women  · Pap test and pelvic exam. You may no longer need a Pap test. Talk with your doctor about whether to stop or continue to have Pap tests. · Breast exam and mammogram. Ask how often you should have a mammogram, which is an X-ray of your breasts. A mammogram can spot breast cancer before it can be felt and when it is easiest to treat. · Thyroid disease. Talk to your doctor about whether to have your thyroid checked as part of a regular physical exam. Women have an increased chance of a thyroid problem. For men  · Prostate exam. Talk to your doctor about whether you should have a blood test (called a PSA test) for prostate cancer.  Experts recommend that you discuss the benefits and risks of the test with your doctor before you decide whether to have this test. Some experts say that men ages 79 and older no longer need testing. · Abdominal aortic aneurysm. Ask your doctor whether you should have a test to check for an aneurysm. You may need a test if you ever smoked or if your parent, brother, sister, or child has had an aneurysm. When should you call for help? Watch closely for changes in your health, and be sure to contact your doctor if you have any problems or symptoms that concern you. Where can you learn more? Go to https://Similarity [email or web address].PhotoRocket. org and sign in to your Plovgh account. Enter E633 in the Next Generation Dance box to learn more about \"Well Visit, Over 65: Care Instructions. \"     If you do not have an account, please click on the \"Sign Up Now\" link. Current as of: May 27, 2020               Content Version: 12.6  © 6767-3888 [employer]. Care instructions adapted under license by [employer]. If you have questions about a medical condition or this instruction, always ask your healthcare professional. [place] [address] any warranty or liability for your use of this information. Personalized Preventive Plan for Luis Enrique Estradamsted - 2/19/2021  Medicare offers a range of preventive health benefits. Some of the tests and screenings are paid in full while other may be subject to a deductible, co-insurance, and/or copay. Some of these benefits include a comprehensive review of your medical history including lifestyle, illnesses that may run in your family, and various assessments and screenings as appropriate. After reviewing your medical record and screening and assessments performed today your provider may have ordered immunizations, labs, imaging, and/or referrals for you. A list of these orders (if applicable) as well as your Preventive Care list are included within your After Visit Summary for your review.     Other Preventive Recommendations:    · A preventive eye

## 2021-02-19 NOTE — PROGRESS NOTES
Medicare Annual Wellness Visit  Name: Nelson Polanco Date: 2021   MRN: <[id number]> Sex: Male   Age: 66 y.o. Ethnicity: Non-/Non    : 1950 Race: Other      Isacc Nash is here for Medicare AWV, Follow-Up from Hospital (covid, respiratory failure - 2020 - patient states symptoms are completely resolved.), and Health Maintenance (Patient is due for colonoscopy - he follows with general surgery and has upcoming appointment. )    Screenings for behavioral, psychosocial and functional/safety risks, and cognitive dysfunction are all negative except as indicated below. These results, as well as other patient data from the [address] form, are documented in Flowsheets linked to this Encounter. No Known Allergies      Prior to Visit Medications    Medication Sig Taking? Authorizing Provider   apixaban (ELIQUIS) 5 MG TABS tablet Take 1 tablet by mouth 2 times daily Yes Sunday Gusman DO   glucose monitoring kit (FREESTYLE) monitoring kit 1 kit by Does not apply route daily Yes JOSE ANGEL Gusman DO   blood glucose monitor strips Test 2 times a day & as needed for symptoms of irregular blood glucose. Yes SYSCO Naseem, DO   Lancets MISC 1 each by Does not apply route daily Yes SYSCO Naseem DO   hydrocortisone (ANUSOL-HC) 2.5 % CREA rectal cream Apply bid Yes Sunday Gusman DO   glipiZIDE (GLUCOTROL) 5 MG tablet Take 1 tablet by mouth daily Yes Sunday Gusman DO   metFORMIN (GLUCOPHAGE) 500 MG tablet Take 1 tablet by mouth daily (with breakfast) Yes Sunday Gusman DO   Cholecalciferol (VITAMIN D3) 25 MCG TABS Take 1 tablet by mouth daily Yes lifecake, DO       History reviewed. No pertinent past medical history. History reviewed. No pertinent surgical history. History reviewed. No pertinent family history.     CareTeam (Including outside providers/suppliers regularly involved in providing care):   Patient Care Team:  Phylicia Sen DO as PCP - General (Family Medicine)  Phylicia Sen DO [facility]    Wt Readings from Last 3 Encounters:   02/19/21 197 lb (89.4 kg)   01/15/21 182 lb (82.6 kg)   12/21/20 215 lb (97.5 kg)     Vitals:    02/19/21 0958   BP: 118/64   Pulse: 66   Resp: 18   Temp: 97.6 °F (36.4 °C)   SpO2: 96%   Weight: 197 lb (89.4 kg)   Height: 6' (1.829 m)     Body mass index is 26.72 kg/m². Based upon direct observation of the patient, evaluation of cognition reveals recent and remote memory intact. General Appearance: alert and oriented to person, place and time, well-developed and well-nourished, in no acute distress  Skin: warm and dry, no rash or erythema  Head: normocephalic and atraumatic  Eyes: pupils equal, round, and reactive to light, extraocular eye movements intact, conjunctivae normal  ENT: tympanic membrane, external ear and ear canal normal bilaterally, oropharynx clear and moist with normal mucous membranes  Neck: neck supple and non tender without mass, no thyromegaly or thyroid nodules, no cervical lymphadenopathy   Pulmonary/Chest: clear to auscultation bilaterally- no wheezes, rales or rhonchi, normal air movement, no respiratory distress  Cardiovascular: normal rate, regular rhythm, normal S1 and S2, no murmurs, no gallops, intact distal pulses and no carotid bruits  Abdomen: soft, non-tender, non-distended, normal bowel sounds, no masses or organomegaly  Genitourinary: genitals normal without hernia or inguinal adenopathy  Extremities: no cyanosis and no clubbing  Musculoskeletal: normal range of motion, no joint swelling, deformity or tenderness  Neurologic: gait and coordination normal and speech normal    Patient's complete Health Risk Assessment and screening values have been reviewed and are found in Flowsheets.  The following problems were reviewed today and where indicated follow up appointments were made and/or referrals COVID-19 Vaccine (1 of 2) 02/04/1965    Diabetic microalbuminuria test  02/04/1967    DTaP/Tdap/Td vaccine (1 - Tdap) 02/04/1968    Shingles Vaccine (1 of 2) 02/04/1999    Colon cancer screen colonoscopy  02/04/1999    Pneumococcal 65+ years Vaccine (1 of 1 - PPSV23) 02/04/2014    Flu vaccine (1) 09/01/2020    Annual Wellness Visit (AWV)  01/24/2021    A1C test (Diabetic or Prediabetic)  12/27/2021    Lipid screen  12/27/2021    Potassium monitoring  12/27/2021    Creatinine monitoring  12/27/2021    Hepatitis A vaccine  Aged Out    Hib vaccine  Aged Out    Meningococcal (ACWY) vaccine  Aged Out     Recommendations for MeetMe Due: see orders and patient instructions/AVS.  . Recommended screening schedule for the next 5-10 years is provided to the patient in written form: see Patient Instructions/AVS.    Marcie Oseguera was seen today for medicare awv, follow-up from hospital and health maintenance. Diagnoses and all orders for this visit:    Encounter for Medicare annual wellness exam    ---VASCULAR PANEL  A) asa, plavix, aggrenox  B) ELIQUIS, pletal, tzd, statin  C) ace, hctz, folic, ccb  D) cannikinumab, fish oils     ---CARDIAC---ELIQUIS, ace, beta, statin, hctz, ( ccb )    Type 2 diabetes mellitus without complication, without long-term current use of insulin ([facility] [address]  -     POCT Microalbumin    Screen for colon cancer  -     Cologuard (For External Results Only); Future    Pneumonia due to COVID-19 virus  -     XR CHEST (2 VW);  Future

## 2021-02-20 LAB
ANISOCYTOSIS: ABNORMAL
BASOPHILS ABSOLUTE: 0.08 E9/L (ref 0–0.2)
BASOPHILS RELATIVE PERCENT: 0.9 % (ref 0–2)
BURR CELLS: ABNORMAL
EOSINOPHILS ABSOLUTE: 0.2 E9/L (ref 0.05–0.5)
EOSINOPHILS RELATIVE PERCENT: 2.4 % (ref 0–6)
HCT VFR BLD CALC: 36.9 % (ref 37–54)
HEMOGLOBIN: 11.2 G/DL (ref 12.5–16.5)
IMMATURE GRANULOCYTES #: 0.03 E9/L
IMMATURE GRANULOCYTES %: 0.4 % (ref 0–5)
LYMPHOCYTES ABSOLUTE: 2.97 E9/L (ref 1.5–4)
LYMPHOCYTES RELATIVE PERCENT: 35 % (ref 20–42)
MCH RBC QN AUTO: 24.1 PG (ref 26–35)
MCHC RBC AUTO-ENTMCNC: 30.4 % (ref 32–34.5)
MCV RBC AUTO: 79.4 FL (ref 80–99.9)
MONOCYTES ABSOLUTE: 0.81 E9/L (ref 0.1–0.95)
MONOCYTES RELATIVE PERCENT: 9.5 % (ref 2–12)
NEUTROPHILS ABSOLUTE: 4.4 E9/L (ref 1.8–7.3)
NEUTROPHILS RELATIVE PERCENT: 51.8 % (ref 43–80)
OVALOCYTES: ABNORMAL
PDW BLD-RTO: 20.3 FL (ref 11.5–15)
PLATELET # BLD: 382 E9/L (ref 130–450)
PMV BLD AUTO: 10.7 FL (ref 7–12)
POIKILOCYTES: ABNORMAL
RBC # BLD: 4.65 E12/L (ref 3.8–5.8)
SCHISTOCYTES: ABNORMAL
WBC # BLD: 8.5 E9/L (ref 4.5–11.5)

## 2021-02-24 DIAGNOSIS — J18.9 PNEUMONIA OF BOTH LUNGS DUE TO INFECTIOUS ORGANISM, UNSPECIFIED PART OF LUNG: Primary | ICD-10-CM

## 2021-02-24 DIAGNOSIS — R97.20 ELEVATED PSA: ICD-10-CM

## 2021-03-01 ENCOUNTER — HOSPITAL ENCOUNTER (OUTPATIENT)
Age: 71
Discharge: HOME | End: 2021-03-03
Payer: MEDICARE

## 2021-03-01 ENCOUNTER — HOSPITAL ENCOUNTER (OUTPATIENT)
Dept: GENERAL RADIOLOGY | Age: 71
Discharge: HOME | End: 2021-03-03
Payer: MEDICARE

## 2021-03-01 DIAGNOSIS — J18.9 PNEUMONIA OF BOTH LUNGS DUE TO INFECTIOUS ORGANISM, UNSPECIFIED PART OF LUNG: ICD-10-CM

## 2021-03-01 PROCEDURE — 71046 X-RAY EXAM CHEST 2 VIEWS: CPT

## 2021-03-03 ENCOUNTER — OFFICE VISIT (OUTPATIENT)
Dept: FAMILY MEDICINE CLINIC | Age: 71
End: 2021-03-03
Payer: MEDICARE

## 2021-03-03 VITALS
HEART RATE: 74 BPM | RESPIRATION RATE: 18 BRPM | BODY MASS INDEX: 26.55 KG/M2 | SYSTOLIC BLOOD PRESSURE: 118 MMHG | DIASTOLIC BLOOD PRESSURE: 70 MMHG | HEIGHT: 72 IN | TEMPERATURE: 97.9 F | WEIGHT: 196 LBS | OXYGEN SATURATION: 99 %

## 2021-03-03 DIAGNOSIS — E11.9 TYPE 2 DIABETES MELLITUS WITHOUT COMPLICATION, WITHOUT LONG-TERM CURRENT USE OF INSULIN (HCC): Primary | ICD-10-CM

## 2021-03-03 DIAGNOSIS — I10 ESSENTIAL HYPERTENSION: ICD-10-CM

## 2021-03-03 DIAGNOSIS — K44.9 HIATAL HERNIA WITHOUT GANGRENE AND OBSTRUCTION: ICD-10-CM

## 2021-03-03 DIAGNOSIS — I26.99 ACUTE PULMONARY EMBOLISM WITHOUT ACUTE COR PULMONALE, UNSPECIFIED PULMONARY EMBOLISM TYPE (HCC): ICD-10-CM

## 2021-03-03 DIAGNOSIS — K64.0 GRADE I HEMORRHOIDS: ICD-10-CM

## 2021-03-03 PROCEDURE — 4040F PNEUMOC VAC/ADMIN/RCVD: CPT | Performed by: FAMILY MEDICINE

## 2021-03-03 PROCEDURE — 1123F ACP DISCUSS/DSCN MKR DOCD: CPT | Performed by: FAMILY MEDICINE

## 2021-03-03 PROCEDURE — 3052F HG A1C>EQUAL 8.0%<EQUAL 9.0%: CPT | Performed by: FAMILY MEDICINE

## 2021-03-03 PROCEDURE — G8417 CALC BMI ABV UP PARAM F/U: HCPCS | Performed by: FAMILY MEDICINE

## 2021-03-03 PROCEDURE — 99214 OFFICE O/P EST MOD 30 MIN: CPT | Performed by: FAMILY MEDICINE

## 2021-03-03 PROCEDURE — G8484 FLU IMMUNIZE NO ADMIN: HCPCS | Performed by: FAMILY MEDICINE

## 2021-03-03 PROCEDURE — 1036F TOBACCO NON-USER: CPT | Performed by: FAMILY MEDICINE

## 2021-03-03 PROCEDURE — G8427 DOCREV CUR MEDS BY ELIG CLIN: HCPCS | Performed by: FAMILY MEDICINE

## 2021-03-03 PROCEDURE — 3017F COLORECTAL CA SCREEN DOC REV: CPT | Performed by: FAMILY MEDICINE

## 2021-03-03 PROCEDURE — 2022F DILAT RTA XM EVC RTNOPTHY: CPT | Performed by: FAMILY MEDICINE

## 2021-03-03 RX ORDER — CHOLECALCIFEROL (VITAMIN D3) 25 MCG
1000 TABLET ORAL DAILY
Qty: 90 TABLET | Refills: 1 | Status: SHIPPED
Start: 2021-03-03 | End: 2021-08-23

## 2021-03-03 ASSESSMENT — ENCOUNTER SYMPTOMS
SHORTNESS OF BREATH: 0
EYE REDNESS: 0
VOICE CHANGE: 0
STRIDOR: 0
EYE ITCHING: 0
ABDOMINAL PAIN: 0
COLOR CHANGE: 0
ANAL BLEEDING: 1
NAUSEA: 0
RESPIRATORY NEGATIVE: 1
ALLERGIC/IMMUNOLOGIC NEGATIVE: 1
SINUS PRESSURE: 0
DIARRHEA: 0
RECTAL PAIN: 0
SINUS PAIN: 0
WHEEZING: 0
CONSTIPATION: 0
FACIAL SWELLING: 0
ABDOMINAL DISTENTION: 0
BLURRED VISION: 0
SORE THROAT: 0
COUGH: 0
EYE PAIN: 0
BLOOD IN STOOL: 0
ORTHOPNEA: 0
RHINORRHEA: 0
VOMITING: 0
EYE DISCHARGE: 0
TROUBLE SWALLOWING: 0
APNEA: 0
PHOTOPHOBIA: 0
CHOKING: 0
CHEST TIGHTNESS: 0
BACK PAIN: 0

## 2021-03-03 NOTE — PROGRESS NOTES
chest pain, palpitations, orthopnea, leg swelling and PND. Gastrointestinal: Positive for anal bleeding. Negative for abdominal distention, abdominal pain, blood in stool, constipation, diarrhea, nausea, rectal pain and vomiting. Endocrine: Negative. Negative for cold intolerance, heat intolerance, polydipsia, polyphagia and polyuria. Genitourinary: Negative. Negative for decreased urine volume, difficulty urinating, discharge, dysuria, enuresis, flank pain, frequency, genital sores, hematuria, penile pain, penile swelling, scrotal swelling, testicular pain and urgency. Musculoskeletal: Negative. Negative for arthralgias, back pain, gait problem, joint swelling, myalgias, neck pain and neck stiffness. Skin: Negative. Negative for color change, pallor, rash and wound. Allergic/Immunologic: Negative. Negative for environmental allergies, food allergies and immunocompromised state. Neurological: Negative. Negative for dizziness, tremors, seizures, syncope, facial asymmetry, speech difficulty, weakness, light-headedness, numbness and headaches. Hematological: Negative. Negative for adenopathy. Does not bruise/bleed easily. Psychiatric/Behavioral: Negative. Negative for agitation, behavioral problems, confusion, decreased concentration, dysphoric mood, hallucinations, self-injury, sleep disturbance and suicidal ideas. The patient is not nervous/anxious and is not hyperactive. Past Medical/Surgical Hx;  Reviewed with patient  History reviewed. No pertinent past medical history. History reviewed. No pertinent surgical history. Past Family Hx:  Reviewed with patient  History reviewed. No pertinent family history.     Social Hx:  Reviewed with patient  Social History     Tobacco Use    Smoking status: Former Smoker     Packs/day: 1.50     Years: 25.00     Pack years: 37.50     Types: Cigarettes     Start date: 1961     Quit date: 1986     Years since quittin.1    Smokeless tobacco: Never Used   Substance Use Topics    Alcohol use: Not Currently       OBJECTIVE  /70   Pulse 74   Temp 97.9 °F (36.6 °C) (Temporal)   Resp 18   Ht 6' (1.829 m)   Wt 196 lb (88.9 kg)   SpO2 99%   BMI 26.58 kg/m²     Problem List:  Harpal Estevez does not have any pertinent problems on file. PHYS EX:  Physical Exam  Vitals signs and nursing note reviewed. Constitutional:       General: He is not in acute distress. Appearance: Normal appearance. He is well-developed. He is not ill-appearing, toxic-appearing or diaphoretic. HENT:      Head: Normocephalic and atraumatic. Right Ear: External ear normal. There is no impacted cerumen. Left Ear: External ear normal. There is no impacted cerumen. Nose: Nose normal. No congestion or rhinorrhea. Mouth/Throat:      Mouth: Mucous membranes are moist.      Pharynx: Oropharynx is clear. No oropharyngeal exudate or posterior oropharyngeal erythema. Eyes:      General: No scleral icterus. Right eye: No discharge. Left eye: No discharge. Extraocular Movements: Extraocular movements intact. Conjunctiva/sclera: Conjunctivae normal.      Pupils: Pupils are equal, round, and reactive to light. Neck:      Musculoskeletal: Normal range of motion and neck supple. No neck rigidity or muscular tenderness. Thyroid: No thyromegaly. Vascular: No carotid bruit. Trachea: No tracheal deviation. Cardiovascular:      Rate and Rhythm: Normal rate and regular rhythm. Pulses: Normal pulses. Heart sounds: Normal heart sounds. No murmur. No friction rub. No gallop. Pulmonary:      Effort: Pulmonary effort is normal. No respiratory distress. Breath sounds: Normal breath sounds. No stridor. No wheezing, rhonchi or rales. Chest:      Chest wall: No tenderness. Abdominal:      General: Bowel sounds are normal. There is no distension. Palpations: Abdomen is soft. There is no mass. Tenderness: There is no abdominal tenderness. There is no right CVA tenderness, left CVA tenderness, guarding or rebound. Hernia: No hernia is present. Genitourinary:     Penis: Normal. No tenderness. Testes: Normal.      Rectum: Normal.      Comments: Bleeding hemorrhoids   Musculoskeletal: Normal range of motion. General: No swelling, tenderness, deformity or signs of injury. Right lower leg: No edema. Left lower leg: No edema. Lymphadenopathy:      Cervical: No cervical adenopathy. Skin:     General: Skin is warm. Coloration: Skin is not jaundiced or pale. Findings: No bruising, erythema, lesion or rash. Neurological:      General: No focal deficit present. Mental Status: He is alert and oriented to person, place, and time. Cranial Nerves: No cranial nerve deficit. Sensory: No sensory deficit. Motor: No weakness or abnormal muscle tone. Coordination: Coordination normal.      Gait: Gait normal.      Deep Tendon Reflexes: Reflexes are normal and symmetric. Reflexes normal.   Psychiatric:         Mood and Affect: Mood normal.         Behavior: Behavior normal.         Thought Content: Thought content normal.         Judgment: Judgment normal.          The ASCVD Risk score (Thurman Boeck., et al., 2013) failed to calculate for the following reasons:    Unable to determine if patient is Non-     ASSESSMENT/PLAN  Dion Frey was seen today for discuss labs. Diagnoses and all orders for this visit:    Type 2 diabetes mellitus without complication, without long-term current use of insulin (HCC)  -     metFORMIN (GLUCOPHAGE) 500 MG tablet;  Take 1 tablet by mouth 2 times daily (with meals)  -     Cholecalciferol (VITAMIN D3) 25 MCG TABS; Take 1 tablet by mouth daily  --PLAN -- to add an SGLT 2  And a STATIN    ---VASCULAR PANEL  A) asa, plavix, aggrenox  B) ELIQUIS, pletal, tzd, statin  C) ace, hctz, folic, ccb  D) cannikinumab, fish oils     ---CARDIAC---ELIQUIS, ace, beta, statin, hctz, ( ccb )    Essential hypertension ---controlled   --patient is instructed on low to moderate sodium ( 2 to 2.5 grams ), daily    Also to increase potassium in the diet to about 3.5 grams daily    Literature is provided       Acute pulmonary embolism without acute cor pulmonale, unspecified pulmonary embolism type (HCC)  --due to Covid     Hiatal hernia without gangrene and obstruction  --stable on current care planning  -- continue treatment as we are meeting goals   Long talk on treatment and prevention  Literature is given       Grade I hemorrhoids  --he is seeing GI tomorrow for this and colonoscopy         Outpatient Encounter Medications as of 3/3/2021   Medication Sig Dispense Refill    metFORMIN (GLUCOPHAGE) 500 MG tablet Take 1 tablet by mouth 2 times daily (with meals) 180 tablet 1    Cholecalciferol (VITAMIN D3) 25 MCG TABS Take 1 tablet by mouth daily 90 tablet 1    apixaban (ELIQUIS) 5 MG TABS tablet Take 1 tablet by mouth 2 times daily 180 tablet 1    glucose monitoring kit (FREESTYLE) monitoring kit 1 kit by Does not apply route daily 1 kit 0    blood glucose monitor strips Test 2 times a day & as needed for symptoms of irregular blood glucose. 100 strip 3    Lancets MISC 1 each by Does not apply route daily 100 each 3    hydrocortisone (ANUSOL-HC) 2.5 % CREA rectal cream Apply bid 28 g 3    glipiZIDE (GLUCOTROL) 5 MG tablet Take 1 tablet by mouth daily 90 tablet 1    [DISCONTINUED] metFORMIN (GLUCOPHAGE) 500 MG tablet Take 1 tablet by mouth daily (with breakfast) (Patient taking differently: Take 500 mg by mouth 2 times daily (with meals) ) 90 tablet 1    [DISCONTINUED] Cholecalciferol (VITAMIN D3) 25 MCG TABS Take 1 tablet by mouth daily (Patient not taking: Reported on 3/3/2021) 90 tablet 1     No facility-administered encounter medications on file as of 3/3/2021. No follow-ups on file.         Reviewed recent labs related to Julian's current problems      Discussed importance of regular Health Maintenance follow up  Health Maintenance   Topic    AAA screen     Hepatitis C screen     Diabetic foot exam     Diabetic retinal exam     COVID-19 Vaccine (1 of 2)    DTaP/Tdap/Td vaccine (1 - Tdap)    Shingles Vaccine (1 of 2)    Colon cancer screen colonoscopy     Pneumococcal 65+ years Vaccine (1 of 1 - PPSV23)    Flu vaccine (1)    A1C test (Diabetic or Prediabetic)     Diabetic microalbuminuria test     Lipid screen     Potassium monitoring     Creatinine monitoring     PSA counseling     Annual Wellness Visit (AWV)     Hepatitis A vaccine     Hib vaccine     Meningococcal (ACWY) vaccine

## 2021-03-03 NOTE — PATIENT INSTRUCTIONS
Patient Education        Learning About Diabetes Food Guidelines  Your Care Instructions     Meal planning is important to manage diabetes. It helps keep your blood sugar at a target level (which you set with your doctor). You don't have to eat special foods. You can eat what your family eats, including sweets once in a while. But you do have to pay attention to how often you eat and how much you eat of certain foods. You may want to work with a dietitian or a certified diabetes educator (CDE) to help you plan meals and snacks. A dietitian or CDE can also help you lose weight if that is one of your goals. What should you know about eating carbs? Managing the amount of carbohydrate (carbs) you eat is an important part of healthy meals when you have diabetes. Carbohydrate is found in many foods. · Learn which foods have carbs. And learn the amounts of carbs in different foods. ? Bread, cereal, pasta, and rice have about 15 grams of carbs in a serving. A serving is 1 slice of bread (1 ounce), ½ cup of cooked cereal, or 1/3 cup of cooked pasta or rice. ? Fruits have 15 grams of carbs in a serving. A serving is 1 small fresh fruit, such as an apple or orange; ½ of a banana; ½ cup of cooked or canned fruit; ½ cup of fruit juice; 1 cup of melon or raspberries; or 2 tablespoons of dried fruit. ? Milk and no-sugar-added yogurt have 15 grams of carbs in a serving. A serving is 1 cup of milk or 2/3 cup of no-sugar-added yogurt. ? Starchy vegetables have 15 grams of carbs in a serving. A serving is ½ cup of mashed potatoes or sweet potato; 1 cup winter squash; ½ of a small baked potato; ½ cup of cooked beans; or ½ cup cooked corn or green peas. · Learn how much carbs to eat each day and at each meal. A dietitian or CDE can teach you how to keep track of the amount of carbs you eat. This is called carbohydrate counting. · If you are not sure how to count carbohydrate grams, use the Plate Method to plan meals.  It is a good, quick way to make sure that you have a balanced meal. It also helps you spread carbs throughout the day. ? Divide your plate by types of foods. Put non-starchy vegetables on half the plate, meat or other protein food on one-quarter of the plate, and a grain or starchy vegetable in the final quarter of the plate. To this you can add a small piece of fruit and 1 cup of milk or yogurt, depending on how many carbs you are supposed to eat at a meal.  · Try to eat about the same amount of carbs at each meal. Do not \"save up\" your daily allowance of carbs to eat at one meal.  · Proteins have very little or no carbs per serving. Examples of proteins are beef, chicken, turkey, fish, eggs, tofu, cheese, cottage cheese, and peanut butter. A serving size of meat is 3 ounces, which is about the size of a deck of cards. Examples of meat substitute serving sizes (equal to 1 ounce of meat) are 1/4 cup of cottage cheese, 1 egg, 1 tablespoon of peanut butter, and ½ cup of tofu. How can you eat out and still eat healthy? · Learn to estimate the serving sizes of foods that have carbohydrate. If you measure food at home, it will be easier to estimate the amount in a serving of restaurant food. · If the meal you order has too much carbohydrate (such as potatoes, corn, or baked beans), ask to have a low-carbohydrate food instead. Ask for a salad or green vegetables. · If you use insulin, check your blood sugar before and after eating out to help you plan how much to eat in the future. · If you eat more carbohydrate at a meal than you had planned, take a walk or do other exercise. This will help lower your blood sugar. What else should you know? · Limit saturated fat, such as the fat from meat and dairy products. This is a healthy choice because people who have diabetes are at higher risk of heart disease. So choose lean cuts of meat and nonfat or low-fat dairy products.  Use olive or canola oil instead of butter or shortening when cooking. · Don't skip meals. Your blood sugar may drop too low if you skip meals and take insulin or certain medicines for diabetes. · Check with your doctor before you drink alcohol. Alcohol can cause your blood sugar to drop too low. Alcohol can also cause a bad reaction if you take certain diabetes medicines. Follow-up care is a key part of your treatment and safety. Be sure to make and go to all appointments, and call your doctor if you are having problems. It's also a good idea to know your test results and keep a list of the medicines you take. Where can you learn more? Go to https://chpepiceweb.PacketSled. org and sign in to your [employer] account. Enter P151 in the [employer] box to learn more about \"Learning About Diabetes Food Guidelines. \"     If you do not have an account, please click on the \"Sign Up Now\" link. Current as of: December 20, 2019               Content Version: 12.6  © 9032-8141 [employer]. Care instructions adapted under license by [facility] ([facility]). If you have questions about a medical condition or this instruction, always ask your healthcare professional. [place] [address] any warranty or liability for your use of this information. Patient Education        Low Sodium Diet (2,000 Milligram): Care Instructions  Your Care Instructions     Too much sodium causes your body to hold on to extra water. This can raise your blood pressure and force your heart and kidneys to work harder. In very serious cases, this could cause you to be put in the hospital. It might even be life-threatening. By limiting sodium, you will feel better and lower your risk of serious problems. The most common source of sodium is salt. People get most of the salt in their diet from canned, prepared, and packaged foods. Fast food and restaurant meals also are very high in sodium.  Your doctor will probably limit your sodium to less than 2,000 milligrams (mg) a day. This limit counts all the sodium in prepared and packaged foods and any salt you add to your food. Follow-up care is a key part of your treatment and safety. Be sure to make and go to all appointments, and call your doctor if you are having problems. It's also a good idea to know your test results and keep a list of the medicines you take. How can you care for yourself at home? Read food labels  · Read labels on cans and food packages. The labels tell you how much sodium is in each serving. Make sure that you look at the serving size. If you eat more than the serving size, you have eaten more sodium. · Food labels also tell you the Percent Daily Value for sodium. Choose products with low Percent Daily Values for sodium. · Be aware that sodium can come in forms other than salt, including monosodium glutamate (MSG), sodium citrate, and sodium bicarbonate (baking soda). MSG is often added to Asian food. When you eat out, you can sometimes ask for food without MSG or added salt. Buy low-sodium foods  · Buy foods that are labeled \"unsalted\" (no salt added), \"sodium-free\" (less than 5 mg of sodium per serving), or \"low-sodium\" (less than 140 mg of sodium per serving). Foods labeled \"reduced-sodium\" and \"light sodium\" may still have too much sodium. Be sure to read the label to see how much sodium you are getting. · Buy fresh vegetables, or frozen vegetables without added sauces. Buy low-sodium versions of canned vegetables, soups, and other canned goods. Prepare low-sodium meals  · Cut back on the amount of salt you use in cooking. This will help you adjust to the taste. Do not add salt after cooking. One teaspoon of salt has about 2,300 mg of sodium. · Take the salt shaker off the table. · Flavor your food with garlic, lemon juice, onion, vinegar, herbs, and spices. Do not use soy sauce, lite soy sauce, steak sauce, onion salt, garlic salt, celery salt, mustard, or ketchup on your food.   · Use low-sodium salad dressings, sauces, and ketchup. Or make your own salad dressings and sauces without adding salt. · Use less salt (or none) when recipes call for it. You can often use half the salt a recipe calls for without losing flavor. Other foods such as rice, pasta, and grains do not need added salt. · Rinse canned vegetables, and cook them in fresh water. This removes some--but not all--of the salt. · Avoid water that is naturally high in sodium or that has been treated with water softeners, which add sodium. Call your local water company to find out the sodium content of your water supply. If you buy bottled water, read the label and choose a sodium-free brand. Avoid high-sodium foods  · Avoid eating:  ? Smoked, cured, salted, and canned meat, fish, and poultry. ? Ham, swain, hot dogs, and luncheon meats. ? Regular, hard, and processed cheese and regular peanut butter. ? Crackers with salted tops, and other salted snack foods such as pretzels, chips, and salted popcorn. ? Frozen prepared meals, unless labeled low-sodium. ? Canned and dried soups, broths, and bouillon, unless labeled sodium-free or low-sodium. ? Canned vegetables, unless labeled sodium-free or low-sodium. ? Western Yumiko fries, pizza, tacos, and other fast foods. ? Pickles, olives, ketchup, and other condiments, especially soy sauce, unless labeled sodium-free or low-sodium. Where can you learn more? Go to https://Aunt Aggie's FoodspePlayLab.Incentive Targeting. org and sign in to your [employer] account. Enter O291 in the [facility] box to learn more about \"Low Sodium Diet (2,000 Milligram): Care Instructions. \"     If you do not have an account, please click on the \"Sign Up Now\" link. Current as of: August 22, 2019               Content Version: 12.6  © 1825-9781 [employer]. Care instructions adapted under license by [facility] ([facility]).  If you have questions about a medical condition or this instruction, always ask your healthcare professional. Titus Lainez any warranty or liability for your use of this information. Patient Education        Learning About Low-Sodium Foods  What foods are low in sodium? The foods you eat contain nutrients, such as vitamins and minerals. Sodium is a nutrient. Your body needs the right amount to stay healthy and work as it should. You can use the list below to help you make choices about which foods to eat. Fruits  · Fresh, frozen, canned, or dried fruit  Vegetables  · Fresh or frozen vegetables, with no added salt  · Canned vegetables, low-sodium or with no added salt  Grains  · Bagels without salted tops  · Cereal with no added salt  · Corn tortillas  · Crackers with no added salt  · Oatmeal, cooked without salt  · Popcorn with no salt  · Pasta and noodles, cooked without salt  · Rice, cooked without salt  · Unsalted pretzels  Dairy and dairy alternatives  · Butter, unsalted  · Cream cheese  · Ice cream  · Milk  · Soy milk  Meats and other protein foods  · Beans and peas, canned with no salt  · Eggs  · Fresh fish (not smoked)  · Fresh meats (not smoked or cured)  · Nuts and nut butter, prepared without salt  · Poultry, not packaged with sodium solution  · Tofu, unseasoned  · Tuna, canned without salt  Seasonings  · Garlic  · Herbs and spices  · Lemon juice  · Mustard  · Olive oil  · Salt-free seasoning mixes  · Vinegar  Work with your doctor to find out how much of this nutrient you need. Depending on your health, you may need more or less of it in your diet. Where can you learn more? Go to https://Shogetheraicha.Rypos. org and sign in to your [employer] account. Enter Y793 in the [facility] box to learn more about \"Learning About Low-Sodium Foods. \"     If you do not have an account, please click on the \"Sign Up Now\" link. Current as of: August 22, 2019               Content Version: 12.6  © 9839-0822 [employer].    Care instructions adapted under license by [facility] ([facility]). If you have questions about a medical condition or this instruction, always ask your healthcare professional. [address] any warranty or liability for your use of this information. Patient Education        How to Read a Food Label to Limit Sodium: Care Instructions  Your Care Instructions  Sodium causes your body to hold on to extra water. This can raise your blood pressure and force your heart and kidneys to work harder. In very serious cases, this could cause you to be put in the hospital. It might even be life-threatening. By limiting sodium, you will feel better and lower your risk of serious problems. Processed foods, fast food, and restaurant foods are the major sources of dietary sodium. The most common name for sodium is salt. Try to limit how much sodium you eat to less than 2,300 milligrams (mg) a day. If you limit your sodium to 1,500 mg a day, you can lower your blood pressure even more. This limit counts all the salt that you eat in foods you cook or in packaged foods. Keep a list of everything you eat and drink. Follow-up care is a key part of your treatment and safety. Be sure to make and go to all appointments, and call your doctor if you are having problems. It's also a good idea to know your test results and keep a list of the medicines you take. How can you care for yourself at home? Read ingredient lists on food labels  · Read the list of ingredients on food labels to help you find how much sodium is in a food. The label lists the ingredients in a food in descending order (from the most to the least). If salt or sodium is high on the list, there may be a lot of sodium in the food. · Know that sodium has different names. Sodium is also called monosodium glutamate (MSG, common in [facility] food), sodium citrate, sodium alginate, sodium hydroxide, and sodium phosphate.   Read Nutrition Facts labels  · On most foods, there is a https://chpepiceweb.[email or web address] and sign in to your [employer] account. Enter 26 [id number] in the [facility] box to learn more about \"How to Read a Food Label to Limit Sodium: Care Instructions. \"     If you do not have an account, please click on the \"Sign Up Now\" link. Current as of: August 22, 2019               Content Version: 12.6  © 0693-5929 [employer]. Care instructions adapted under license by [facility] ([facility]). If you have questions about a medical condition or this instruction, always ask your healthcare professional. [address] any warranty or liability for your use of this information.

## 2021-03-04 ENCOUNTER — OFFICE VISIT (OUTPATIENT)
Dept: SURGERY | Age: 71
End: 2021-03-04
Payer: MEDICARE

## 2021-03-04 VITALS
BODY MASS INDEX: 26.55 KG/M2 | WEIGHT: 196 LBS | HEART RATE: 69 BPM | DIASTOLIC BLOOD PRESSURE: 81 MMHG | RESPIRATION RATE: 16 BRPM | HEIGHT: 72 IN | SYSTOLIC BLOOD PRESSURE: 138 MMHG | OXYGEN SATURATION: 97 %

## 2021-03-04 DIAGNOSIS — K44.9 HIATAL HERNIA WITHOUT GANGRENE AND OBSTRUCTION: Primary | ICD-10-CM

## 2021-03-04 DIAGNOSIS — K64.0 GRADE I HEMORRHOIDS: ICD-10-CM

## 2021-03-04 PROCEDURE — G8417 CALC BMI ABV UP PARAM F/U: HCPCS | Performed by: SURGERY

## 2021-03-04 PROCEDURE — G8427 DOCREV CUR MEDS BY ELIG CLIN: HCPCS | Performed by: SURGERY

## 2021-03-04 PROCEDURE — 1036F TOBACCO NON-USER: CPT | Performed by: SURGERY

## 2021-03-04 PROCEDURE — G8484 FLU IMMUNIZE NO ADMIN: HCPCS | Performed by: SURGERY

## 2021-03-04 PROCEDURE — 4040F PNEUMOC VAC/ADMIN/RCVD: CPT | Performed by: SURGERY

## 2021-03-04 PROCEDURE — 3017F COLORECTAL CA SCREEN DOC REV: CPT | Performed by: SURGERY

## 2021-03-04 PROCEDURE — 99214 OFFICE O/P EST MOD 30 MIN: CPT | Performed by: SURGERY

## 2021-03-04 PROCEDURE — 1123F ACP DISCUSS/DSCN MKR DOCD: CPT | Performed by: SURGERY

## 2021-03-04 RX ORDER — PEG-3350, SODIUM SULFATE, SODIUM CHLORIDE, POTASSIUM CHLORIDE, SODIUM ASCORBATE AND ASCORBIC ACID 7.5-2.691G
100 KIT ORAL ONCE
Qty: 1 BOTTLE | Refills: 0 | Status: SHIPPED | OUTPATIENT
Start: 2021-03-04 | End: 2021-03-04

## 2021-03-04 RX ORDER — SODIUM, POTASSIUM,MAG SULFATES 17.5-3.13G
1 SOLUTION, RECONSTITUTED, ORAL ORAL 2 TIMES DAILY
Qty: 2 BOTTLE | Refills: 0 | Status: SHIPPED | OUTPATIENT
Start: 2021-03-04 | End: 2021-03-05

## 2021-03-04 NOTE — PROGRESS NOTES
[facility]  General Surgery Attending Progress Note    Chief Complaint:  Follow up for thrombosed hemorrhoids       Subjective:   67 yo who I saw on 1/15 for thrombosed hemorrhoids. These have resolved. He is no longer in pain. This stopped about 1 month ago. He has been sitting in a car for long periods without pain. No bleeding anymore. Last colonoscopy was 7 years ago. He has a known hiatal hernia and takes small bites. Sometimes food gets stuck if he takes big bites. He occasionally has acid reflux  She has been taking nothing for pain. No fevers. No chills    He was diagnosed with a pulmonary embolus in December after being diagnosed with COVID in December    I have reviewed and confirmed the past medical history, surgical history, social history, allergies in the chart. Medications: I have reviewed the medication list in the chart. Review of Systems - History obtained from the patient  General ROS: negative  Psychological ROS: negative  Ophthalmic ROS: negative  Allergy and Immunology ROS: negative  Hematological and Lymphatic ROS: negative  Endocrine ROS: negative  Breast ROS: negative  Respiratory ROS: negative  Cardiovascular ROS: negative  Gastrointestinal ROS: neg  Genito-Urinary ROS: negative  Musculoskeletal ROS: negative      Objective:     Vitals:    03/04/21 1239   BP: 138/81   Pulse: 69   Resp: 16   SpO2: 97%     Physical Exam  Constitutional:       General: He is not in acute distress. Appearance: Normal appearance. He is normal weight. He is not ill-appearing or toxic-appearing. HENT:      Head: Normocephalic. Right Ear: Tympanic membrane and external ear normal.      Left Ear: Tympanic membrane and external ear normal.      Nose: Nose normal.      Mouth/Throat:      Mouth: Mucous membranes are dry. Pharynx: Oropharynx is clear. Eyes:      Extraocular Movements: Extraocular movements intact.       Conjunctiva/sclera: Conjunctivae normal. I discussed the risks, benefits, and alternatives to colonoscopy with possible biopsy/cauterization/polylpectomy with deep sedation with the patient including the risks of deep sedation (hypotension, hypoxia), bleeding, and perforation (<1%). The patient understands the above and agrees to proceed. Plan on EGD and Colonoscopy in April  Will need to hold eliquis for 3 days prior         On this date 3/4/2021 I have spent 35 minutes reviewing previous notes, test results and face to face with the patient discussing the diagnosis and importance of compliance with the treatment plan as well as documenting on the day of the visit. Paul Back MD, FACS  3/4/2021  12:51 PM      NOTE: This report was transcribed using voice recognition software. Every effort was made to ensure accuracy; however, inadvertent computerized transcription errors may be present.

## 2021-03-04 NOTE — PATIENT INSTRUCTIONS
[address]  SUPREP  COLON PREP FOR COLONOSCOPY OR COLON SURGERY    It is very important that you follow all of the instructions listed on this sheet carefully (they may be slightly different than the directions on the product that you purchase at the pharmacy) to ensure that your colon is adequately cleaned out or your risk of complications could be increased. 2 Days or More Before Endoscopy:  ? Obtain SUPREP from the pharmacy. ? Do not eat corn, tomatoes, peas or watermelon 3 to 5 days before procedure. · Two days before the colonoscopy, fill both of the 16 oz bottles [facility] with water and put in the refrigerator to get cold  · Start clear liquid diet 2 days before the procedure  ? If you are on INSULIN or OTHER DIABETIC MEDICATIONS, then check with your primary care physician as to how to adjust your medication while on clear liquid diet and when nothing by mouth. 1 Day Before the Endoscopy:  ? No solid food  only clear liquids (soup, jello, or juice that you can see through with no solid food) for breakfast, lunch and supper. DO NOT drink or eat anything that is red as it will turn the inside of the colon red and look like blood. ? Have at least 8 oz or more of clear liquids for breakfast (7 am to 8 am) and lunch (11:30 am to 12:30 pm). ? 1:00 pm Drink at least 8 oz of clear liquids. ? 3:00 pm Drink at least 8 oz of clear liquids. ? 4:00 pm Drink one of the 16 oz bottles of the SUPREP followed immediately by at least 8 oz of clear liquids. ? 5:00 pm Drink at least 8 oz of clear liquids. ? Can continue to take  clear liquids    Day of Endoscopy:  ? 4 hours prior to scheduled time for colonoscopy, drink the second bottle of SUPREP followed immediately by at least 8 oz of clear liquids.   STOP ALL CLEAR LIQUIDS 2 HOURS PRIOR TO SCHEDULED TIME ? If any blood pressure medications or heart medications are due in the morning, you should take them with a sip of water. Instructions for Clear liquid diet  Definition  A clear liquid diet consists of clear liquids, such as water, broth and plain gelatin, that are easily digested and leave no undigested residue in your intestinal tract. Your doctor may prescribe a clear liquid diet before certain medical procedures or if you have certain digestive problems. Because a clear liquid diet can't provide you with adequate calories and nutrients, it shouldn't be continued for more than a few days. Purpose  A clear liquid diet is often used before tests, procedures or surgeries that require no food in your stomach or intestines, such as before colonoscopy. It may also be recommended as a short-term diet if you have certain digestive problems, such as nausea, vomiting or diarrhea, or after certain types of surgery. Diet details  A clear liquid diet helps maintain adequate hydration, provides some important electrolytes, such as sodium and potassium, and gives some energy at a time when a full diet isn't possible or recommended. The following foods are allowed in a clear liquid diet:   ? Plain water   ? Fruit juices without pulp, such as apple juice, grape juice or cranberry juice   ? Strained lemonade or fruit punch   ? Clear, fat-free broth (bouillon or consomme)   ? Clear sodas   ? Plain gelatin   ? Honey   ? Ice pops without bits of fruit or fruit pulp   ? Tea or coffee without milk or cream    Any foods not on the above list should be avoided. Also, for certain tests, such as colon exams, your doctor may ask you to avoid liquids or gelatin with red coloring.      A typical menu on the clear liquid diet may look like this:     Breakfast:  1 glass fruit juice  1 cup coffee or tea (without dairy products)  1 cup broth  1 bowl gelatin     Snack:  1 glass fruit juice  1 bowl gelatin     Lunch: 1 glass fruit juice  1 glass water  1 cup broth  1 bowl gelatin     Snack:  1 ice pop (without fruit pulp)  1 cup coffee or tea (without dairy products) or a soft drink     Dinner:  1 cup juice or water  1 cup broth  1 bowl gelatin  1 cup coffee or tea     Results  Although the clear liquid diet may not be very exciting, it does fulfill its purpose. It's designed to keep your stomach and intestines clear, limit strain to your digestive system, but keep your body hydrated as you prepare for or recover from a medical procedure. Risks  Because a clear liquid diet can't provide you with adequate calories and nutrients, it shouldn't be used for more than a few days. Only use the clear liquid diet as directed by your doctor. If your doctor prescribes a clear liquid diet before a medical test, be sure to follow the diet instructions exactly. If you don't follow the diet exactly, you risk an inaccurate test and may have to reschedule the procedure for another time. The importance of proper hydration  A colonoscopy prep causes the body to lose a significant amount of fluid and can result in sickness due to dehydration. It's important that you prepare your body by drinking extra clear liquids before the prep. Stay hydrated by drinking all required clear liquids during the prep. Replenish your system by drinking clear liquids after returning home from your colonoscopy.

## 2021-03-04 NOTE — PROGRESS NOTES
Overdue results letter mailed to patient regarding cologuard order.   Electronically signed by Olivia Heredia on 3/4/2021 at 9:01 AM

## 2021-03-05 ENCOUNTER — TELEPHONE (OUTPATIENT)
Dept: SURGERY | Age: 71
End: 2021-03-05

## 2021-03-09 ENCOUNTER — TELEPHONE (OUTPATIENT)
Dept: INTERNAL MEDICINE | Age: 71
End: 2021-03-09

## 2021-03-09 NOTE — TELEPHONE ENCOUNTER
Referral from [facility] lead MA to contact daughter re: questions for financial benefits due to COVID. LSW contacted daughter, but she could not provide answers related to pt;s present financial situation, therefore call to pt. Spoke with pt who reports not working since December due to his COVID diagnosis and subsequent need for surgical procedures. Pt is over the road , with home office in [place]. Advised pt to inquire of possible STD benefits which he reports receiving paperwork but has not completed. Advised to be diligent in completing his portion and then give to PCP for completion. Pt does receive his intermediate social security; apartment in [facility] is under his name of meggan. Provided pt with information re: [address]  relief assistance with rent, utilities and water and copied application and mailed to pt. Pt also concerned re: medical bills which he owes coinsurance; advised in detail of [facility] financial assistance process and also mailed applications. Denied any food insecurity concerns    Pt denied any other needs and was encouraged to call LSW as needed and number provided. Pt very appreciative .

## 2021-03-17 DIAGNOSIS — E11.9 TYPE 2 DIABETES MELLITUS WITHOUT COMPLICATION, WITHOUT LONG-TERM CURRENT USE OF INSULIN (HCC): ICD-10-CM

## 2021-03-17 NOTE — TELEPHONE ENCOUNTER
Pt called in and ask if he is supposed to take the glipizide he said he only has 6 pills left and if he is supposed to continue it can we send it to [facility] pharmacy on [address] [place].  Also pt said he had covid in Dec and wants to know if it is ok to get the vaccine through the pharmacy

## 2021-03-18 NOTE — TELEPHONE ENCOUNTER
Pt also asking if he should receive the COVID vaccine, he had COVID in December and wants to know if he should wait. Please advise.     Electronically signed by Bebe Antonio MA on 3/18/21 at 2:33 PM EDT

## 2021-03-19 RX ORDER — GLIPIZIDE 5 MG/1
5 TABLET ORAL DAILY
Qty: 90 TABLET | Refills: 1 | Status: SHIPPED
Start: 2021-03-19 | End: 2021-08-09

## 2021-04-05 ENCOUNTER — OFFICE VISIT (OUTPATIENT)
Dept: FAMILY MEDICINE CLINIC | Age: 71
End: 2021-04-05
Payer: MEDICARE

## 2021-04-05 VITALS
HEART RATE: 85 BPM | DIASTOLIC BLOOD PRESSURE: 64 MMHG | RESPIRATION RATE: 18 BRPM | TEMPERATURE: 97.5 F | OXYGEN SATURATION: 97 % | SYSTOLIC BLOOD PRESSURE: 112 MMHG | BODY MASS INDEX: 27.09 KG/M2 | WEIGHT: 200 LBS | HEIGHT: 72 IN

## 2021-04-05 DIAGNOSIS — K64.0 GRADE I HEMORRHOIDS: ICD-10-CM

## 2021-04-05 DIAGNOSIS — E11.9 TYPE 2 DIABETES MELLITUS WITHOUT COMPLICATION, WITHOUT LONG-TERM CURRENT USE OF INSULIN (HCC): Primary | ICD-10-CM

## 2021-04-05 DIAGNOSIS — G47.33 OSA (OBSTRUCTIVE SLEEP APNEA): ICD-10-CM

## 2021-04-05 DIAGNOSIS — I10 ESSENTIAL HYPERTENSION: ICD-10-CM

## 2021-04-05 DIAGNOSIS — R53.83 FATIGUE, UNSPECIFIED TYPE: ICD-10-CM

## 2021-04-05 DIAGNOSIS — I26.99 ACUTE PULMONARY EMBOLISM WITHOUT ACUTE COR PULMONALE, UNSPECIFIED PULMONARY EMBOLISM TYPE (HCC): ICD-10-CM

## 2021-04-05 DIAGNOSIS — K44.9 HIATAL HERNIA WITHOUT GANGRENE AND OBSTRUCTION: ICD-10-CM

## 2021-04-05 DIAGNOSIS — E78.5 DYSLIPIDEMIA: ICD-10-CM

## 2021-04-05 PROCEDURE — 3052F HG A1C>EQUAL 8.0%<EQUAL 9.0%: CPT | Performed by: FAMILY MEDICINE

## 2021-04-05 PROCEDURE — 99214 OFFICE O/P EST MOD 30 MIN: CPT | Performed by: FAMILY MEDICINE

## 2021-04-05 PROCEDURE — 3017F COLORECTAL CA SCREEN DOC REV: CPT | Performed by: FAMILY MEDICINE

## 2021-04-05 PROCEDURE — 1123F ACP DISCUSS/DSCN MKR DOCD: CPT | Performed by: FAMILY MEDICINE

## 2021-04-05 PROCEDURE — 2022F DILAT RTA XM EVC RTNOPTHY: CPT | Performed by: FAMILY MEDICINE

## 2021-04-05 PROCEDURE — 4040F PNEUMOC VAC/ADMIN/RCVD: CPT | Performed by: FAMILY MEDICINE

## 2021-04-05 PROCEDURE — G8417 CALC BMI ABV UP PARAM F/U: HCPCS | Performed by: FAMILY MEDICINE

## 2021-04-05 PROCEDURE — 1036F TOBACCO NON-USER: CPT | Performed by: FAMILY MEDICINE

## 2021-04-05 PROCEDURE — G8427 DOCREV CUR MEDS BY ELIG CLIN: HCPCS | Performed by: FAMILY MEDICINE

## 2021-04-05 ASSESSMENT — ENCOUNTER SYMPTOMS
DIARRHEA: 0
ABDOMINAL DISTENTION: 0
CHOKING: 0
EYE PAIN: 0
COLOR CHANGE: 0
VOICE CHANGE: 0
BACK PAIN: 0
ORTHOPNEA: 0
EYE DISCHARGE: 0
SINUS PAIN: 0
FACIAL SWELLING: 0
RECTAL PAIN: 0
PHOTOPHOBIA: 0
CONSTIPATION: 0
RESPIRATORY NEGATIVE: 1
RHINORRHEA: 0
APNEA: 0
VISUAL CHANGE: 1
TROUBLE SWALLOWING: 0
EYE ITCHING: 0
ANAL BLEEDING: 1
BLOOD IN STOOL: 0
ALLERGIC/IMMUNOLOGIC NEGATIVE: 1
SHORTNESS OF BREATH: 0
STRIDOR: 0
SINUS PRESSURE: 0
WHEEZING: 0
BLURRED VISION: 0
CHEST TIGHTNESS: 0
EYE REDNESS: 0

## 2021-04-05 NOTE — PATIENT INSTRUCTIONS

## 2021-04-05 NOTE — PROGRESS NOTES
negatives for diabetic complications include no autonomic neuropathy, CVA, heart disease, nephropathy, peripheral neuropathy, PVD (H/O PE post COVID\) or retinopathy. Risk factors for coronary artery disease include male sex, hypertension and diabetes mellitus. Current diabetic treatment includes diet and oral agent (dual therapy). He is compliant with treatment some of the time. He is following a generally unhealthy diet. An ACE inhibitor/angiotensin II receptor blocker is not being taken. ROS:  Review of Systems   Constitutional: Positive for fatigue and malaise/fatigue. Negative for activity change, appetite change, unexpected weight change and weight loss. HENT: Negative. Negative for dental problem, drooling, ear discharge, ear pain, facial swelling, hearing loss, mouth sores, nosebleeds, postnasal drip, rhinorrhea, sinus pressure, sinus pain, sneezing, tinnitus, trouble swallowing and voice change. Eyes: Negative for blurred vision, photophobia, pain, discharge, redness, itching and visual disturbance. Respiratory: Negative. Negative for apnea, choking, chest tightness, shortness of breath, wheezing and stridor. Cardiovascular: Negative. Negative for chest pain, palpitations, orthopnea, leg swelling and PND. Gastrointestinal: Positive for anal bleeding. Negative for abdominal distention, blood in stool, constipation, diarrhea and rectal pain. Endocrine: Negative. Negative for cold intolerance, heat intolerance, polydipsia, polyphagia and polyuria. Genitourinary: Negative. Negative for decreased urine volume, difficulty urinating, discharge, dysuria, enuresis, flank pain, frequency, genital sores, hematuria, penile pain, penile swelling, scrotal swelling, testicular pain and urgency. Musculoskeletal: Negative. Negative for back pain, gait problem, neck pain and neck stiffness. Skin: Negative. Negative for color change, pallor and wound. Allergic/Immunologic: Negative. Negative for environmental allergies, food allergies and immunocompromised state. Neurological: Positive for weakness. Negative for dizziness, tremors, seizures, syncope, facial asymmetry, speech difficulty, light-headedness and headaches. Hematological: Negative. Negative for adenopathy. Does not bruise/bleed easily. Psychiatric/Behavioral: Negative. Negative for agitation, behavioral problems, confusion, decreased concentration, dysphoric mood, hallucinations, self-injury, sleep disturbance and suicidal ideas. The patient is not nervous/anxious and is not hyperactive. Past Medical/Surgical Hx;  Reviewed with patient  History reviewed. No pertinent past medical history. History reviewed. No pertinent surgical history. Past Family Hx:  Reviewed with patient  History reviewed. No pertinent family history. Social Hx:  Reviewed with patient  Social History     Tobacco Use    Smoking status: Former Smoker     Packs/day: 1.50     Years: 25.00     Pack years: 37.50     Types: Cigarettes     Start date: 1961     Quit date: 1986     Years since quittin.2    Smokeless tobacco: Never Used   Substance Use Topics    Alcohol use: Not Currently       OBJECTIVE  /64   Pulse 85   Temp 97.5 °F (36.4 °C)   Resp 18   Ht 6' (1.829 m)   Wt 200 lb (90.7 kg)   SpO2 97%   BMI 27.12 kg/m²     Problem List:  Frandy Vera does not have any pertinent problems on file. PHYS EX:  Physical Exam  Vitals signs and nursing note reviewed. Constitutional:       General: He is not in acute distress. Appearance: Normal appearance. He is well-developed. He is not ill-appearing, toxic-appearing or diaphoretic. HENT:      Head: Normocephalic and atraumatic. Right Ear: External ear normal. There is no impacted cerumen. Left Ear: External ear normal. There is no impacted cerumen. Nose: Nose normal. No congestion or rhinorrhea.       Mouth/Throat:      Mouth: Mucous membranes are moist. Pharynx: Oropharynx is clear. No oropharyngeal exudate or posterior oropharyngeal erythema. Eyes:      General: No scleral icterus. Right eye: No discharge. Left eye: No discharge. Neck:      Musculoskeletal: Normal range of motion and neck supple. No neck rigidity or muscular tenderness. Thyroid: No thyromegaly. Vascular: No carotid bruit. Trachea: No tracheal deviation. Cardiovascular:      Rate and Rhythm: Normal rate and regular rhythm. Pulses: Normal pulses. Heart sounds: Normal heart sounds. No murmur. No friction rub. No gallop. Pulmonary:      Effort: Pulmonary effort is normal. No respiratory distress. Breath sounds: Normal breath sounds. No stridor. No wheezing, rhonchi or rales. Chest:      Chest wall: No tenderness. Abdominal:      General: Bowel sounds are normal. There is no distension. Palpations: Abdomen is soft. There is no mass. Tenderness: There is no abdominal tenderness. There is no right CVA tenderness, left CVA tenderness, guarding or rebound. Hernia: No hernia is present. Genitourinary:     Penis: No tenderness. Comments: Bleeding hemorrhoids   Musculoskeletal: Normal range of motion. General: No swelling, tenderness, deformity or signs of injury. Right lower leg: No edema. Left lower leg: No edema. Lymphadenopathy:      Cervical: No cervical adenopathy. Skin:     General: Skin is warm. Coloration: Skin is not jaundiced or pale. Findings: No bruising, erythema, lesion or rash. Neurological:      General: No focal deficit present. Mental Status: He is alert and oriented to person, place, and time. Cranial Nerves: No cranial nerve deficit. Sensory: No sensory deficit. Motor: No weakness or abnormal muscle tone. Coordination: Coordination normal.      Gait: Gait normal.      Deep Tendon Reflexes: Reflexes are normal and symmetric.  Reflexes normal. ASSESSMENT/PLAN  Julian was seen today for fatigue and health maintenance. Diagnoses and all orders for this visit:    Type 2 diabetes mellitus without complication, without long-term current use of insulin ([facility] [address]  -     Basic Metabolic Panel; Future  -     CBC Auto Differential; Future  -     Hemoglobin A1C; Future  -     Microalbumin, Ur; Future  Long talk on treatment and prevention  Literature is given     ---VASCULAR PANEL  A) asa, plavix, aggrenox  B) ELIQUIS, pletal, tzd, statin  C) ace, hctz, folic, ccb  D) cannikinumab, fish oils     ---CARDIAC---ELIQUIS, ace, beta, statin, hctz, ( ccb )      CUCA (obstructive sleep apnea)  -     Ambulatory referral to Sleep Medicine  -     Basic Metabolic Panel; Future  -     CBC Auto Differential; Future  --PLAN--aerosol accuneb 1.25 plus chest percussion--Rx      Essential hypertension cont  --patient is instructed on low to moderate sodium ( 2 to 2.5 grams ), daily    Also to increase potassium in the diet to about 3.5 grams daily    Literature is provided     -     Basic Metabolic Panel; Future  -     CBC Auto Differential; Future    Acute pulmonary embolism without acute cor pulmonale, unspecified pulmonary embolism type (HCC)  -     Basic Metabolic Panel; Future  -     CBC Auto Differential; Future  --due to COVID    Hiatal hernia without gangrene and obstruction  -     Basic Metabolic Panel; Future  -     CBC Auto Differential; Future  --he is set for an EGD    Grade I hemorrhoids  -     Basic Metabolic Panel; Future  -     CBC Auto Differential; Future  --he is set for a colonoscopy     Dyslipidemia  -     Basic Metabolic Panel; Future  -     Lipid Panel; Future  -     CBC Auto Differential; Future  --Mediterranean diet, exercise, weight loss, vitamins    We have a long talk on cholesterol and importance of lowering it       Fatigue, unspecified type  -     TSH without Reflex; Future  -     Uric Acid; Future  -     Basic Metabolic Panel;  Future  -     CBC Auto Differential; Future    BPH--referral to urologist is pending     Outpatient Encounter Medications as of 4/5/2021   Medication Sig Dispense Refill    glipiZIDE (GLUCOTROL) 5 MG tablet Take 1 tablet by mouth daily 90 tablet 1    metFORMIN (GLUCOPHAGE) 500 MG tablet Take 1 tablet by mouth 2 times daily (with meals) 180 tablet 1    Cholecalciferol (VITAMIN D3) 25 MCG TABS Take 1 tablet by mouth daily 90 tablet 1    apixaban (ELIQUIS) 5 MG TABS tablet Take 1 tablet by mouth 2 times daily 180 tablet 1    glucose monitoring kit (FREESTYLE) monitoring kit 1 kit by Does not apply route daily 1 kit 0    blood glucose monitor strips Test 2 times a day & as needed for symptoms of irregular blood glucose. 100 strip 3    Lancets MISC 1 each by Does not apply route daily 100 each 3    hydrocortisone (ANUSOL-HC) 2.5 % CREA rectal cream Apply bid 28 g 3     No facility-administered encounter medications on file as of 4/5/2021. Return in about 4 weeks (around 5/3/2021).         Reviewed recent labs related to Lugene's current problems      Discussed importance of regular Health Maintenance follow up  Health Maintenance   Topic    AAA screen     Hepatitis C screen     Diabetic foot exam     Diabetic retinal exam     COVID-19 Vaccine (1)    DTaP/Tdap/Td vaccine (1 - Tdap)    Shingles Vaccine (1 of 2)    Colon cancer screen colonoscopy     Pneumococcal 65+ years Vaccine (1 of 1 - PPSV23)    Flu vaccine (Season Ended)    A1C test (Diabetic or Prediabetic)     Diabetic microalbuminuria test     Lipid screen     Potassium monitoring     Creatinine monitoring     PSA counseling     Annual Wellness Visit (AWV)     Hepatitis A vaccine     Hib vaccine     Meningococcal (ACWY) vaccine

## 2021-04-09 ENCOUNTER — TELEPHONE (OUTPATIENT)
Dept: SLEEP CENTER | Age: 71
End: 2021-04-09

## 2021-04-09 NOTE — PROGRESS NOTES
Patient agreed to COVID test on 4/15 at the  [facility], between the hours of 6 am-2:30 pm located at  [address] [place] [address]. Patient instructed to bring ID. Patient instructed to self isolate until day of surgery.

## 2021-04-10 ENCOUNTER — IMMUNIZATION (OUTPATIENT)
Dept: PRIMARY CARE CLINIC | Age: 71
End: 2021-04-10
Payer: MEDICARE

## 2021-04-10 PROCEDURE — 91301 COVID-19, MODERNA VACCINE 100MCG/0.5ML DOSE: CPT | Performed by: FAMILY MEDICINE

## 2021-04-10 PROCEDURE — 0011A COVID-19, MODERNA VACCINE 100MCG/0.5ML DOSE: CPT | Performed by: FAMILY MEDICINE

## 2021-04-15 ENCOUNTER — HOSPITAL ENCOUNTER (OUTPATIENT)
Age: 71
Discharge: HOME | End: 2021-04-17
Payer: MEDICARE

## 2021-04-15 DIAGNOSIS — U07.1 COVID-19: ICD-10-CM

## 2021-04-15 PROCEDURE — U0003 INFECTIOUS AGENT DETECTION BY NUCLEIC ACID (DNA OR RNA); SEVERE ACUTE RESPIRATORY SYNDROME CORONAVIRUS 2 (SARS-COV-2) (CORONAVIRUS DISEASE [COVID-19]), AMPLIFIED PROBE TECHNIQUE, MAKING USE OF HIGH THROUGHPUT TECHNOLOGIES AS DESCRIBED BY CMS-2020-01-R: HCPCS

## 2021-04-15 PROCEDURE — U0005 INFEC AGEN DETEC AMPLI PROBE: HCPCS

## 2021-04-16 LAB
SARS-COV-2: NOT DETECTED
SOURCE: NORMAL

## 2021-04-16 NOTE — PROGRESS NOTES
allowed to park in this lot. All other cars are to park on [address] either in the parking garage or the handicap lot. Walk up the front walk to the [facility], the door will be locked an employee will greet you and let you in. EDUCATION INSTRUCTIONS:          [x]Pain: Post-op pain is normal and to be expected. You will be asked to rate your pain from 0-10 (a zero is not acceptable-education is needed). Your post-op pain goal is:  [x] Ask your nurse for your pain medication. [x] Other:  Wear loose comfortable clothing      MEDICATION INSTRUCTIONS:  [x]Bring a complete list of your medications, please write the last time you took the medicine, give this list to the nurse.  [] Take the following medications the morning of surgery with 1-2 ounces of water:  None  [x] Stop herbal supplements and vitamins 5 days before your surgery. [x] DO NOT take any diabetic medicine the morning of surgery. Follow instructions for insulin the day before surgery. Not prescribed insulin  [x] If you are diabetic and your blood sugar is low or you feel symptomatic, you may drink 1-2 ounces of apple juice or take a glucose tablet. The morning of your procedure, you may call the pre-op area if you have concerns about your blood sugar [phone]. [x] Follow physician instructions regarding any blood thinners you may be taking. WHAT TO EXPECT:  [x] The day of surgery you will be greeted and checked in by the [employer]. Please bring your photo ID and insurance card. A nurse will greet you in accordance to the time you are needed in the pre-op area to prepare you for surgery. Please do not be discouraged if you are not greeted in the order you arrive as there are many variables that are involved in patient preparation. Your patience is greatly appreciated as you wait for your nurse.   Please bring in items such as: books, magazines, newspapers, electronics, or any other items  to occupy your time in the waiting area. [x]  Delays may occur with surgery and staff will make a sincere effort to keep you informed of delays. If any delays occur with your procedure, we apologize ahead of time for your inconvenience as we recognize the value of your time.

## 2021-04-19 ENCOUNTER — HOSPITAL ENCOUNTER (OUTPATIENT)
Dept: SLEEP CENTER | Age: 71
Discharge: HOME | End: 2021-04-19
Payer: MEDICARE

## 2021-04-19 DIAGNOSIS — G47.33 OSA (OBSTRUCTIVE SLEEP APNEA): Primary | ICD-10-CM

## 2021-04-19 PROCEDURE — 95806 SLEEP STUDY UNATT&RESP EFFT: CPT | Performed by: STUDENT IN AN ORGANIZED HEALTH CARE EDUCATION/TRAINING PROGRAM

## 2021-04-19 PROCEDURE — 95806 SLEEP STUDY UNATT&RESP EFFT: CPT

## 2021-04-20 ENCOUNTER — ANESTHESIA (OUTPATIENT)
Dept: ENDOSCOPY | Age: 71
End: 2021-04-20
Payer: MEDICARE

## 2021-04-20 ENCOUNTER — ANESTHESIA EVENT (OUTPATIENT)
Dept: ENDOSCOPY | Age: 71
End: 2021-04-20
Payer: MEDICARE

## 2021-04-20 ENCOUNTER — HOSPITAL ENCOUNTER (OUTPATIENT)
Age: 71
Setting detail: OUTPATIENT SURGERY
Discharge: HOME | End: 2021-04-20
Attending: SURGERY | Admitting: SURGERY
Payer: MEDICARE

## 2021-04-20 VITALS
DIASTOLIC BLOOD PRESSURE: 45 MMHG | RESPIRATION RATE: 16 BRPM | OXYGEN SATURATION: 97 % | SYSTOLIC BLOOD PRESSURE: 95 MMHG

## 2021-04-20 VITALS
DIASTOLIC BLOOD PRESSURE: 60 MMHG | RESPIRATION RATE: 16 BRPM | OXYGEN SATURATION: 99 % | BODY MASS INDEX: 27.09 KG/M2 | SYSTOLIC BLOOD PRESSURE: 132 MMHG | HEART RATE: 58 BPM | TEMPERATURE: 97 F | WEIGHT: 200 LBS | HEIGHT: 72 IN

## 2021-04-20 DIAGNOSIS — U07.1 COVID-19: Primary | ICD-10-CM

## 2021-04-20 DIAGNOSIS — Z01.818 PRE-OP TESTING: ICD-10-CM

## 2021-04-20 LAB — METER GLUCOSE: 221 MG/DL (ref 74–99)

## 2021-04-20 PROCEDURE — 2709999900 HC NON-CHARGEABLE SUPPLY: Performed by: SURGERY

## 2021-04-20 PROCEDURE — 3609010300 HC COLONOSCOPY W/BIOPSY SINGLE/MULTIPLE: Performed by: SURGERY

## 2021-04-20 PROCEDURE — 7100000010 HC PHASE II RECOVERY - FIRST 15 MIN: Performed by: SURGERY

## 2021-04-20 PROCEDURE — 3609012400 HC EGD TRANSORAL BIOPSY SINGLE/MULTIPLE: Performed by: SURGERY

## 2021-04-20 PROCEDURE — 6360000002 HC RX W HCPCS: Performed by: NURSE ANESTHETIST, CERTIFIED REGISTERED

## 2021-04-20 PROCEDURE — 3700000000 HC ANESTHESIA ATTENDED CARE: Performed by: SURGERY

## 2021-04-20 PROCEDURE — 7100000011 HC PHASE II RECOVERY - ADDTL 15 MIN: Performed by: SURGERY

## 2021-04-20 PROCEDURE — 2580000003 HC RX 258: Performed by: SURGERY

## 2021-04-20 PROCEDURE — 88305 TISSUE EXAM BY PATHOLOGIST: CPT

## 2021-04-20 PROCEDURE — 3700000001 HC ADD 15 MINUTES (ANESTHESIA): Performed by: SURGERY

## 2021-04-20 PROCEDURE — 45380 COLONOSCOPY AND BIOPSY: CPT | Performed by: SURGERY

## 2021-04-20 PROCEDURE — 43239 EGD BIOPSY SINGLE/MULTIPLE: CPT | Performed by: SURGERY

## 2021-04-20 PROCEDURE — 82962 GLUCOSE BLOOD TEST: CPT

## 2021-04-20 RX ORDER — PROPOFOL 10 MG/ML
INJECTION, EMULSION INTRAVENOUS PRN
Status: DISCONTINUED | OUTPATIENT
Start: 2021-04-20 | End: 2021-04-20 | Stop reason: SDUPTHER

## 2021-04-20 RX ORDER — PROPOFOL 10 MG/ML
INJECTION, EMULSION INTRAVENOUS CONTINUOUS PRN
Status: DISCONTINUED | OUTPATIENT
Start: 2021-04-20 | End: 2021-04-20 | Stop reason: SDUPTHER

## 2021-04-20 RX ORDER — SODIUM CHLORIDE 0.9 % (FLUSH) 0.9 %
5-40 SYRINGE (ML) INJECTION PRN
Status: DISCONTINUED | OUTPATIENT
Start: 2021-04-20 | End: 2021-04-20 | Stop reason: HOSPADM

## 2021-04-20 RX ORDER — SODIUM CHLORIDE 9 MG/ML
25 INJECTION, SOLUTION INTRAVENOUS PRN
Status: DISCONTINUED | OUTPATIENT
Start: 2021-04-20 | End: 2021-04-20 | Stop reason: HOSPADM

## 2021-04-20 RX ORDER — SODIUM CHLORIDE 0.9 % (FLUSH) 0.9 %
5-40 SYRINGE (ML) INJECTION EVERY 12 HOURS SCHEDULED
Status: DISCONTINUED | OUTPATIENT
Start: 2021-04-20 | End: 2021-04-20 | Stop reason: HOSPADM

## 2021-04-20 RX ORDER — SODIUM CHLORIDE 9 MG/ML
INJECTION, SOLUTION INTRAVENOUS CONTINUOUS
Status: DISCONTINUED | OUTPATIENT
Start: 2021-04-20 | End: 2021-04-20 | Stop reason: HOSPADM

## 2021-04-20 RX ADMIN — PROPOFOL 100 MCG/KG/MIN: 10 INJECTION, EMULSION INTRAVENOUS at 13:35

## 2021-04-20 RX ADMIN — PROPOFOL 150 MG: 10 INJECTION, EMULSION INTRAVENOUS at 13:35

## 2021-04-20 RX ADMIN — SODIUM CHLORIDE: 9 INJECTION, SOLUTION INTRAVENOUS at 13:14

## 2021-04-20 ASSESSMENT — PAIN - FUNCTIONAL ASSESSMENT: PAIN_FUNCTIONAL_ASSESSMENT: 0-10

## 2021-04-20 ASSESSMENT — PAIN SCALES - GENERAL: PAINLEVEL_OUTOF10: 0

## 2021-04-20 NOTE — OP NOTE
[address] [place] [address]  LOWER ENDOSCOPY REPORT    DATE OF PROCEDURE: 4/20/2021    SURGEON: ISABEL Begum: None    PREOPERATIVE DIAGNOSIS: Low risk colorectal cancer screening    POSTOPERATIVE DIAGNOSIS: Same; mild sigmoid diverticulosis, 2 mm sigmoid polyp, moderate internal hemorrhoids    OPERATION: Total colonoscopy to cecum  With cold biopsy removal of sigmoid polyp    ANESTHESIA: Local monitored anesthesia. ESTIMATED BLOOD LOSS: less than 5 ml    COMPLICATIONS: None. SPECIMENS:  Was Obtained: sigmoid polyp    HISTORY: The patient is a 66y.o. year old male with history of above preop diagnosis. I recommended colonoscopy with possible biopsy or polypectomy and I explained the risk, benefits, expected outcome, and alternatives to the procedure. Risks included but are not limited to bleeding, infection, respiratory distress, hypotension, and perforation of the colon. The patient understands and is in agreement. PROCEDURE: The patient was given IV conscious sedation per anesthesia. The patient was given supplemental oxygen by nasal cannula. I performed a digital rectal exam and no masses were palpated . The colonoscope was inserted per rectum and advanced under direct vision to the cecum without difficulty. The prep was excellent so exam was adequate. FINDINGS:  Cecum/Ascending colon: appendiceal orifice noted, iliocecal valve visualized, normal    Transverse colon: normal    Descending/Sigmoid colon: abnormal: mild sigmoid diverticulosis  3 mm sigmoid polyp removed by cold biopsy forceps    Rectum/Anus: examined in normal and retroflexed positions and was abnormal: moderate internal hemorrhoids    The colon was decompressed and the scope was removed. The withdraw time was approximately 7 minutes. The patient tolerated the procedure well. ASSESSMENT/PLAN:   1. High fiber diet  2. Await pathology of sigmoid polyp  3.  Colorectal Cancer Screening - recommend

## 2021-04-20 NOTE — OP NOTE
[address] [place] [address]  UPPER ENDOSCOPY REPORT    DATE OF PROCEDURE: 4/20/21     SURGEON: Sharath Julio M.D.    ASSISTANT: none    PREOPERATIVE DIAGNOSIS: large hiatal hernia    POSTOPERATIVE DIAGNOSIS: 5 cm hiatal hernia, duodenal polyp    OPERATION: Esophagogastroduodenoscopy with biopsies    ANESTHESIA: Local monitored anesthesia. (refer to Anesthesia record for details)    ESTIMATED BLOOD LOSS:  0 ml    COMPLICATIONS: None    SPECIMENS:  Was Obtained: duodenal polyp biopsies    HISTORY: The patient is a 66y.o. year old male with history of above preop diagnosis. I recommended esophagogastroduodenoscopy with possible biopsy and I explained the risk, benefits, expected outcome, and alternatives to the procedure. Risks included but are not limited to bleeding, infection, respiratory distress, hypotension, and perforation of the esophagus, stomach, or duodenum. Patient understands and is in agreement. PROCEDURE: The patient was connected to the monitors and given supplemental oxygen by nasal cannula. The patient was sedated. The gastroscope was inserted orally and advanced under direct vision through the esophagus, through the stomach, through the pylorus, and into the descending duodenum. Findings:  Duodenum:     Descending: abnormal: 3 mm duodenal polyp and biopsies were taken    Bulb: normal    Stomach:    Antrum: normal      Body: normal    Fundus: normal    Esophagus: abnormal: large 5 cm hiatal hernia    Larynx: normal    The scope was removed and the patient tolerated the procedure well. IMPRESSION/PLAN:   1. Await duodenal polyp biopsies  2.  Large hiatal hernia present      Sharath Julio MD, FACS  4/20/2021  2:08 PM

## 2021-04-20 NOTE — H&P
[address] [facility]  General Surgery Attending Progress Note     Chief Complaint:  Follow up for thrombosed hemorrhoids         Subjective:   67 yo who I saw on 1/15 for thrombosed hemorrhoids. These have resolved. He is no longer in pain. This stopped about 1 month ago. He has been sitting in a car for long periods without pain. No bleeding anymore. Last colonoscopy was 7 years ago.     He has a known hiatal hernia and takes small bites. Sometimes food gets stuck if he takes big bites. He occasionally has acid reflux  She has been taking nothing for pain. No fevers. No chills     He was diagnosed with a pulmonary embolus in December after being diagnosed with COVID in December     I have reviewed and confirmed the past medical history, surgical history, social history, allergies in the chart. Medications: I have reviewed the medication list in the chart.     Review of Systems - History obtained from the patient  General ROS: negative  Psychological ROS: negative  Ophthalmic ROS: negative  Allergy and Immunology ROS: negative  Hematological and Lymphatic ROS: negative  Endocrine ROS: negative  Breast ROS: negative  Respiratory ROS: negative  Cardiovascular ROS: negative  Gastrointestinal ROS: neg  Genito-Urinary ROS: negative  Musculoskeletal ROS: negative        Objective:         Physical Exam  Constitutional:       General: He is not in acute distress. Appearance: Normal appearance. He is normal weight. He is not ill-appearing or toxic-appearing. HENT:      Head: Normocephalic. Right Ear: Tympanic membrane and external ear normal.      Left Ear: Tympanic membrane and external ear normal.      Nose: Nose normal.      Mouth/Throat:      Mouth: Mucous membranes are dry. Pharynx: Oropharynx is clear. Eyes:      Extraocular Movements: Extraocular movements intact. Conjunctiva/sclera: Conjunctivae normal.      Pupils: Pupils are equal, round, and reactive to light.    Neck: Musculoskeletal: Normal range of motion and neck supple. Cardiovascular:      Rate and Rhythm: Normal rate and regular rhythm. Pulses: Normal pulses. Heart sounds: Normal heart sounds. Pulmonary:      Effort: Pulmonary effort is normal.      Breath sounds: No rhonchi. Abdominal:      General: Abdomen is flat. There is no distension. Palpations: Abdomen is soft. Tenderness: There is no abdominal tenderness. There is no guarding. Skin:     General: Skin is warm and dry. Neurological:      General: No focal deficit present. Mental Status: He is alert and oriented to person, place, and time. Mental status is at baseline. Psychiatric:         Mood and Affect: Mood normal.         Behavior: Behavior normal.         Thought Content: Thought content normal.         Judgment: Judgment normal.               Assessment:          Patient Active Problem List   Diagnosis    Pneumonia due to COVID-19 virus    Hemorrhoid    Type 2 diabetes mellitus without complication, without long-term current use of insulin ([address]    Essential hypertension    Pulmonary embolus ([address]    Hiatal hernia without gangrene and obstruction            Plan:   --Thrombosed hemorrhoids resolved  --Hx of covid pna/ with Pulmonary embolus  Diagnosed end of December  On eliquis  --Large hiatal hernia  Plan on EGD  I have discussed the risks, benefits, and alternatives to esophagogastroduodenoscopy with possible biopsy with deep sedation with the patient. I have detailed the risks of deep sedation (hypotension, hypoxia) as well as complications of bleeding and perforation. The patient understands the above and agrees to proceed.   --Last colonoscopy was 7 years ago with polypectomy  Plan on Colonoscopy  I discussed the risks, benefits, and alternatives to colonoscopy with possible biopsy/cauterization/polylpectomy with deep sedation with the patient including the risks of deep sedation (hypotension, hypoxia), bleeding, and perforation (<1%).   The patient understands the above and agrees to proceed.        Plan on EGD and Colonoscopy in April  Will need to hold eliquis for 3 days prior              Jareth Escobar MD, FACS

## 2021-04-20 NOTE — ANESTHESIA PRE PROCEDURE
Problem List   Diagnosis Code    Pneumonia due to COVID-19 virus U07.1, J12.82    Hemorrhoid K64.9    Type 2 diabetes mellitus without complication, without long-term current use of insulin (HCC) E11.9    Essential hypertension I10    Pulmonary embolus (HCC) I26.99    Hiatal hernia without gangrene and obstruction K44.9       Past Medical History:        Diagnosis Date    Diabetes mellitus ([facility]        Past Surgical History:  History reviewed. No pertinent surgical history. Social History:    Social History     Tobacco Use    Smoking status: Former Smoker     Packs/day: 1.50     Years: 25.00     Pack years: 37.50     Types: Cigarettes     Start date: 1961     Quit date: 1986     Years since quittin.3    Smokeless tobacco: Never Used   Substance Use Topics    Alcohol use: Not Currently                                Counseling given: Not Answered      Vital Signs (Current):   Vitals:    21 1434   Weight: 200 lb (90.7 kg)   Height: 6' (1.829 m)                                              BP Readings from Last 3 Encounters:   21 112/64   21 138/81   21 118/70       NPO Status:                                                                                 BMI:   Wt Readings from Last 3 Encounters:   21 200 lb (90.7 kg)   21 200 lb (90.7 kg)   21 196 lb (88.9 kg)     Body mass index is 27.12 kg/m².     CBC:   Lab Results   Component Value Date    WBC 8.5 2021    RBC 4.65 2021    HGB 11.2 2021    HCT 36.9 2021    MCV 79.4 2021    RDW 20.3 2021     2021       CMP:   Lab Results   Component Value Date     2021    K 4.5 2021    K 3.7 2020     2021    CO2 21 2021    BUN 13 2021    CREATININE 1.0 2021    GFRAA >60 2021    LABGLOM >60 2021    GLUCOSE 188 2021    PROT 7.0 2021    CALCIUM 9.6 2021    BILITOT 0.3 2021 ALKPHOS 74 02/19/2021    AST 14 02/19/2021    ALT 13 02/19/2021       POC Tests: No results for input(s): POCGLU, POCNA, POCK, POCCL, POCBUN, POCHEMO, POCHCT in the last 72 hours. Coags: No results found for: PROTIME, INR, APTT    HCG (If Applicable): No results found for: PREGTESTUR, PREGSERUM, HCG, HCGQUANT     ABGs: No results found for: PHART, PO2ART, HTG4HIA, BOE3BVE, BEART, N0AUPHMZ     Type & Screen (If Applicable):  No results found for: LABABO, LABRH    Drug/Infectious Status (If Applicable):  No results found for: HIV, HEPCAB    COVID-19 Screening (If Applicable):   Lab Results   Component Value Date    COVID19 Not Detected 04/15/2021           Anesthesia Evaluation  Patient summary reviewed no history of anesthetic complications:   Airway: Mallampati: II     Neck ROM: full  Mouth opening: > = 3 FB Dental: normal exam         Pulmonary: breath sounds clear to auscultation  (+) pneumonia: resolved,                             Cardiovascular:    (+) hypertension:,         Rhythm: regular  Rate: normal           Beta Blocker:  Not on Beta Blocker         Neuro/Psych:               GI/Hepatic/Renal:   (+) hiatal hernia,           Endo/Other:    (+) DiabetesType II DM, using insulin, blood dyscrasia: anticoagulation therapy:., .          Pt had no PAT visit       Abdominal:           Vascular:   + PE. Anesthesia Plan      MAC     ASA 3       Induction: intravenous. Anesthetic plan and risks discussed with patient. Plan discussed with CRNA.                   Jef Gann MD   4/20/2021

## 2021-04-22 RX ORDER — FAMOTIDINE 20 MG/1
20 TABLET, FILM COATED ORAL 2 TIMES DAILY
Qty: 60 TABLET | Refills: 11 | Status: SHIPPED | OUTPATIENT
Start: 2021-04-22 | End: 2022-10-10 | Stop reason: SDUPTHER

## 2021-04-29 DIAGNOSIS — E11.9 TYPE 2 DIABETES MELLITUS WITHOUT COMPLICATION, WITHOUT LONG-TERM CURRENT USE OF INSULIN (HCC): ICD-10-CM

## 2021-04-29 RX ORDER — BLOOD SUGAR DIAGNOSTIC
STRIP MISCELLANEOUS
Qty: 100 STRIP | Refills: 3 | Status: SHIPPED
Start: 2021-04-29 | End: 2021-08-18

## 2021-05-03 ENCOUNTER — OFFICE VISIT (OUTPATIENT)
Dept: FAMILY MEDICINE CLINIC | Age: 71
End: 2021-05-03
Payer: MEDICARE

## 2021-05-03 VITALS
HEART RATE: 77 BPM | RESPIRATION RATE: 16 BRPM | HEIGHT: 72 IN | WEIGHT: 202 LBS | BODY MASS INDEX: 27.36 KG/M2 | SYSTOLIC BLOOD PRESSURE: 122 MMHG | OXYGEN SATURATION: 99 % | TEMPERATURE: 98.1 F | DIASTOLIC BLOOD PRESSURE: 68 MMHG

## 2021-05-03 DIAGNOSIS — R97.20 HIGH PROSTATE SPECIFIC ANTIGEN (PSA): ICD-10-CM

## 2021-05-03 DIAGNOSIS — E11.9 TYPE 2 DIABETES MELLITUS WITHOUT COMPLICATION, WITHOUT LONG-TERM CURRENT USE OF INSULIN (HCC): Primary | ICD-10-CM

## 2021-05-03 DIAGNOSIS — I10 ESSENTIAL HYPERTENSION: ICD-10-CM

## 2021-05-03 DIAGNOSIS — I26.99 ACUTE PULMONARY EMBOLISM WITHOUT ACUTE COR PULMONALE, UNSPECIFIED PULMONARY EMBOLISM TYPE (HCC): ICD-10-CM

## 2021-05-03 DIAGNOSIS — R53.83 FATIGUE, UNSPECIFIED TYPE: ICD-10-CM

## 2021-05-03 DIAGNOSIS — E78.5 DYSLIPIDEMIA: ICD-10-CM

## 2021-05-03 PROCEDURE — 3052F HG A1C>EQUAL 8.0%<EQUAL 9.0%: CPT | Performed by: FAMILY MEDICINE

## 2021-05-03 PROCEDURE — 2022F DILAT RTA XM EVC RTNOPTHY: CPT | Performed by: FAMILY MEDICINE

## 2021-05-03 PROCEDURE — 4040F PNEUMOC VAC/ADMIN/RCVD: CPT | Performed by: FAMILY MEDICINE

## 2021-05-03 PROCEDURE — 1123F ACP DISCUSS/DSCN MKR DOCD: CPT | Performed by: FAMILY MEDICINE

## 2021-05-03 PROCEDURE — 1036F TOBACCO NON-USER: CPT | Performed by: FAMILY MEDICINE

## 2021-05-03 PROCEDURE — 99214 OFFICE O/P EST MOD 30 MIN: CPT | Performed by: FAMILY MEDICINE

## 2021-05-03 PROCEDURE — 3017F COLORECTAL CA SCREEN DOC REV: CPT | Performed by: FAMILY MEDICINE

## 2021-05-03 PROCEDURE — G8427 DOCREV CUR MEDS BY ELIG CLIN: HCPCS | Performed by: FAMILY MEDICINE

## 2021-05-03 PROCEDURE — G8417 CALC BMI ABV UP PARAM F/U: HCPCS | Performed by: FAMILY MEDICINE

## 2021-05-03 RX ORDER — FOLIC ACID 1 MG/1
1 TABLET ORAL DAILY
Qty: 90 TABLET | Refills: 1 | Status: SHIPPED
Start: 2021-05-03 | End: 2021-09-29

## 2021-05-03 RX ORDER — ROSUVASTATIN CALCIUM 5 MG/1
5 TABLET, COATED ORAL NIGHTLY
Qty: 90 TABLET | Refills: 1 | Status: SHIPPED
Start: 2021-05-03 | End: 2021-09-29

## 2021-05-03 ASSESSMENT — ENCOUNTER SYMPTOMS
ANAL BLEEDING: 0
COUGH: 0
APNEA: 0
PHOTOPHOBIA: 0
FACIAL SWELLING: 0
SINUS PAIN: 0
WHEEZING: 0
ABDOMINAL PAIN: 0
ABDOMINAL DISTENTION: 0
SHORTNESS OF BREATH: 0
RHINORRHEA: 0
STRIDOR: 0
ORTHOPNEA: 0
EYE REDNESS: 0
CHOKING: 0
CONSTIPATION: 0
VOMITING: 0
VOICE CHANGE: 0
RECTAL PAIN: 0
EYE ITCHING: 0
NAUSEA: 0
RESPIRATORY NEGATIVE: 1
EYE PAIN: 0
ALLERGIC/IMMUNOLOGIC NEGATIVE: 1
SINUS PRESSURE: 0
DIARRHEA: 0
BLOOD IN STOOL: 0
VISUAL CHANGE: 1
COLOR CHANGE: 0
SORE THROAT: 0
BLURRED VISION: 0
TROUBLE SWALLOWING: 0
CHEST TIGHTNESS: 0
BACK PAIN: 0
EYE DISCHARGE: 0

## 2021-05-03 NOTE — PROGRESS NOTES
retinopathy. Risk factors for coronary artery disease include male sex, hypertension and diabetes mellitus. Current diabetic treatment includes diet and oral agent (dual therapy). He is compliant with treatment some of the time. He is following a generally unhealthy diet. An ACE inhibitor/angiotensin II receptor blocker is not being taken. ROS:  Review of Systems   Constitutional: Positive for fatigue and malaise/fatigue. Negative for activity change, appetite change, chills, diaphoresis, fever, unexpected weight change and weight loss. HENT: Negative. Negative for congestion, dental problem, drooling, ear discharge, ear pain, facial swelling, hearing loss, mouth sores, nosebleeds, postnasal drip, rhinorrhea, sinus pressure, sinus pain, sneezing, sore throat, tinnitus, trouble swallowing and voice change. Eyes: Negative for blurred vision, photophobia, pain, discharge, redness, itching and visual disturbance. Respiratory: Negative. Negative for apnea, cough, choking, chest tightness, shortness of breath, wheezing and stridor. Cardiovascular: Negative. Negative for chest pain, palpitations, orthopnea, leg swelling and PND. Gastrointestinal: Negative for abdominal distention, abdominal pain, anal bleeding, blood in stool, constipation, diarrhea, nausea, rectal pain and vomiting. Endocrine: Negative. Negative for cold intolerance, heat intolerance, polydipsia, polyphagia and polyuria. Genitourinary: Negative. Negative for decreased urine volume, difficulty urinating, discharge, dysuria, enuresis, flank pain, frequency, genital sores, hematuria, penile pain, penile swelling, scrotal swelling, testicular pain and urgency. Musculoskeletal: Negative. Negative for arthralgias, back pain, gait problem, joint swelling, myalgias, neck pain and neck stiffness. Skin: Negative. Negative for color change, pallor, rash and wound. Allergic/Immunologic: Negative.   Negative for environmental Motor: No weakness or abnormal muscle tone. Coordination: Coordination normal.      Gait: Gait normal.      Deep Tendon Reflexes: Reflexes are normal and symmetric. Reflexes normal.          The ASCVD Risk score (Bre Chavez, et al., 2013) failed to calculate for the following reasons:    Unable to determine if patient is Non-     ASSESSMENT/PLAN  Carla Mention was seen today for results. Diagnoses and all orders for this visit:    Type 2 diabetes mellitus without complication, without long-term current use of insulin (HCC)  -     folic acid (FOLVITE) 1 MG tablet; Take 1 tablet by mouth daily  -     Basic Metabolic Panel; Future  -     CBC Auto Differential; Future  -     Hemoglobin A1C; Future  -     Microalbumin, Ur; Future  Long talk on treatment and prevention  Literature is given     ---VASCULAR PANEL  A) asa, plavix, aggrenox  B) ELIQUIS, pletal, tzd, STATIN  C) ace, hctz, FOLIC, ccb  D) cannikinumab, fish oils     ---CARDIAC---ELIQUIS, ace, beta, STATIN, hctz, ( ccb )      Essential hypertension ---controlled   --patient is instructed on low to moderate sodium ( 2 to 2.5 grams ), daily    Also to increase potassium in the diet to about 3.5 grams daily    Literature is provided     -     Basic Metabolic Panel; Future  -     CBC Auto Differential; Future    Acute pulmonary embolism without acute cor pulmonale, unspecified pulmonary embolism type (HCC)  -     Basic Metabolic Panel; Future  -     CBC Auto Differential; Future    Dyslipidemia  -     rosuvastatin (CRESTOR) 5 MG tablet; Take 1 tablet by mouth nightly  -     Basic Metabolic Panel; Future  -     Lipid Panel; Future  -     CBC Auto Differential; Future  --Mediterranean diet, exercise, weight loss, vitamins    We have a long talk on cholesterol and importance of lowering it       Fatigue, unspecified type  -     Basic Metabolic Panel;  Future  -     CBC Auto Differential; Future  Long talk on treatment and prevention  Literature is given       High prostate specific antigen (PSA)  -     PSA screening; Future        Outpatient Encounter Medications as of 5/3/2021   Medication Sig Dispense Refill    rosuvastatin (CRESTOR) 5 MG tablet Take 1 tablet by mouth nightly 90 tablet 1    folic acid (FOLVITE) 1 MG tablet Take 1 tablet by mouth daily 90 tablet 1    blood glucose test strips (ACCU-CHEK GUIDE) strip TEST 2 TIMES A DAY & AS NEEDED FOR SYMPTOMS OF IRREGULAR BLOOD GLUCOSE. 100 strip 3    famotidine (PEPCID) 20 MG tablet Take 1 tablet by mouth 2 times daily 60 tablet 11    glipiZIDE (GLUCOTROL) 5 MG tablet Take 1 tablet by mouth daily 90 tablet 1    metFORMIN (GLUCOPHAGE) 500 MG tablet Take 1 tablet by mouth 2 times daily (with meals) 180 tablet 1    Cholecalciferol (VITAMIN D3) 25 MCG TABS Take 1 tablet by mouth daily 90 tablet 1    apixaban (ELIQUIS) 5 MG TABS tablet Take 1 tablet by mouth 2 times daily 180 tablet 1    glucose monitoring kit (FREESTYLE) monitoring kit 1 kit by Does not apply route daily 1 kit 0    Lancets MISC 1 each by Does not apply route daily 100 each 3    hydrocortisone (ANUSOL-HC) 2.5 % CREA rectal cream Apply bid 28 g 3     No facility-administered encounter medications on file as of 5/3/2021. No follow-ups on file.         Reviewed recent labs related to Luteresa's current problems      Discussed importance of regular Health Maintenance follow up  Health Maintenance   Topic    AAA screen     Hepatitis C screen     Diabetic foot exam     Diabetic retinal exam     DTaP/Tdap/Td vaccine (1 - Tdap)    Shingles Vaccine (1 of 2)    Pneumococcal 65+ years Vaccine (1 of 1 - PPSV23)    COVID-19 Vaccine (2 - Moderna 2-dose series)    Flu vaccine (Season Ended)    A1C test (Diabetic or Prediabetic)     Diabetic microalbuminuria test     Lipid screen     Potassium monitoring     Creatinine monitoring     PSA counseling     Annual Wellness Visit (AWV)     Colon cancer screen colonoscopy     Hepatitis A vaccine     Hib vaccine     Meningococcal (ACWY) vaccine

## 2021-05-03 NOTE — PATIENT INSTRUCTIONS

## 2021-05-10 ENCOUNTER — IMMUNIZATION (OUTPATIENT)
Dept: PRIMARY CARE CLINIC | Age: 71
End: 2021-05-10
Payer: MEDICARE

## 2021-05-10 PROCEDURE — 91301 COVID-19, MODERNA VACCINE 100MCG/0.5ML DOSE: CPT | Performed by: PHYSICIAN ASSISTANT

## 2021-05-10 PROCEDURE — 0012A COVID-19, MODERNA VACCINE 100MCG/0.5ML DOSE: CPT | Performed by: PHYSICIAN ASSISTANT

## 2021-05-11 ENCOUNTER — FOLLOWUP TELEPHONE ENCOUNTER (OUTPATIENT)
Dept: DIABETES SERVICES | Age: 71
End: 2021-05-11

## 2021-05-11 NOTE — LETTER
[facility] Diabetes Education DSMES Follow-up Letter    Name: Nery Smith  :   1950    Follow-up plan/Date:   2021               Nola Ho     Dear Dr Herson Elizabeth; Thank you for referring  Nery Smith  to [facility] Diabetes Education Services. Nery Smith  has completed their personalized comprehensive education plan. The education plan included the following topics: Diabetes Disease Process, Nutrition, Exercise, Glucose Monitoring, Acute and Chronic Complication, Behavioral and Lifestyle Change, Healthy Coping and goal setting. We contact participants 3 months after attending our services to review their progress on their chosen goal.      The following area was chosen: [] Healthy Eating   [] Being Active  [x] Monitoring [] Problem Solving [] Taking Medication [] Healthy Coping  []Reducing Risks    Selected goal outcome Post Education:     Patient states they met their goal 75 % of time. Thank you for referring this patient to our program. Please do not hesitate to call if we can be of any service for this patient.      Keara Paez or Kendra Oliveros: Cathy    [facility] Diabetes Education Department  American [employer] Association Recognized [facility] Program

## 2021-05-11 NOTE — PROGRESS NOTES
Diabetes Self-Management Education Record    Participant Name: Anna Birch  Referring Provider: No Patel DO  Assessment/Evaluation Ratings:  1=Needs Instruction   4=Demonstrates Understanding/Competency  2=Needs Review   NC=Not Covered    3=Comprehends Key Points  N/A=Not Applicable  Topics/Learning Objectives Pre-session Assess Date:  Instructor initials/date  2/2/21 CS Instr. Date    Instructor initials/date  2/2/21 CS Follow-up Post- session Eval Comments   Diabetes disease process & Treatment process:   -Define type of diabetes in simple terms.  - Describe the ABCs of  diabetes management  -Identify own type of diabetes  -Identify lifestyle changes/treatment options  -other:  2 [x] All     []  []  []  []  []  4 Type 2  Pt states dx before 2000  Pt states recently had COVID- states that he lost weight (currently 183#, usually weighs 192-197#) and that blood glucose numbers were higher   Developing strategies for Healthy coping/psychosocial issues:    -Describe feelings about living with diabetes  -Identify coping strategies and sources of stress  -Identify support needed & support network available  -Complete PAID-5 Diabetes questionnaire 2 [x] All     []  []  []    []  4 Anna Birch completed a Diabetes Self- Management Education Assessment on 2/2/21. Part of our assessment is having the patient complete the PAID (Problem Areas in Diabetes Scale)-5 survey. This tool  measures diabetes-related emotional distress a patient may be feeling. Anna Birch scored _0_   A total score of >8 indicates possible diabetes related emotional distress, which warrants further assessment and a referral to mental health professional for psychological support and treatment.            Prevention, detection & treatment of Chronic complications:    -Identify the prevention, detection and treatment for complications including immunizations, preventive eye, foot, dental and renal exams as indicated per the participant's duration of diabetes and health status.  -Define the natural course of diabetes and the relationship of blood glucose levels to long term complications of diabetes. 2 [x] All     []            []   4   Prevention, detection & treatment of acute complications:     -State the causes,signs & symptoms of hyper & hypoglycemia, and prevention & treatment strategies.   -Describe sick day guidelines  DKA /indications for ketone testing &  when to call physician  2 [x] All     []      []    4     2/2/21 CS Pt states no episode of hypoglycemia.         -Identify severe weather/situation crisis  & diabetes supplies management  []      Using medications safely:   -State effects of diabetes medicines on blood glucose levels;  -List diabetes medication taken, action & side effects 2 [x] All     []  []  4 2/2/21 CS   Metformin BID  Glipizide 5 mg daily   Insulin/Injectables/glucagon  -Name appropriate injection sites; proper storage; supplies needed;     []       Demonstrate proper technique  []      Monitoring blood glucose, interpreting and using results:   -Identify the purpose of testing   -Identify recommended & personal blood glucose targets & HgbA1C target levels  -State the Importance of logging blood glucose levels for pattern recognition;   -State benefits of reading/using pt generated health data  -Verbalize safe lancet disposal 2 [x] All     []  []    []  []  []  4 Pt states that he tests every morning before breakfast.  A1C 8.7% 12/27/2020   -Demonstrate proper testing technique  []      Incorporating physical activity into lifestyle:   -State effect of exercise on blood glucose levels;   -State benefits of regular exercise;   -Define safety considerations/food choices if needed.  -Describe contraindications/maintenance of activity. 2 [x] All     []  []    []  []  4 Pt states that he is usually physically active with walking 6 days/week.    However, Pt states that he is currently recovering from Covid and is working to get his strength back. Incorporating nutritional management into lifestyle:   -Describe effect of type, amount & timing of food on blood glucose  -Describe methods for preparing and planning healthy meals  -Correctly read food labels  -Name 3 foods high in Carbohydrate 2 [x] All       []    []    []  []  4 Pt is able to name carbohydrate foods and serving sizes. 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 Pt's food recall  reveals that he does follow Diabetic Plate method for most meals.    -Plan a carbohydrate-controlled meal based on individualized meal plan  -Demonstrate CHO counting/portion control  2 []  []  4 Instructed patient on Diabetic Plate method with the guidelines of 4 carbohydrate choices at each meal and 2 carbohydrate choice for snack. Developing strategies for problem solving to promote health/change behavior. -Identify 7 self-care behaviors; Personal health risk factors; Benefits, challenges & strategies for behavioral change and set an individualized goal selection. 2       []  4 2/2/21 CS  []Nutrition  []Monitoring   []Exercise  []Medication  [x]Other wants to improve A1C     Identified Barriers to learning/adherence to self management plan:    None  []  other    Instruction Method:  Lecture/Discussion and Handouts    Supporting Education Materials/Equipment Provided: Educational Binder, Meal Plan and Nutritional Packet   []Nepali materials       [] services     []Other:      Encounter Type Date Attended Start Time End Time Comments No Show Dates   Assessment 2/2/21  1000   telehealth    Session 1         Session 2        1:1 DSMES  2/2/21 CS 1000 1025  telehealth    In person Follow-up         Gestational Diabetes         Individual MNT        Meter Instrx        Insulin Instrx           Additional Comments: [x] Pt seen individually due to Covid-19 Safety precautions and no group session available.         Date:

## 2021-05-18 ENCOUNTER — NURSE ONLY (OUTPATIENT)
Dept: FAMILY MEDICINE CLINIC | Age: 71
End: 2021-05-18

## 2021-05-18 DIAGNOSIS — R97.20 HIGH PROSTATE SPECIFIC ANTIGEN (PSA): ICD-10-CM

## 2021-05-18 DIAGNOSIS — I10 ESSENTIAL HYPERTENSION: ICD-10-CM

## 2021-05-18 DIAGNOSIS — E11.9 TYPE 2 DIABETES MELLITUS WITHOUT COMPLICATION, WITHOUT LONG-TERM CURRENT USE OF INSULIN (HCC): ICD-10-CM

## 2021-05-18 DIAGNOSIS — E78.5 DYSLIPIDEMIA: ICD-10-CM

## 2021-05-18 DIAGNOSIS — I26.99 ACUTE PULMONARY EMBOLISM WITHOUT ACUTE COR PULMONALE, UNSPECIFIED PULMONARY EMBOLISM TYPE (HCC): ICD-10-CM

## 2021-05-18 DIAGNOSIS — R53.83 FATIGUE, UNSPECIFIED TYPE: ICD-10-CM

## 2021-05-18 LAB
ANION GAP SERPL CALCULATED.3IONS-SCNC: 14 MMOL/L (ref 7–16)
BASOPHILS ABSOLUTE: 0.09 E9/L (ref 0–0.2)
BASOPHILS RELATIVE PERCENT: 0.9 % (ref 0–2)
BUN BLDV-MCNC: 15 MG/DL (ref 6–23)
CALCIUM SERPL-MCNC: 9.4 MG/DL (ref 8.6–10.2)
CHLORIDE BLD-SCNC: 103 MMOL/L (ref 98–107)
CHOLESTEROL, TOTAL: 144 MG/DL (ref 0–199)
CO2: 24 MMOL/L (ref 22–29)
CREAT SERPL-MCNC: 1.2 MG/DL (ref 0.7–1.2)
EOSINOPHILS ABSOLUTE: 0.18 E9/L (ref 0.05–0.5)
EOSINOPHILS RELATIVE PERCENT: 1.7 % (ref 0–6)
GFR AFRICAN AMERICAN: >60
GFR NON-AFRICAN AMERICAN: 59 ML/MIN/1.73
GLUCOSE BLD-MCNC: 153 MG/DL (ref 74–99)
HBA1C MFR BLD: 8.6 % (ref 4–5.6)
HCT VFR BLD CALC: 39 % (ref 37–54)
HDLC SERPL-MCNC: 38 MG/DL
HEMOGLOBIN: 12 G/DL (ref 12.5–16.5)
IMMATURE GRANULOCYTES #: 0.06 E9/L
IMMATURE GRANULOCYTES %: 0.6 % (ref 0–5)
LDL CHOLESTEROL CALCULATED: 83 MG/DL (ref 0–99)
LYMPHOCYTES ABSOLUTE: 3.38 E9/L (ref 1.5–4)
LYMPHOCYTES RELATIVE PERCENT: 32.2 % (ref 20–42)
MCH RBC QN AUTO: 24 PG (ref 26–35)
MCHC RBC AUTO-ENTMCNC: 30.8 % (ref 32–34.5)
MCV RBC AUTO: 77.8 FL (ref 80–99.9)
MICROALBUMIN UR-MCNC: <12 MG/L
MONOCYTES ABSOLUTE: 0.83 E9/L (ref 0.1–0.95)
MONOCYTES RELATIVE PERCENT: 7.9 % (ref 2–12)
NEUTROPHILS ABSOLUTE: 5.96 E9/L (ref 1.8–7.3)
NEUTROPHILS RELATIVE PERCENT: 56.7 % (ref 43–80)
PDW BLD-RTO: 15.6 FL (ref 11.5–15)
PLATELET # BLD: 367 E9/L (ref 130–450)
PMV BLD AUTO: 10.7 FL (ref 7–12)
POTASSIUM SERPL-SCNC: 4.2 MMOL/L (ref 3.5–5)
PROSTATE SPECIFIC ANTIGEN: 4.76 NG/ML (ref 0–4)
RBC # BLD: 5.01 E12/L (ref 3.8–5.8)
SODIUM BLD-SCNC: 141 MMOL/L (ref 132–146)
TRIGL SERPL-MCNC: 114 MG/DL (ref 0–149)
VLDLC SERPL CALC-MCNC: 23 MG/DL
WBC # BLD: 10.5 E9/L (ref 4.5–11.5)

## 2021-05-19 ENCOUNTER — TELEPHONE (OUTPATIENT)
Dept: SLEEP MEDICINE | Age: 71
End: 2021-05-19

## 2021-05-27 LAB — DIABETIC RETINOPATHY: NEGATIVE

## 2021-05-28 ENCOUNTER — OFFICE VISIT (OUTPATIENT)
Dept: FAMILY MEDICINE CLINIC | Age: 71
End: 2021-05-28
Payer: MEDICARE

## 2021-05-28 VITALS
OXYGEN SATURATION: 98 % | BODY MASS INDEX: 27.22 KG/M2 | HEART RATE: 67 BPM | WEIGHT: 201 LBS | SYSTOLIC BLOOD PRESSURE: 112 MMHG | TEMPERATURE: 98 F | DIASTOLIC BLOOD PRESSURE: 78 MMHG | HEIGHT: 72 IN | RESPIRATION RATE: 18 BRPM

## 2021-05-28 DIAGNOSIS — I10 ESSENTIAL HYPERTENSION: ICD-10-CM

## 2021-05-28 DIAGNOSIS — J12.82 PNEUMONIA DUE TO COVID-19 VIRUS: ICD-10-CM

## 2021-05-28 DIAGNOSIS — U07.1 PNEUMONIA DUE TO COVID-19 VIRUS: ICD-10-CM

## 2021-05-28 DIAGNOSIS — I26.99 ACUTE PULMONARY EMBOLISM WITHOUT ACUTE COR PULMONALE, UNSPECIFIED PULMONARY EMBOLISM TYPE (HCC): Primary | ICD-10-CM

## 2021-05-28 DIAGNOSIS — G47.33 OSA (OBSTRUCTIVE SLEEP APNEA): ICD-10-CM

## 2021-05-28 DIAGNOSIS — E11.9 TYPE 2 DIABETES MELLITUS WITHOUT COMPLICATION, WITHOUT LONG-TERM CURRENT USE OF INSULIN (HCC): ICD-10-CM

## 2021-05-28 PROCEDURE — 4040F PNEUMOC VAC/ADMIN/RCVD: CPT | Performed by: FAMILY MEDICINE

## 2021-05-28 PROCEDURE — 3052F HG A1C>EQUAL 8.0%<EQUAL 9.0%: CPT | Performed by: FAMILY MEDICINE

## 2021-05-28 PROCEDURE — 99214 OFFICE O/P EST MOD 30 MIN: CPT | Performed by: FAMILY MEDICINE

## 2021-05-28 PROCEDURE — G8417 CALC BMI ABV UP PARAM F/U: HCPCS | Performed by: FAMILY MEDICINE

## 2021-05-28 PROCEDURE — 2022F DILAT RTA XM EVC RTNOPTHY: CPT | Performed by: FAMILY MEDICINE

## 2021-05-28 PROCEDURE — 3017F COLORECTAL CA SCREEN DOC REV: CPT | Performed by: FAMILY MEDICINE

## 2021-05-28 PROCEDURE — G8427 DOCREV CUR MEDS BY ELIG CLIN: HCPCS | Performed by: FAMILY MEDICINE

## 2021-05-28 PROCEDURE — 1123F ACP DISCUSS/DSCN MKR DOCD: CPT | Performed by: FAMILY MEDICINE

## 2021-05-28 PROCEDURE — 1036F TOBACCO NON-USER: CPT | Performed by: FAMILY MEDICINE

## 2021-05-28 RX ORDER — FERROUS SULFATE 325(65) MG
325 TABLET ORAL
Qty: 90 TABLET | Refills: 1 | Status: SHIPPED
Start: 2021-05-28 | End: 2021-08-25

## 2021-05-28 ASSESSMENT — ENCOUNTER SYMPTOMS
SORE THROAT: 0
WHEEZING: 0
CHEST TIGHTNESS: 0
APNEA: 0
BLOOD IN STOOL: 0
ABDOMINAL DISTENTION: 0
BLURRED VISION: 0
EYE REDNESS: 0
STRIDOR: 0
VOICE CHANGE: 0
RECTAL PAIN: 0
NAUSEA: 0
PHOTOPHOBIA: 0
COUGH: 0
SHORTNESS OF BREATH: 0
EYE ITCHING: 0
EYE PAIN: 0
TROUBLE SWALLOWING: 0
EYE DISCHARGE: 0
SINUS PAIN: 0
CHOKING: 0
FACIAL SWELLING: 0
VISUAL CHANGE: 1
ORTHOPNEA: 0
ABDOMINAL PAIN: 0
SINUS PRESSURE: 0
CONSTIPATION: 0
ANAL BLEEDING: 0
RESPIRATORY NEGATIVE: 1
RHINORRHEA: 0
ALLERGIC/IMMUNOLOGIC NEGATIVE: 1
BACK PAIN: 0
VOMITING: 0
COLOR CHANGE: 0
DIARRHEA: 0

## 2021-05-28 NOTE — PATIENT INSTRUCTIONS
Patient Education        Learning About Meal Planning for Diabetes  Why plan your meals? Meal planning can be a key part of managing diabetes. Planning meals and snacks with the right balance of carbohydrate, protein, and fat can help you keep your blood sugar at the target level you set with your doctor. You don't have to eat special foods. You can eat what your family eats, including sweets once in a while. But you do have to pay attention to how often you eat and how much you eat of certain foods. You may want to work with a dietitian or a certified diabetes educator. He or she can give you tips and meal ideas and can answer your questions about meal planning. This health professional can also help you reach a healthy weight if that is one of your goals. What plan is right for you? Your dietitian or diabetes educator may suggest that you start with the plate format or carbohydrate counting. The plate format  The plate format is a simple way to help you manage how you eat. You plan meals by learning how much space each food should take on a plate. Using the plate format helps you spread carbohydrate throughout the day. It can make it easier to keep your blood sugar level within your target range. It also helps you see if you're eating healthy portion sizes. To use the plate format, you put non-starchy vegetables on half your plate. Add meat or meat substitutes on one-quarter of the plate. Put a grain or starchy vegetable (such as brown rice or a potato) on the final quarter of the plate. You can add a small piece of fruit and some low-fat or fat-free milk or yogurt, depending on your carbohydrate goal for each meal.  Here are some tips for using the plate format:  · Make sure that you are not using an oversized plate. A 9-inch plate is best. Many restaurants use larger plates. · Get used to using the plate format at home. Then you can use it when you eat out.   · Write down your questions about using rapid-acting insulin to take before you eat. If you use an insulin pump, you get a constant rate of insulin during the day. So the pump must be programmed at meals to give you extra insulin to cover the rise in blood sugar after meals. When you know how much carbohydrate you will eat, you can take the right amount of insulin. Or, if you always use the same amount of insulin, you need to make sure that you eat the same amount of carbohydrate at meals. If you need more help to understand carbohydrate counting and food labels, ask your doctor, dietitian, or diabetes educator. How can you plan healthy meals? Here are some tips to get started:  · Plan your meals a week at a time. Don't forget to include snacks too. · Use cookbooks or online recipes to plan several main meals. Plan some quick meals for busy nights. You also can double some recipes that freeze well. Then you can save half for other busy nights when you don't have time to cook. · Make sure you have the ingredients you need for your recipes. If you're running low on basic items, put these items on your shopping list too. · List foods that you use to make breakfasts, lunches, and snacks. List plenty of fruits and vegetables. · Post this list on the refrigerator. Add to it as you think of more things you need. · Take the list to the store to do your weekly shopping. Follow-up care is a key part of your treatment and safety. Be sure to make and go to all appointments, and call your doctor if you are having problems. It's also a good idea to know your test results and keep a list of the medicines you take. Where can you learn more? Go to https://tonya.[employer]. org and sign in to your [employer] account. Enter G124 in the Filtr[employer] box to learn more about \"Learning About Meal Planning for Diabetes. \"     If you do not have an account, please click on the \"Sign Up Now\" link.   Current as of: August 31, 2020               Content

## 2021-05-28 NOTE — PROGRESS NOTES
Meme Parada is a 66 y.o. male. HPI/Chief C/O:  Chief Complaint   Patient presents with    Discuss Labs     Pt here to review lab results     No Known Allergies  The patient is here for a medication list and treatment planning review  We will go over our care planning goals as well as take care of all refills  We will set up labs as well   He is still very weak     Hypertension  Associated symptoms include malaise/fatigue. Pertinent negatives include no anxiety, blurred vision, chest pain, headaches, neck pain, orthopnea, palpitations, peripheral edema, PND, shortness of breath or sweats. Risk factors for coronary artery disease include male gender, diabetes mellitus and dyslipidemia. Past treatments include lifestyle changes. The current treatment provides significant improvement. Compliance problems include diet and exercise. There is no history of angina, kidney disease, CAD/MI, CVA, heart failure, left ventricular hypertrophy, PVD (H/O PE post COVID\) or retinopathy. There is no history of chronic renal disease, coarctation of the aorta, hyperaldosteronism, hypercortisolism, hyperparathyroidism, a hypertension causing med, pheochromocytoma, renovascular disease, sleep apnea or a thyroid problem. Diabetes  He presents for his follow-up diabetic visit. He has type 2 diabetes mellitus. Pertinent negatives for hypoglycemia include no confusion, dizziness, headaches, nervousness/anxiousness, pallor, seizures, speech difficulty, sweats or tremors. Associated symptoms include fatigue and visual change. Pertinent negatives for diabetes include no blurred vision, no chest pain, no foot paresthesias, no foot ulcerations, no polydipsia, no polyphagia, no polyuria, no weakness and no weight loss. There are no hypoglycemic complications.  Pertinent negatives for diabetic complications include no autonomic neuropathy, CVA, heart disease, nephropathy, peripheral neuropathy, PVD (H/O PE post COVID\) or retinopathy. Risk factors for coronary artery disease include male sex, hypertension and diabetes mellitus. Current diabetic treatment includes diet and oral agent (dual therapy). He is compliant with treatment some of the time. He is following a generally unhealthy diet. An ACE inhibitor/angiotensin II receptor blocker is not being taken. ROS:  Review of Systems   Constitutional: Positive for fatigue and malaise/fatigue. Negative for activity change, appetite change, chills, diaphoresis, fever, unexpected weight change and weight loss. HENT: Negative. Negative for congestion, dental problem, drooling, ear discharge, ear pain, facial swelling, hearing loss, mouth sores, nosebleeds, postnasal drip, rhinorrhea, sinus pressure, sinus pain, sneezing, sore throat, tinnitus, trouble swallowing and voice change. Eyes: Negative for blurred vision, photophobia, pain, discharge, redness, itching and visual disturbance. Respiratory: Negative. Negative for apnea, cough, choking, chest tightness, shortness of breath, wheezing and stridor. Cardiovascular: Negative. Negative for chest pain, palpitations, orthopnea, leg swelling and PND. Gastrointestinal: Negative for abdominal distention, abdominal pain, anal bleeding, blood in stool, constipation, diarrhea, nausea, rectal pain and vomiting. Endocrine: Negative. Negative for cold intolerance, heat intolerance, polydipsia, polyphagia and polyuria. Genitourinary: Negative. Negative for decreased urine volume, difficulty urinating, discharge, dysuria, enuresis, flank pain, frequency, genital sores, hematuria, penile pain, penile swelling, scrotal swelling, testicular pain and urgency. Musculoskeletal: Negative. Negative for arthralgias, back pain, gait problem, joint swelling, myalgias, neck pain and neck stiffness. Skin: Negative. Negative for color change, pallor, rash and wound. Allergic/Immunologic: Negative.   Negative for environmental allergies, food allergies and immunocompromised state. Neurological: Negative for dizziness, tremors, seizures, syncope, facial asymmetry, speech difficulty, weakness, light-headedness, numbness and headaches. Hematological: Negative. Negative for adenopathy. Does not bruise/bleed easily. Psychiatric/Behavioral: Negative. Negative for agitation, behavioral problems, confusion, decreased concentration, dysphoric mood, hallucinations, self-injury, sleep disturbance and suicidal ideas. The patient is not nervous/anxious and is not hyperactive. Past Medical/Surgical Hx;  Reviewed with patient      Diagnosis Date    Diabetes mellitus ([facility] [address]      Past Surgical History:   Procedure Laterality Date    COLONOSCOPY N/A 2021    COLONOSCOPY WITH BIOPSY performed by Fletcher Skiff, MD at [address] [place] [address] N/A 2021    EGD BIOPSY performed by Fletcher Skiff, MD at [facility] ENDOSCOPY       Past Family Hx:  Reviewed with patient  History reviewed. No pertinent family history. Social Hx:  Reviewed with patient  Social History     Tobacco Use    Smoking status: Former Smoker     Packs/day: 1.50     Years: 25.00     Pack years: 37.50     Types: Cigarettes     Start date: 1961     Quit date: 1986     Years since quittin.4    Smokeless tobacco: Never Used   Substance Use Topics    Alcohol use: Not Currently       OBJECTIVE  /78   Pulse 67   Temp 98 °F (36.7 °C) (Temporal)   Resp 18   Ht 6' (1.829 m)   Wt 201 lb (91.2 kg)   SpO2 98%   BMI 27.26 kg/m²     Problem List:  Patsy Cooley does not have any pertinent problems on file. PHYS EX:  Physical Exam  Vitals and nursing note reviewed. Constitutional:       General: He is not in acute distress. Appearance: Normal appearance. He is well-developed. He is not ill-appearing, toxic-appearing or diaphoretic. HENT:      Head: Normocephalic and atraumatic.       Right Ear: External ear normal. There is no impacted cerumen. Left Ear: External ear normal. There is no impacted cerumen. Nose: Nose normal. No congestion or rhinorrhea. Mouth/Throat:      Mouth: Mucous membranes are moist.      Pharynx: Oropharynx is clear. No oropharyngeal exudate or posterior oropharyngeal erythema. Eyes:      General: No scleral icterus. Right eye: No discharge. Left eye: No discharge. Neck:      Thyroid: No thyromegaly. Vascular: No carotid bruit. Trachea: No tracheal deviation. Cardiovascular:      Rate and Rhythm: Normal rate and regular rhythm. Pulses: Normal pulses. Heart sounds: Normal heart sounds. No murmur heard. No friction rub. No gallop. Pulmonary:      Effort: Pulmonary effort is normal. No respiratory distress. Breath sounds: Normal breath sounds. No stridor. No wheezing, rhonchi or rales. Chest:      Chest wall: No tenderness. Abdominal:      General: Bowel sounds are normal. There is no distension. Palpations: Abdomen is soft. There is no mass. Tenderness: There is no abdominal tenderness. There is no right CVA tenderness, left CVA tenderness, guarding or rebound. Hernia: No hernia is present. Genitourinary:     Penis: No tenderness. Comments: Bleeding hemorrhoids   Musculoskeletal:         General: No swelling, tenderness, deformity or signs of injury. Normal range of motion. Cervical back: Normal range of motion and neck supple. No rigidity. No muscular tenderness. Right lower leg: No edema. Left lower leg: No edema. Lymphadenopathy:      Cervical: No cervical adenopathy. Skin:     General: Skin is warm. Coloration: Skin is not jaundiced or pale. Findings: No bruising, erythema, lesion or rash. Neurological:      General: No focal deficit present. Mental Status: He is alert and oriented to person, place, and time. Cranial Nerves: No cranial nerve deficit.       Sensory: No sensory deficit. Motor: No weakness or abnormal muscle tone. Coordination: Coordination normal.      Gait: Gait normal.      Deep Tendon Reflexes: Reflexes are normal and symmetric. Reflexes normal.         ASSESSMENT/PLAN  Julian was seen today for discuss labs.     Diagnoses and all orders for this visit:    Acute pulmonary embolism without acute cor pulmonale, unspecified pulmonary embolism type (HCC)  --stable on current care planning  -- continue treatment as we are meeting goals   Long talk on treatment and prevention  Literature is given       Type 2 diabetes mellitus without complication, without long-term current use of insulin ([facility] [address]  Long talk on treatment and prevention  Literature is given       CUCA (obstructive sleep apnea)  --PLAN--aerosol accuneb 1.25 plus chest percussion--Rx      Essential hypertension ---controlled     ---VASCULAR PANEL  A) asa, plavix, aggrenox  B) ELIQUIS, pletal, tzd, STATIN  C) ace, hctz, FOLIC, ccb  D) cannikinumab, fish oils     ---CARDIAC---ELIQUIS, ace, beta, STATIN, hctz, ( ccb )    Pneumonia due to COVID-19 virus  Long talk on treatment and prevention  Literature is given   --resolved         Outpatient Encounter Medications as of 5/28/2021   Medication Sig Dispense Refill    ferrous sulfate (IRON 325) 325 (65 Fe) MG tablet Take 1 tablet by mouth daily (with breakfast) 90 tablet 1    rosuvastatin (CRESTOR) 5 MG tablet Take 1 tablet by mouth nightly 90 tablet 1    folic acid (FOLVITE) 1 MG tablet Take 1 tablet by mouth daily 90 tablet 1    blood glucose test strips (ACCU-CHEK GUIDE) strip TEST 2 TIMES A DAY & AS NEEDED FOR SYMPTOMS OF IRREGULAR BLOOD GLUCOSE. 100 strip 3    famotidine (PEPCID) 20 MG tablet Take 1 tablet by mouth 2 times daily 60 tablet 11    glipiZIDE (GLUCOTROL) 5 MG tablet Take 1 tablet by mouth daily 90 tablet 1    metFORMIN (GLUCOPHAGE) 500 MG tablet Take 1 tablet by mouth 2 times daily (with meals) 180 tablet 1    Cholecalciferol (VITAMIN D3) 25 MCG TABS Take 1 tablet by mouth daily 90 tablet 1    apixaban (ELIQUIS) 5 MG TABS tablet Take 1 tablet by mouth 2 times daily 180 tablet 1    glucose monitoring kit (FREESTYLE) monitoring kit 1 kit by Does not apply route daily 1 kit 0    Lancets MISC 1 each by Does not apply route daily 100 each 3    hydrocortisone (ANUSOL-HC) 2.5 % CREA rectal cream Apply bid 28 g 3     No facility-administered encounter medications on file as of 5/28/2021. Return in about 4 weeks (around 6/25/2021).         Reviewed recent labs related to Julian's current problems      Discussed importance of regular Health Maintenance follow up  Health Maintenance   Topic    AAA screen     Hepatitis C screen     Diabetic foot exam     DTaP/Tdap/Td vaccine (1 - Tdap)    Shingles Vaccine (1 of 2)    Pneumococcal 65+ years Vaccine (1 of 1 - PPSV23)    Flu vaccine (Season Ended)    Annual Wellness Visit (AWV)     A1C test (Diabetic or Prediabetic)     Diabetic microalbuminuria test     Lipid screen     Potassium monitoring     Creatinine monitoring     PSA counseling     Diabetic retinal exam     Colon cancer screen colonoscopy     COVID-19 Vaccine     Hepatitis A vaccine     Hib vaccine     Meningococcal (ACWY) vaccine

## 2021-06-01 ENCOUNTER — TELEPHONE (OUTPATIENT)
Dept: FAMILY MEDICINE CLINIC | Age: 71
End: 2021-06-01

## 2021-06-01 DIAGNOSIS — D64.9 ANEMIA, UNSPECIFIED TYPE: Primary | ICD-10-CM

## 2021-06-23 ENCOUNTER — OFFICE VISIT (OUTPATIENT)
Dept: SLEEP MEDICINE | Age: 71
End: 2021-06-23
Payer: MEDICARE

## 2021-06-23 ENCOUNTER — TELEPHONE (OUTPATIENT)
Dept: SLEEP MEDICINE | Age: 71
End: 2021-06-23

## 2021-06-23 VITALS
BODY MASS INDEX: 27.59 KG/M2 | HEIGHT: 72 IN | OXYGEN SATURATION: 98 % | HEART RATE: 69 BPM | DIASTOLIC BLOOD PRESSURE: 76 MMHG | RESPIRATION RATE: 16 BRPM | SYSTOLIC BLOOD PRESSURE: 122 MMHG | WEIGHT: 203.7 LBS

## 2021-06-23 DIAGNOSIS — Z72.821 POOR SLEEP HYGIENE: ICD-10-CM

## 2021-06-23 DIAGNOSIS — G47.33 OBSTRUCTIVE SLEEP APNEA: Primary | ICD-10-CM

## 2021-06-23 DIAGNOSIS — I26.99 ACUTE PULMONARY EMBOLISM, UNSPECIFIED PULMONARY EMBOLISM TYPE, UNSPECIFIED WHETHER ACUTE COR PULMONALE PRESENT (HCC): ICD-10-CM

## 2021-06-23 DIAGNOSIS — E66.3 OVERWEIGHT: ICD-10-CM

## 2021-06-23 PROCEDURE — G8427 DOCREV CUR MEDS BY ELIG CLIN: HCPCS | Performed by: STUDENT IN AN ORGANIZED HEALTH CARE EDUCATION/TRAINING PROGRAM

## 2021-06-23 PROCEDURE — G8417 CALC BMI ABV UP PARAM F/U: HCPCS | Performed by: STUDENT IN AN ORGANIZED HEALTH CARE EDUCATION/TRAINING PROGRAM

## 2021-06-23 PROCEDURE — 1036F TOBACCO NON-USER: CPT | Performed by: STUDENT IN AN ORGANIZED HEALTH CARE EDUCATION/TRAINING PROGRAM

## 2021-06-23 PROCEDURE — 4040F PNEUMOC VAC/ADMIN/RCVD: CPT | Performed by: STUDENT IN AN ORGANIZED HEALTH CARE EDUCATION/TRAINING PROGRAM

## 2021-06-23 PROCEDURE — 99204 OFFICE O/P NEW MOD 45 MIN: CPT | Performed by: STUDENT IN AN ORGANIZED HEALTH CARE EDUCATION/TRAINING PROGRAM

## 2021-06-23 PROCEDURE — 1123F ACP DISCUSS/DSCN MKR DOCD: CPT | Performed by: STUDENT IN AN ORGANIZED HEALTH CARE EDUCATION/TRAINING PROGRAM

## 2021-06-23 PROCEDURE — 3017F COLORECTAL CA SCREEN DOC REV: CPT | Performed by: STUDENT IN AN ORGANIZED HEALTH CARE EDUCATION/TRAINING PROGRAM

## 2021-06-23 RX ORDER — APIXABAN 5 MG/1
TABLET, FILM COATED ORAL
Qty: 180 TABLET | Refills: 1 | Status: SHIPPED
Start: 2021-06-23 | End: 2021-06-25

## 2021-06-23 ASSESSMENT — SLEEP AND FATIGUE QUESTIONNAIRES
HOW LIKELY ARE YOU TO NOD OFF OR FALL ASLEEP WHILE SITTING AND TALKING TO SOMEONE: 0
HOW LIKELY ARE YOU TO NOD OFF OR FALL ASLEEP WHILE LYING DOWN TO REST IN THE AFTERNOON WHEN CIRCUMSTANCES PERMIT: 1
HOW LIKELY ARE YOU TO NOD OFF OR FALL ASLEEP WHILE WATCHING TV: 3
HOW LIKELY ARE YOU TO NOD OFF OR FALL ASLEEP WHILE SITTING QUIETLY AFTER LUNCH WITHOUT ALCOHOL: 3
HOW LIKELY ARE YOU TO NOD OFF OR FALL ASLEEP WHILE SITTING AND READING: 0
HOW LIKELY ARE YOU TO NOD OFF OR FALL ASLEEP IN A CAR, WHILE STOPPED FOR A FEW MINUTES IN TRAFFIC: 0
HOW LIKELY ARE YOU TO NOD OFF OR FALL ASLEEP WHEN YOU ARE A PASSENGER IN A CAR FOR AN HOUR WITHOUT A BREAK: 0
HOW LIKELY ARE YOU TO NOD OFF OR FALL ASLEEP WHILE SITTING INACTIVE IN A PUBLIC PLACE: 0
ESS TOTAL SCORE: 7

## 2021-06-23 NOTE — PROGRESS NOTES
[facility] Sleep Medicine    Patient Name: Linda Rivero  Age: 66 y.o.   : 1950    Date of Visit: 21    HPI   Linda Rivero is a 66 y.o. male with  has a past medical history of Diabetes mellitus ([facility] [address] who presents as a new patient to Sleep Clinic, referred by Dr. Alejandrina Cooper, for Sleep Apnea (SS results)     Patient presents today to review home sleep test results that was ordered by his PCP for progressive increase in daytime sleepiness and fatigue. He completed the home sleep test on 2021 which showed moderate sleep apnea (AHI 24.7, SpO2 aníbal 80%). He has not had sleep study prior to this test. No known family history of sleep apnea. Patient was hospitalized in 2020 for 7 days due to a COVID-19 infection. He started experiencing these feelings of fatigue and excessive sleepiness following the infection. He reports that he did not feel these symptoms prior to infection. 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 His sleep pattern at home has continued to be varied, with no regular sleep pattern. He believes he is getting 6-7 hours of sleep total per day, but will stay up all night sometimes and sleep during the day. Other times he sleeps at night. He spends much of his day watching TV. He denies taking regularly scheduled naps. Sleep Study Results  Home sleep study 21. AHI 24.7. SpO2 Aníbal 80%    Bed time: Varied due to  schedule.  Now is up most of the night     Wake time: Varied due to  schedule    Sleep Latency (min): after TV, 10 min   Sleep Medications: None  Awakenings:   1   / bathroom  / falls back asleep quickly, sometimes it takes a while if TV is on  Estimated Sleep time (hours): 6-7   Daytime Naps: Occasional (sometimes due to boredom)  Sleep disturbances: None  Sleep Location: Bed  Sleep environment: Sleeps alone- at times with partner  Occupation: --- All shifts  (not currently working)    [address] before bed: TV  Caffeine:   1 cup  in the am  Coffee    0   Soda    rare   Tea  Alcohol: N  Tobacco: N- Former- quit in      Sleep ROS:     Snoring: Y, occasional  Witnessed apneas: N  AM Headache: N  Dry Mouth: Rare  Daytime Sleepiness: Y  Difficulty remembering things: N  MVA  or near miss accident due to sleepiness in the past? N  Tonsillectomy? N  Have you lost or gained weight recently? Stable, maybe 5 pound gain this year       PARASOMNIAS/ NARCOLEPSY:  Hypnogogic/Hynopompic Hallucinations: N   Sleep paralysis: N  Cataplexy: N   REM behavior symptoms: N  Nightmare: N   Sleep Walking: N  Sleep Talking: N  RLS Symptoms: N    PMH:  Past Medical History:   Diagnosis Date    Diabetes mellitus ([facility] [address]         PSH:  Past Surgical History:   Procedure Laterality Date    COLONOSCOPY N/A 2021    COLONOSCOPY WITH BIOPSY performed [facility] Pooja [facility]cher MD at [facility] N/A 2021    [facility]GD BIOPSY [facility] at [facility] Hx:  Social History     Tobacco Use    Smoking status: Former Smoker     Packs/day: 1.50     Years: 25.00     Pack years: 37.50     Types: Cigarettes     Start date: 1961     Quit date: 1986     Years since quittin.4    Smokeless tobacco: Never Used   Vaping Use    Vaping Use: Never used   Substance Use Topics    Alcohol use: Not Currently    Drug use: Not Currently        Fam Hx:  No family history on file.      Current Outpatient Medications   Medication Sig Dispense Refill    ferrous sulfate (IRON 325) 325 (65 Fe) MG tablet Take 1 tablet by mouth daily (with breakfast) 90 tablet 1    rosuvastatin (CRESTOR) 5 MG tablet Take 1 tablet by mouth nightly 90 tablet 1    folic acid (FOLVITE) 1 MG tablet Take 1 tablet by mouth daily 90 tablet 1    blood glucose test strips (ACCU-CHEK GUIDE) strip TEST 2 TIMES A DAY & AS NEEDED FOR SYMPTOMS OF IRREGULAR BLOOD GLUCOSE. 100 strip 3    famotidine (PEPCID) 20 MG tablet Take 1 tablet by mouth 2 times daily 60 tablet 11    glipiZIDE (GLUCOTROL) 5 MG tablet Take 1 tablet by mouth daily 90 tablet 1    metFORMIN (GLUCOPHAGE) 500 MG tablet Take 1 tablet by mouth 2 times daily (with meals) 180 tablet 1    Cholecalciferol (VITAMIN D3) 25 MCG TABS Take 1 tablet by mouth daily 90 tablet 1    glucose monitoring kit (FREESTYLE) monitoring kit 1 kit by Does not apply route daily 1 kit 0    Lancets MISC 1 each by Does not apply route daily 100 each 3    hydrocortisone (ANUSOL-HC) 2.5 % CREA rectal cream Apply bid 28 g 3    ELIQUIS 5 MG TABS tablet TAKE 1 TABLET TWICE DAILY 180 tablet 1     No current facility-administered medications for this visit. Review of Systems  Constitutional: no chills, no fever and no night sweats. Eyes: no blurred vision and no eyesight problems. ENT: no hearing loss, no nasal congestion, no nasal discharge, no hoarseness and no sore throat. Cardiovascular: no chest pain, no lower extremity edema. Respiratory: no cough, no shortness of breath at rest and no wheezing. Gastrointestinal: no abdominal pain, no nausea and no vomiting. Musculoskeletal: no arthralgias, no back pain and no myalgias. Integumentary: no rashes. Neurological: no difficulty walking, no diplopia, no dizziness, no dysarthria, no facial weakness, no headache, no limb weakness, no memory changes, no numbness, no speech difficulties and no tingling. Endocrine: no changes in appetite, no feelings of weakness and no recent abnormal weight gain/loss.    Hematologic/Lymphatic: no tendency for easy bruising or bleeding      Objective:   Physical Exam  /76 (Site: Left Upper Arm, Position: Sitting, Cuff Size: Large Adult)   Pulse 69   Resp 16   Ht 6' (1.829 m)   Wt 203 lb 11.2 oz (92.4 kg)   SpO2 98%   BMI 27.63 kg/m²             General: No acute distress. BMI Body mass index is 27.63 kg/m². HEENT: Normocephalic, atraumatic. No oropharyngeal lesions. Additional Measurements    06/23/21 0728   Neck circumference: 16        Mallampati class- 3  Tonsils- 1     Neck: Trachea midline  Lungs: Clear to auscultation bilaterally. No wheezes or crackles  Cardiac: Regular rate and rhythm. No murmurs appreciated. Neurologic: Normal gait. Balance intact. Psychiatric: Alert and oriented. Appropriate affect. Extremities: No clubbing or no peripheral edema  Skin: No lesions or rashes  Hematologic/lymphatic/immunologic: No bruises or bleeding    Sleep Medicine 6/23/2021   Sitting and reading 0   Watching TV 3   Sitting, inactive in a public place (e.g. a theatre or a meeting) 0   As a passenger in a car for an hour without a break 0   Lying down to rest in the afternoon when circumstances permit 1   Sitting and talking to someone 0   Sitting quietly after a lunch without alcohol 3   In a car, while stopped for a few minutes in traffic 0   Total score 7   Neck circumference 16       [facility] sleep study 4/19/21. AHI 24.7. SpO2 Aníbal 80%    PERTINENT LAB RESULTS  No results found for: FERRITIN       Assessment:      John To was seen today for sleep apnea. Diagnoses and all orders for this visit:    Obstructive sleep apnea  -     DME Order for CPAP as OP    Poor sleep hygiene    Overweight    ·    Plan:      1. CUCA. Treatment options were discussed with the patient, including weight loss, CPAP titration, APAP trial, oral appliance evaluation, and possible surgical options. After discussion, patient elected to proceed with APAP trial. Will start APAP 4-15 cwp and follow up in 2-3 months. - Aware and instructed patient on insurance compliance meaures  - Discussed pathophysiology of CUCA and its impact on daily well-being, as well as cardiometabolic and neurocognitive health (particularly in moderate-severe cases). 2. Poor sleep hygiene  - Instructed patient to attempt to regulate bedtimes and awakening times in order to alleviate some daytime fatigue  - Sleep hygiene habits discussed, including trying not to leave TV on all night  -Sleep hygiene tips provided in handout    3. Overweight. Body mass index is 27.63 kg/m².  -Healthy weight loss advised  -Aware that weight gain can impact severity of sleep apnea  - Offered referral to weight loss clinic, patient declined at this time      Patient will follow up Return in about 2 months (around 8/23/2021) for CPAP compliance, Follow up After PAP Trial.    Bre Jon, [address] [place] [address] Rd [phone] option 2  F- [phone]      The patient was seen and examined with Kellee Solis CNP. This note is a combined entry by myself and Kellee Solis CNP. I personally performed the key elements of the history and physical examination and agree with the nurse practioner's findings.

## 2021-06-23 NOTE — PATIENT INSTRUCTIONS
It was a pleasure seeing you today Jevon Rolle! Summary of Today's Visit          1. Start APAP 4-15 cwp  There are several supply companies in the area, please let us know which company you would like to use for your CPAP and supplies. New PAP Therapy instructions to be compliant with most insurance coverage:    - Use PAP device for atleast 4 hours nightly. - PAP therapy must be used for 70% of nights (5/7 nights per week)    - Patient must follow up in the clinic within 30-90 days from getting the PAP device. 2. Contact your DME company about supplies or issues with your machine. As a general guideline, please replace your:  -CPAP mask every 3-6 months  -CPAP hose  every 3-6 months  -Filter every 2-4 weeks  -CPAP/BiPAP/ASV replacements every 5-7+ years     3. Continue healthy weight loss/stabilization, as this is recommended as a long-term intervention. 4. Please do not drive or operate machinery when you feel fatigued/sleepy/drowsy      5. Please try to sleep between 6-8 hours nightly with the CPAP mask    6. Please avoid sedatives, alcohol and caffeinated drinks at/near bed time. 7. Please call your supply (DME) company with any issues with your PAP device. Please call our office with any issues pertaining to the therapy.   ----Please note that complications of CUCA if not treated can increase risk for: systemic hypertension, pulmonary hypertension, cardiovascular morbidities (heart attack and stroke), car accidents and all cause mortality.            For general questions or scheduling issues, call my nurse at [phone] option [address] [place], [place] [phone] option 2  F- [phone]        ----------------------------------------------------------    Please note that complications of CUCA if not treated can increase risk for: systemic hypertension , pulmonary hypertension (high blood pressure in the lungs), cardiovascular morbidities (heart attack and stroke), car accidents and all cause mortality (increased risk of death).    ----------------------------------------------------------  Common Issues and Solutions     Pillow is dislodging mask - Consider getting a CPAP pillow or switching to a mask with the hose at the top. Dry Mouth - Adjust Humidity and/or try Biotene Gel. (Excessive leak can also cause this)     Leak - Please call your equipment provider  as they may need to adjust your mask. Difficulty tolerating the PAP mask - Contact your equipment company to try a different mask, we can order a \"mini sleep study\"with the sleep tech to try different masks/settings of pressure, also we have a sleep psychologist to help with anxiety related to wearing the PAP mask      ----------------------------------------------------------     For Questions and Concerns: In case of difficulty with your PAP device or mask interface, please FIRST contact your [facility] (The company who provides you the CPAP/BiPAP/ASV machine/supplies). If you need the number for any other DME company, please call my Nurse at [phone] option 2     For questions concerning your [place] appointment: Call [phone] option 2  SLEEP LAB SCHEDULING:        ----------------------------------------------------------     Helpful Web Sites: For patients with ALL SLEEP DISORDERS: American Academy of Sleep Medicine http://sleepeducation. org; or TherapeuticsMD: https://sleepfoundation. org  For patients with CUCA: American Sleep Apnea Association: http://hidalgo.org/. org  For patients with RLS: RLS Foundation: Missy.marvin  For patients with INSOMNIA: https://www.pittman.org/. org/articles/sleep/insomnia-causes-and-cures. htm  For patients with DEPRESSION: Hiperos.com.Pikanote. com/depression            Learning About CPAP for Sleep Apnea  What is CPAP?      CPAP/Bi-PAP is a small machine that you use at home every night while you sleep. It increases air pressure in your throat to keep your airway open. When you have sleep apnea, this can help you sleep better so you feel much better. CPAP stands for \"continuous positive airway pressure. \" Bi-PAP is a different PAP setting that allows for different pressures when you breathe in and out. The CPAP/Bi-PAP machine will have one of the following:  · A mask that covers your nose and mouth  · Prongs that fit into your nose  · A mask that covers your nose only, the most common type. This type is called NCPAP. The N stands for \"nasal.\"  Why is it done? CPAP is a common/effective treatment for obstructive sleep apnea. How does it help? · CPAP can help you have more normal sleep, so you feel less sleepy and more alert during the daytime through the treatment of sleep apnea. · CPAP may help keep heart failure or other heart problems from getting worse. · CPAP may help lower your blood pressure. · If you use CPAP, your bed partner may also sleep better because you are snoring less. What are the side effects? Some people who use CPAP have:  · A dry or stuffy nose and a sore throat. · Irritated skin on the face. · Sore eyes. · Bloating. If you have any of these problems, work with your doctor to fix them. Here are some things you can try:  · Be sure the mask or nasal prongs fit well. · See if your doctor can adjust the pressure of your CPAP. · If your nose is dry, try a humidifier. If these things do not help, you might try a different type of machine. Some machines have air pressure that adjusts on its own. Others have air pressures that are different when you breathe in than when you breathe out. This may reduce discomfort caused by too much pressure in your nose. Where can you learn more? Go to https://chpepicmarinaeb.[employer]. org and sign in to your [employer] account.  Enter M872 in the [employer] box to learn more about \"Learning About CPAP for Sleep Apnea. \"     If you do not have an account, please click on the \"Sign Up Now\" link. Current as of: February 24, 2020               Content Version: 12.5  © 2006-2020 Healthwise, Incorporated. Care instructions adapted under license by [facility] ([facility]). If you have questions about a medical condition or this instruction, always ask your healthcare professional. [address] any warranty or liability for your use of this information. Sleep Hygiene Guidelines  Good dental hygiene is important in determining the health of your teeth and gums. Similarly, good sleep hygiene is important in determining the quality and quantity of your sleep. Review the below guidelines and check the ones you think you might be breaking. 1. Screen time: Turn off TV, computers, tablets, and smart phones 1 hour Before Bedtime  The short waves of blue light (emitted from the screens of TVs, laptops, iPads, smart phones, etc.) mimic daylight. Thinking its daytime, your brain suppresses melatonin and becomes more alert because we have evolved to see this type of light only during the day. Whats more, the overall stimulation we get from these devices serves to keep us more alert. If TV is your relaxing activity, try to move it up a bit earlier in the evening. The TV should be in a separate room - NOT your bedroom. 2. Caffeine: Avoid Caffeine 6-8 Hours Before Bedtime   Caffeine disturbs sleep, even in people who dont think they experience a stimulation effect.  Individuals with insomnia are often more sensitive to mild stimulants than are normal sleepers.  Caffeine is found in items such as coffee, tea, soda, chocolate, and many over-the-counter medications (e.g., Excedrin).  Caffeine should be avoided in the afternoon and evening, preferably taken no later than 1pm. You might consider a trial period of no caffeine at all.      3. Nicotine: Avoid Nicotine Before Bedtime   Although some smokers claim that smoking helps them relax, nicotine is a stimulant.  The initial relaxing effects occur with the initial entry of the nicotine, but as the nicotine builds in the body, it produces an effect similar to caffeine.  Nicotine should be avoided near bedtime and during the night. Dont smoke to get yourself back to sleep. 4. Alcohol: Avoid Alcohol After Dinner   Alcohol often promotes the onset of sleep, but as alcohol is metabolized during the night, sleep becomes disturbed and fragmented, leading to poor sleep quality.  Limit alcohol use to 1 beer or glass of wine per day for women, 2  drinks per day for men. 5. Sleeping Pills: Sleep Medications are Effective Only Temporarily   Research has shown that sleep medications lose their effectiveness in about 2 - 4 weeks when taken regularly.  Over time, sleeping pills actually can make sleep problems worse. When sleeping pills have been used over a long period, withdrawal from the medication can lead to an insomnia rebound. Thus, after long- term use, many individuals incorrectly conclude that they need sleeping pills in order to sleep normally.  Keep use of sleep meds infrequent, but dont worry if you need to use one on an occasional basis. (And always consult with your doctor first if you decide to make changes to your medication regimen.)     6. Regular Exercise   Exercise has been shown to aid sleep, although the positive effect often takes several weeks to become noticeable.  However, exercise earlier in the day if possible. Exercise within 2 hours of bedtime may elevate nervous system activity and interfere with sleep onset.  Get regular exercise, preferably at least 20 minutes each day of an activity that causes sweating. 7. Hot Baths   Spending 20 minutes in a tub of hot water an hour or two prior to bedtime may promote sleep. 8.Bedroom Environment: Moderate Temperature, Quiet, and Dark   Extremes of heat or cold can disrupt sleep. Keep your room at a comfortable temperature.  Noises can be masked with background white noise (such as the noise of a fan) or with earplugs.  Bedrooms may be darkened with black-out shades or sleep masks can be worn.  Position clocks out-of-sight since clock-watching can increase worry about the effects of lack of sleep. 9. Eating   A light bedtime snack, such a glass of warm milk, cheese, or a bowl of cereal can promote sleep.  Avoid heavy or spicy meals before bedtime and any caffeinated foods (e.g., chocolate).  Avoid snacks in the middle of the night since awakening may become associated with hunger.  Do not go to bed too hungry or too full. 10. Avoid/Reduce Naps   The sleep you obtain during the day takes away from your sleep need at night - resulting in lighter, more restless sleep, difficulty falling asleep, and/or early morning awakening.  Avoid naps. If you must nap, keep it brief. It is best to set an alarm to ensure you dont sleep more than 15 minutes. 11. Regular Sleep Schedule   Keep a regular time each day, 7 days a week, to get out of bed. Keeping a regular awakening time helps set your circadian rhythm so that your body learns to sleep at the desired time.

## 2021-06-25 ENCOUNTER — OFFICE VISIT (OUTPATIENT)
Dept: FAMILY MEDICINE CLINIC | Age: 71
End: 2021-06-25
Payer: MEDICARE

## 2021-06-25 VITALS
SYSTOLIC BLOOD PRESSURE: 126 MMHG | OXYGEN SATURATION: 97 % | HEART RATE: 64 BPM | DIASTOLIC BLOOD PRESSURE: 72 MMHG | WEIGHT: 203 LBS | TEMPERATURE: 97.2 F | BODY MASS INDEX: 27.5 KG/M2 | HEIGHT: 72 IN

## 2021-06-25 DIAGNOSIS — I26.99 ACUTE PULMONARY EMBOLISM WITHOUT ACUTE COR PULMONALE, UNSPECIFIED PULMONARY EMBOLISM TYPE (HCC): ICD-10-CM

## 2021-06-25 DIAGNOSIS — E78.5 DYSLIPIDEMIA: ICD-10-CM

## 2021-06-25 DIAGNOSIS — E11.9 TYPE 2 DIABETES MELLITUS WITHOUT COMPLICATION, WITHOUT LONG-TERM CURRENT USE OF INSULIN (HCC): Primary | ICD-10-CM

## 2021-06-25 DIAGNOSIS — I10 ESSENTIAL HYPERTENSION: ICD-10-CM

## 2021-06-25 DIAGNOSIS — U07.1 PNEUMONIA DUE TO COVID-19 VIRUS: ICD-10-CM

## 2021-06-25 DIAGNOSIS — J12.82 PNEUMONIA DUE TO COVID-19 VIRUS: ICD-10-CM

## 2021-06-25 DIAGNOSIS — R53.83 FATIGUE, UNSPECIFIED TYPE: ICD-10-CM

## 2021-06-25 PROCEDURE — 1036F TOBACCO NON-USER: CPT | Performed by: FAMILY MEDICINE

## 2021-06-25 PROCEDURE — G8417 CALC BMI ABV UP PARAM F/U: HCPCS | Performed by: FAMILY MEDICINE

## 2021-06-25 PROCEDURE — G8427 DOCREV CUR MEDS BY ELIG CLIN: HCPCS | Performed by: FAMILY MEDICINE

## 2021-06-25 PROCEDURE — 1123F ACP DISCUSS/DSCN MKR DOCD: CPT | Performed by: FAMILY MEDICINE

## 2021-06-25 PROCEDURE — 2022F DILAT RTA XM EVC RTNOPTHY: CPT | Performed by: FAMILY MEDICINE

## 2021-06-25 PROCEDURE — 3052F HG A1C>EQUAL 8.0%<EQUAL 9.0%: CPT | Performed by: FAMILY MEDICINE

## 2021-06-25 PROCEDURE — 4040F PNEUMOC VAC/ADMIN/RCVD: CPT | Performed by: FAMILY MEDICINE

## 2021-06-25 PROCEDURE — 99214 OFFICE O/P EST MOD 30 MIN: CPT | Performed by: FAMILY MEDICINE

## 2021-06-25 PROCEDURE — 3017F COLORECTAL CA SCREEN DOC REV: CPT | Performed by: FAMILY MEDICINE

## 2021-06-25 RX ORDER — TADALAFIL 5 MG/1
TABLET ORAL
COMMUNITY
Start: 2021-06-15 | End: 2022-10-10 | Stop reason: SDUPTHER

## 2021-06-25 RX ORDER — ASPIRIN 325 MG
325 TABLET, DELAYED RELEASE (ENTERIC COATED) ORAL 2 TIMES DAILY
Qty: 30 TABLET | Refills: 3 | Status: SHIPPED
Start: 2021-06-25 | End: 2021-08-25

## 2021-06-25 ASSESSMENT — ENCOUNTER SYMPTOMS
COLOR CHANGE: 0
SINUS PRESSURE: 0
RESPIRATORY NEGATIVE: 1
CHEST TIGHTNESS: 0
SORE THROAT: 0
TROUBLE SWALLOWING: 0
EYE REDNESS: 0
ABDOMINAL PAIN: 0
PHOTOPHOBIA: 0
COUGH: 0
VOMITING: 0
FACIAL SWELLING: 0
CHOKING: 0
EYE DISCHARGE: 0
BLOOD IN STOOL: 0
WHEEZING: 0
VISUAL CHANGE: 1
ORTHOPNEA: 0
APNEA: 0
EYE PAIN: 0
DIARRHEA: 0
SINUS PAIN: 0
STRIDOR: 0
EYE ITCHING: 0
ABDOMINAL DISTENTION: 0
BLURRED VISION: 0
NAUSEA: 0
ANAL BLEEDING: 0
ALLERGIC/IMMUNOLOGIC NEGATIVE: 1
RECTAL PAIN: 0
BACK PAIN: 0
SHORTNESS OF BREATH: 0
CONSTIPATION: 0
RHINORRHEA: 0
VOICE CHANGE: 0

## 2021-06-25 NOTE — PROGRESS NOTES
Isma Preston is a 66 y.o. male. HPI/Chief C/O:  Chief Complaint   Patient presents with    Diabetes     patient here for 4 week follow up from last visit. Patient states he is feeling pretty good    Sleep Apnea     patient states he now uses a cpap machine-review sleep study results     No Known Allergies  The patient is here for a medication list and treatment planning review  We will go over our care planning goals as well as take care of all refills  We will set up labs as well     Diabetes  He presents for his follow-up diabetic visit. He has type 2 diabetes mellitus. Pertinent negatives for hypoglycemia include no confusion, dizziness, headaches, nervousness/anxiousness, pallor, seizures, speech difficulty, sweats or tremors. Associated symptoms include fatigue and visual change. Pertinent negatives for diabetes include no blurred vision, no chest pain, no foot paresthesias, no foot ulcerations, no polydipsia, no polyphagia, no polyuria, no weakness and no weight loss. There are no hypoglycemic complications. Pertinent negatives for diabetic complications include no autonomic neuropathy, CVA, heart disease, nephropathy, peripheral neuropathy, PVD (H/O PE post COVID\) or retinopathy. Risk factors for coronary artery disease include male sex, hypertension and diabetes mellitus. Current diabetic treatment includes diet and oral agent (dual therapy). He is compliant with treatment some of the time. He is following a generally unhealthy diet. An ACE inhibitor/angiotensin II receptor blocker is not being taken. Hypertension  Associated symptoms include malaise/fatigue. Pertinent negatives include no anxiety, blurred vision, chest pain, headaches, neck pain, orthopnea, palpitations, peripheral edema, PND, shortness of breath or sweats. Risk factors for coronary artery disease include male gender, diabetes mellitus and dyslipidemia. Past treatments include lifestyle changes.  The current treatment provides significant improvement. Compliance problems include diet and exercise. There is no history of angina, kidney disease, CAD/MI, CVA, heart failure, left ventricular hypertrophy, PVD (H/O PE post COVID\) or retinopathy. There is no history of chronic renal disease, coarctation of the aorta, hyperaldosteronism, hypercortisolism, hyperparathyroidism, a hypertension causing med, pheochromocytoma, renovascular disease, sleep apnea or a thyroid problem. ROS:  Review of Systems   Constitutional: Positive for fatigue and malaise/fatigue. Negative for activity change, appetite change, chills, diaphoresis, fever, unexpected weight change and weight loss. HENT: Negative. Negative for congestion, dental problem, drooling, ear discharge, ear pain, facial swelling, hearing loss, mouth sores, nosebleeds, postnasal drip, rhinorrhea, sinus pressure, sinus pain, sneezing, sore throat, tinnitus, trouble swallowing and voice change. Eyes: Negative for blurred vision, photophobia, pain, discharge, redness, itching and visual disturbance. Respiratory: Negative. Negative for apnea, cough, choking, chest tightness, shortness of breath, wheezing and stridor. Cardiovascular: Negative. Negative for chest pain, palpitations, orthopnea, leg swelling and PND. Gastrointestinal: Negative for abdominal distention, abdominal pain, anal bleeding, blood in stool, constipation, diarrhea, nausea, rectal pain and vomiting. Endocrine: Negative. Negative for cold intolerance, heat intolerance, polydipsia, polyphagia and polyuria. Genitourinary: Negative. Negative for decreased urine volume, difficulty urinating, discharge, dysuria, enuresis, flank pain, frequency, genital sores, hematuria, penile pain, penile swelling, scrotal swelling, testicular pain and urgency. Musculoskeletal: Negative. Negative for arthralgias, back pain, gait problem, joint swelling, myalgias, neck pain and neck stiffness. Skin: Negative. Negative for color change, pallor, rash and wound. Allergic/Immunologic: Negative. Negative for environmental allergies, food allergies and immunocompromised state. Neurological: Negative for dizziness, tremors, seizures, syncope, facial asymmetry, speech difficulty, weakness, light-headedness, numbness and headaches. Hematological: Negative. Negative for adenopathy. Does not bruise/bleed easily. Psychiatric/Behavioral: Negative. Negative for agitation, behavioral problems, confusion, decreased concentration, dysphoric mood, hallucinations, self-injury, sleep disturbance and suicidal ideas. The patient is not nervous/anxious and is not hyperactive. Past Medical/Surgical Hx;  Reviewed with patient      Diagnosis Date    Diabetes mellitus ([facility] [address]      Past Surgical History:   Procedure Laterality Date    COLONOSCOPY N/A 2021    COLONOSCOPY WITH BIOPSY performed by Will Justin MD at [address] [facility] N/A 2021    EGD BIOPSY performed by Will Justin MD at [facility] ENDOSCOPY       Past Family Hx:  Reviewed with patient  History reviewed. No pertinent family history. Social Hx:  Reviewed with patient  Social History     Tobacco Use    Smoking status: Former Smoker     Packs/day: 1.50     Years: 25.00     Pack years: 37.50     Types: Cigarettes     Start date: 1961     Quit date: 1986     Years since quittin.5    Smokeless tobacco: Never Used   Substance Use Topics    Alcohol use: Not Currently       OBJECTIVE  /72   Pulse 64   Temp 97.2 °F (36.2 °C)   Ht 6' (1.829 m)   Wt 203 lb (92.1 kg)   SpO2 97%   BMI 27.53 kg/m²     Problem List:  Sherri Montemayor does not have any pertinent problems on file. PHYS EX:  Physical Exam  Vitals and nursing note reviewed. Constitutional:       General: He is not in acute distress. Appearance: Normal appearance. He is well-developed. He is not ill-appearing, toxic-appearing or diaphoretic. HENT:      Head: Normocephalic and atraumatic. Right Ear: External ear normal. There is no impacted cerumen. Left Ear: External ear normal. There is no impacted cerumen. Nose: Nose normal. No congestion or rhinorrhea. Mouth/Throat:      Mouth: Mucous membranes are moist.      Pharynx: Oropharynx is clear. No oropharyngeal exudate or posterior oropharyngeal erythema. Eyes:      General: No scleral icterus. Right eye: No discharge. Left eye: No discharge. Neck:      Thyroid: No thyromegaly. Vascular: No carotid bruit. Trachea: No tracheal deviation. Cardiovascular:      Rate and Rhythm: Normal rate and regular rhythm. Pulses: Normal pulses. Heart sounds: Normal heart sounds. No murmur heard. No friction rub. No gallop. Pulmonary:      Effort: Pulmonary effort is normal. No respiratory distress. Breath sounds: Normal breath sounds. No stridor. No wheezing, rhonchi or rales. Chest:      Chest wall: No tenderness. Abdominal:      General: Bowel sounds are normal. There is no distension. Palpations: Abdomen is soft. There is no mass. Tenderness: There is no abdominal tenderness. There is no right CVA tenderness, left CVA tenderness, guarding or rebound. Hernia: No hernia is present. Genitourinary:     Penis: No tenderness. Musculoskeletal:         General: No swelling, tenderness, deformity or signs of injury. Normal range of motion. Cervical back: Normal range of motion and neck supple. No rigidity. No muscular tenderness. Right lower leg: No edema. Left lower leg: No edema. Lymphadenopathy:      Cervical: No cervical adenopathy. Skin:     General: Skin is warm. Coloration: Skin is not jaundiced or pale. Findings: No bruising, erythema, lesion or rash. Neurological:      General: No focal deficit present. Mental Status: He is alert and oriented to person, place, and time.       Cranial Nerves: No cranial nerve deficit. Sensory: No sensory deficit. Motor: No weakness or abnormal muscle tone. Coordination: Coordination normal.      Gait: Gait normal.      Deep Tendon Reflexes: Reflexes are normal and symmetric. Reflexes normal.         ASSESSMENT/PLAN  Julian was seen today for diabetes and sleep apnea. Diagnoses and all orders for this visit:    Type 2 diabetes mellitus without complication, without long-term current use of insulin ([address]  -     Basic Metabolic Panel; Future  -     CBC Auto Differential; Future  -     Hemoglobin A1C; Future  -     Microalbumin, Ur; Future    ---VASCULAR PANEL  A) ASA, plavix, aggrenox  B) coumadin, pletal, tzd, STATIN  C) ace, hctz, FOLIC, ccb  D) cannikinumab, fish oils     ---CARDIAC---ASA, ace, beta, STATIN, hctz, ( ccb )    Essential hypertension ---controlled   --patient is instructed on low to moderate sodium ( 2 to 2.5 grams ), daily    Also to increase potassium in the diet to about 3.5 grams daily    Literature is provided     -     Basic Metabolic Panel; Future  -     CBC Auto Differential; Future    Acute pulmonary embolism without acute cor pulmonale, unspecified pulmonary embolism type (HCC)  -     Basic Metabolic Panel; Future  -     CBC Auto Differential; Future  --stable on current care planning  -- continue treatment as we are meeting goals   --this was secondary to COVID    Dyslipidemia  -     Basic Metabolic Panel; Future  -     Lipid Panel; Future  -     CBC Auto Differential; Future  --Mediterranean diet, exercise, weight loss, vitamins    We have a long talk on cholesterol and importance of lowering it       Fatigue, unspecified type  -     TSH without Reflex; Future  -     Uric Acid; Future  -     Basic Metabolic Panel; Future  -     CBC Auto Differential; Future    Pneumonia due to COVID-19 virus  Long talk on treatment and prevention  Literature is given   --resolved     Other orders  -     aspirin 325 MG EC tablet;  Take 1 tablet by mouth 2 times daily        Outpatient Encounter Medications as of 6/25/2021   Medication Sig Dispense Refill    CPAP Machine MISC by Does not apply route      aspirin 325 MG EC tablet Take 1 tablet by mouth 2 times daily 30 tablet 3    ferrous sulfate (IRON 325) 325 (65 Fe) MG tablet Take 1 tablet by mouth daily (with breakfast) 90 tablet 1    rosuvastatin (CRESTOR) 5 MG tablet Take 1 tablet by mouth nightly 90 tablet 1    folic acid (FOLVITE) 1 MG tablet Take 1 tablet by mouth daily 90 tablet 1    blood glucose test strips (ACCU-CHEK GUIDE) strip TEST 2 TIMES A DAY & AS NEEDED FOR SYMPTOMS OF IRREGULAR BLOOD GLUCOSE. 100 strip 3    famotidine (PEPCID) 20 MG tablet Take 1 tablet by mouth 2 times daily 60 tablet 11    glipiZIDE (GLUCOTROL) 5 MG tablet Take 1 tablet by mouth daily 90 tablet 1    metFORMIN (GLUCOPHAGE) 500 MG tablet Take 1 tablet by mouth 2 times daily (with meals) 180 tablet 1    Cholecalciferol (VITAMIN D3) 25 MCG TABS Take 1 tablet by mouth daily 90 tablet 1    glucose monitoring kit (FREESTYLE) monitoring kit 1 kit by Does not apply route daily 1 kit 0    Lancets MISC 1 each by Does not apply route daily 100 each 3    hydrocortisone (ANUSOL-HC) 2.5 % CREA rectal cream Apply bid 28 g 3    tadalafil (CIALIS) 5 MG tablet TAKE 1 TABLET BY MOUTH EVERY DAY AS NEEDED      [DISCONTINUED] ELIQUIS 5 MG TABS tablet TAKE 1 TABLET TWICE DAILY 180 tablet 1     No facility-administered encounter medications on file as of 6/25/2021. Return in about 3 months (around 9/25/2021).         Reviewed recent labs related to Julian's current problems      Discussed importance of regular Health Maintenance follow up  Health Maintenance   Topic    AAA screen     Hepatitis C screen     Diabetic foot exam     DTaP/Tdap/Td vaccine (1 - Tdap)    Shingles Vaccine (1 of 2)    Pneumococcal 65+ years Vaccine (1 of 1 - PPSV23)    Flu vaccine (Season Ended)    Annual Wellness Visit (AWV)     A1C test (Diabetic or Prediabetic)     Diabetic microalbuminuria test     Lipid screen     Potassium monitoring     Creatinine monitoring     PSA counseling     Diabetic retinal exam     Colon cancer screen colonoscopy     COVID-19 Vaccine     Hepatitis A vaccine     Hib vaccine     Meningococcal (ACWY) vaccine

## 2021-06-25 NOTE — PATIENT INSTRUCTIONS
Patient Education        Learning About Meal Planning for Diabetes  Why plan your meals? Meal planning can be a key part of managing diabetes. Planning meals and snacks with the right balance of carbohydrate, protein, and fat can help you keep your blood sugar at the target level you set with your doctor. You don't have to eat special foods. You can eat what your family eats, including sweets once in a while. But you do have to pay attention to how often you eat and how much you eat of certain foods. You may want to work with a dietitian or a certified diabetes educator. He or she can give you tips and meal ideas and can answer your questions about meal planning. This health professional can also help you reach a healthy weight if that is one of your goals. What plan is right for you? Your dietitian or diabetes educator may suggest that you start with the plate format or carbohydrate counting. The plate format  The plate format is a simple way to help you manage how you eat. You plan meals by learning how much space each food should take on a plate. Using the plate format helps you spread carbohydrate throughout the day. It can make it easier to keep your blood sugar level within your target range. It also helps you see if you're eating healthy portion sizes. To use the plate format, you put non-starchy vegetables on half your plate. Add meat or meat substitutes on one-quarter of the plate. Put a grain or starchy vegetable (such as brown rice or a potato) on the final quarter of the plate. You can add a small piece of fruit and some low-fat or fat-free milk or yogurt, depending on your carbohydrate goal for each meal.  Here are some tips for using the plate format:  · Make sure that you are not using an oversized plate. A 9-inch plate is best. Many restaurants use larger plates. · Get used to using the plate format at home. Then you can use it when you eat out.   · Write down your questions about using the plate format. Talk to your doctor, a dietitian, or a diabetes educator about your concerns. Carbohydrate counting  With carbohydrate counting, you plan meals based on the amount of carbohydrate in each food. Carbohydrate raises blood sugar higher and more quickly than any other nutrient. It is found in desserts, breads and cereals, and fruit. It's also found in starchy vegetables such as potatoes and corn, grains such as rice and pasta, and milk and yogurt. Spreading carbohydrate throughout the day helps keep your blood sugar levels within your target range. Your daily amount depends on several things, including your weight, how active you are, which diabetes medicines you take, and what your goals are for your blood sugar levels. A registered dietitian or diabetes educator can help you plan how much carbohydrate to include in each meal and snack. A guideline for your daily amount of carbohydrate is:  · 45 to 60 grams at each meal. That's about the same as 3 to 4 carbohydrate servings. · 15 to 20 grams at each snack. That's about the same as 1 carbohydrate serving. The Nutrition Facts label on packaged foods tells you how much carbohydrate is in a serving of the food. First, look at the serving size on the food label. Is that the amount you eat in a serving? All of the nutrition information on a food label is based on that serving size. So if you eat more or less than that, you'll need to adjust the other numbers. Total carbohydrate is the next thing you need to look for on the label. If you count carbohydrate servings, one serving of carbohydrate is 15 grams. For foods that don't come with labels, such as fresh fruits and vegetables, you'll need a guide that lists carbohydrate in these foods. Ask your doctor, dietitian, or diabetes educator about books or other nutrition guides you can use.   If you take insulin, you need to know how many grams of carbohydrate are in a meal. This lets you know how much Version: 12.9  © 4087-7600 [employer]. Care instructions adapted under license by [facility] ([facility]). If you have questions about a medical condition or this instruction, always ask your healthcare professional. [address] any warranty or liability for your use of this information.

## 2021-06-28 ENCOUNTER — TELEPHONE (OUTPATIENT)
Dept: FAMILY MEDICINE CLINIC | Age: 71
End: 2021-06-28

## 2021-07-01 LAB — PROSTATE SPECIFIC ANTIGEN: 4.18 NG/ML (ref 0–4)

## 2021-07-22 DIAGNOSIS — E11.9 TYPE 2 DIABETES MELLITUS WITHOUT COMPLICATION, WITHOUT LONG-TERM CURRENT USE OF INSULIN (HCC): ICD-10-CM

## 2021-08-07 DIAGNOSIS — E11.9 TYPE 2 DIABETES MELLITUS WITHOUT COMPLICATION, WITHOUT LONG-TERM CURRENT USE OF INSULIN (HCC): ICD-10-CM

## 2021-08-09 RX ORDER — GLIPIZIDE 5 MG/1
TABLET ORAL
Qty: 90 TABLET | Refills: 1 | Status: SHIPPED
Start: 2021-08-09 | End: 2022-04-06

## 2021-08-18 DIAGNOSIS — E11.9 TYPE 2 DIABETES MELLITUS WITHOUT COMPLICATION, WITHOUT LONG-TERM CURRENT USE OF INSULIN (HCC): ICD-10-CM

## 2021-08-18 RX ORDER — BLOOD SUGAR DIAGNOSTIC
STRIP MISCELLANEOUS
Qty: 100 STRIP | Refills: 3 | Status: SHIPPED
Start: 2021-08-18 | End: 2021-12-09

## 2021-08-21 DIAGNOSIS — E11.9 TYPE 2 DIABETES MELLITUS WITHOUT COMPLICATION, WITHOUT LONG-TERM CURRENT USE OF INSULIN (HCC): ICD-10-CM

## 2021-08-23 RX ORDER — MELATONIN
Qty: 90 TABLET | Refills: 1 | Status: SHIPPED
Start: 2021-08-23 | End: 2022-10-10 | Stop reason: SDUPTHER

## 2021-08-25 ENCOUNTER — OFFICE VISIT (OUTPATIENT)
Dept: FAMILY MEDICINE CLINIC | Age: 71
End: 2021-08-25
Payer: MEDICARE

## 2021-08-25 VITALS
TEMPERATURE: 98 F | HEIGHT: 72 IN | RESPIRATION RATE: 16 BRPM | OXYGEN SATURATION: 97 % | BODY MASS INDEX: 27.9 KG/M2 | HEART RATE: 58 BPM | DIASTOLIC BLOOD PRESSURE: 70 MMHG | SYSTOLIC BLOOD PRESSURE: 120 MMHG | WEIGHT: 206 LBS

## 2021-08-25 DIAGNOSIS — R53.83 FATIGUE, UNSPECIFIED TYPE: ICD-10-CM

## 2021-08-25 DIAGNOSIS — I10 ESSENTIAL HYPERTENSION: ICD-10-CM

## 2021-08-25 DIAGNOSIS — Z99.89 OSA ON CPAP: ICD-10-CM

## 2021-08-25 DIAGNOSIS — U09.9 COVID-19 LONG HAULER: ICD-10-CM

## 2021-08-25 DIAGNOSIS — R73.01 IFG (IMPAIRED FASTING GLUCOSE): ICD-10-CM

## 2021-08-25 DIAGNOSIS — G47.33 OSA ON CPAP: ICD-10-CM

## 2021-08-25 DIAGNOSIS — E11.9 TYPE 2 DIABETES MELLITUS WITHOUT COMPLICATION, WITHOUT LONG-TERM CURRENT USE OF INSULIN (HCC): Primary | ICD-10-CM

## 2021-08-25 LAB — HBA1C MFR BLD: 7.2 %

## 2021-08-25 PROCEDURE — 3051F HG A1C>EQUAL 7.0%<8.0%: CPT | Performed by: FAMILY MEDICINE

## 2021-08-25 PROCEDURE — 3017F COLORECTAL CA SCREEN DOC REV: CPT | Performed by: FAMILY MEDICINE

## 2021-08-25 PROCEDURE — G8427 DOCREV CUR MEDS BY ELIG CLIN: HCPCS | Performed by: FAMILY MEDICINE

## 2021-08-25 PROCEDURE — 1123F ACP DISCUSS/DSCN MKR DOCD: CPT | Performed by: FAMILY MEDICINE

## 2021-08-25 PROCEDURE — 99214 OFFICE O/P EST MOD 30 MIN: CPT | Performed by: FAMILY MEDICINE

## 2021-08-25 PROCEDURE — 1036F TOBACCO NON-USER: CPT | Performed by: FAMILY MEDICINE

## 2021-08-25 PROCEDURE — 4040F PNEUMOC VAC/ADMIN/RCVD: CPT | Performed by: FAMILY MEDICINE

## 2021-08-25 PROCEDURE — 83036 HEMOGLOBIN GLYCOSYLATED A1C: CPT | Performed by: FAMILY MEDICINE

## 2021-08-25 PROCEDURE — 2022F DILAT RTA XM EVC RTNOPTHY: CPT | Performed by: FAMILY MEDICINE

## 2021-08-25 PROCEDURE — G8417 CALC BMI ABV UP PARAM F/U: HCPCS | Performed by: FAMILY MEDICINE

## 2021-08-25 RX ORDER — ASPIRIN 81 MG/1
81 TABLET ORAL DAILY
Qty: 90 TABLET | Refills: 1
Start: 2021-08-25 | End: 2022-10-10 | Stop reason: SDUPTHER

## 2021-08-25 ASSESSMENT — ENCOUNTER SYMPTOMS
RHINORRHEA: 0
PHOTOPHOBIA: 0
CHEST TIGHTNESS: 0
SINUS PAIN: 0
ABDOMINAL PAIN: 0
NAUSEA: 0
RESPIRATORY NEGATIVE: 1
EYE DISCHARGE: 0
EYE ITCHING: 0
STRIDOR: 0
ORTHOPNEA: 0
DIARRHEA: 0
BLOOD IN STOOL: 0
TROUBLE SWALLOWING: 0
WHEEZING: 0
BLURRED VISION: 0
FACIAL SWELLING: 0
BACK PAIN: 0
VOICE CHANGE: 0
COLOR CHANGE: 0
SORE THROAT: 0
ALLERGIC/IMMUNOLOGIC NEGATIVE: 1
VOMITING: 0
RECTAL PAIN: 0
SINUS PRESSURE: 0
VISUAL CHANGE: 1
ABDOMINAL DISTENTION: 0
SHORTNESS OF BREATH: 0
EYE REDNESS: 0
APNEA: 0
CONSTIPATION: 0
ANAL BLEEDING: 0
CHOKING: 0
COUGH: 0
EYE PAIN: 0

## 2021-08-25 NOTE — PROGRESS NOTES
Rafael Michel is a 66 y.o. male. HPI/Chief C/O:  Chief Complaint   Patient presents with    Diabetes     Regular check up    Discuss Medications     Patient wants to discuss discontinuing some medications. No Known Allergies  The patient is here for a medication list and treatment planning review  We will go over our care planning goals as well as take care of all refills  We will set up labs as well   C/O still being tired and weak from covid     Diabetes  He presents for his follow-up diabetic visit. He has type 2 diabetes mellitus. Pertinent negatives for hypoglycemia include no confusion, dizziness, headaches, nervousness/anxiousness, pallor, seizures, speech difficulty, sweats or tremors. Associated symptoms include fatigue, visual change and weakness. Pertinent negatives for diabetes include no blurred vision, no chest pain, no foot paresthesias, no foot ulcerations, no polydipsia, no polyphagia, no polyuria and no weight loss. There are no hypoglycemic complications. Pertinent negatives for diabetic complications include no autonomic neuropathy, CVA, heart disease, nephropathy, peripheral neuropathy, PVD (H/O PE post COVID\) or retinopathy. Risk factors for coronary artery disease include male sex, hypertension and diabetes mellitus. Current diabetic treatment includes diet and oral agent (dual therapy). He is compliant with treatment some of the time. He is following a generally unhealthy diet. An ACE inhibitor/angiotensin II receptor blocker is not being taken. Hypertension  Associated symptoms include malaise/fatigue. Pertinent negatives include no anxiety, blurred vision, chest pain, headaches, neck pain, orthopnea, palpitations, peripheral edema, PND, shortness of breath or sweats. Risk factors for coronary artery disease include male gender, diabetes mellitus and dyslipidemia. Past treatments include lifestyle changes. The current treatment provides significant improvement. Compliance problems include diet and exercise. There is no history of angina, kidney disease, CAD/MI, CVA, heart failure, left ventricular hypertrophy, PVD (H/O PE post COVID\) or retinopathy. Identifiable causes of hypertension include sleep apnea. There is no history of chronic renal disease, coarctation of the aorta, hyperaldosteronism, hypercortisolism, hyperparathyroidism, a hypertension causing med, pheochromocytoma, renovascular disease or a thyroid problem. ROS:  Review of Systems   Constitutional: Positive for fatigue and malaise/fatigue. Negative for activity change, appetite change, chills, diaphoresis, fever, unexpected weight change and weight loss. HENT: Negative. Negative for congestion, dental problem, drooling, ear discharge, ear pain, facial swelling, hearing loss, mouth sores, nosebleeds, postnasal drip, rhinorrhea, sinus pressure, sinus pain, sneezing, sore throat, tinnitus, trouble swallowing and voice change. Eyes: Negative for blurred vision, photophobia, pain, discharge, redness, itching and visual disturbance. Respiratory: Negative. Negative for apnea, cough, choking, chest tightness, shortness of breath, wheezing and stridor. Cardiovascular: Negative. Negative for chest pain, palpitations, orthopnea, leg swelling and PND. Gastrointestinal: Negative for abdominal distention, abdominal pain, anal bleeding, blood in stool, constipation, diarrhea, nausea, rectal pain and vomiting. Endocrine: Negative. Negative for cold intolerance, heat intolerance, polydipsia, polyphagia and polyuria. Genitourinary: Negative. Negative for decreased urine volume, difficulty urinating, discharge, dysuria, enuresis, flank pain, frequency, genital sores, hematuria, penile pain, penile swelling, scrotal swelling, testicular pain and urgency. Musculoskeletal: Negative. Negative for arthralgias, back pain, gait problem, joint swelling, myalgias, neck pain and neck stiffness.    Skin: Negative. Negative for color change, pallor, rash and wound. Allergic/Immunologic: Negative. Negative for environmental allergies, food allergies and immunocompromised state. Neurological: Positive for weakness. Negative for dizziness, tremors, seizures, syncope, facial asymmetry, speech difficulty, light-headedness, numbness and headaches. Hematological: Negative. Negative for adenopathy. Does not bruise/bleed easily. Psychiatric/Behavioral: Negative. Negative for agitation, behavioral problems, confusion, decreased concentration, dysphoric mood, hallucinations, self-injury, sleep disturbance and suicidal ideas. The patient is not nervous/anxious and is not hyperactive. Past Medical/Surgical Hx;  Reviewed with patient      Diagnosis Date    Diabetes mellitus ([facility] [address]      Past Surgical History:   Procedure Laterality Date    COLONOSCOPY N/A 2021    COLONOSCOPY WITH BIOPSY performed by Christian Williamson MD at [address] [place] [address] N/A 2021    EGD BIOPSY performed by Christian Williamson MD at [facility] ENDOSCOPY       Past Family Hx:  Reviewed with patient  History reviewed. No pertinent family history. Social Hx:  Reviewed with patient  Social History     Tobacco Use    Smoking status: Former Smoker     Packs/day: 1.50     Years: 25.00     Pack years: 37.50     Types: Cigarettes     Start date: 1961     Quit date: 1986     Years since quittin.6    Smokeless tobacco: Never Used   Substance Use Topics    Alcohol use: Not Currently       OBJECTIVE  /70   Pulse 58   Temp 98 °F (36.7 °C)   Resp 16   Ht 6' (1.829 m)   Wt 206 lb (93.4 kg)   SpO2 97%   BMI 27.94 kg/m²     Problem List:  Nikunj Felix does not have any pertinent problems on file. PHYS EX:  Physical Exam  Vitals and nursing note reviewed. Constitutional:       General: He is not in acute distress. Appearance: Normal appearance. He is well-developed.  He is not ill-appearing, toxic-appearing or diaphoretic. HENT:      Head: Normocephalic and atraumatic. Right Ear: External ear normal. There is no impacted cerumen. Left Ear: External ear normal. There is no impacted cerumen. Nose: Nose normal. No congestion or rhinorrhea. Mouth/Throat:      Mouth: Mucous membranes are moist.      Pharynx: Oropharynx is clear. No oropharyngeal exudate or posterior oropharyngeal erythema. Eyes:      General: No scleral icterus. Right eye: No discharge. Left eye: No discharge. Neck:      Thyroid: No thyromegaly. Vascular: No carotid bruit. Trachea: No tracheal deviation. Cardiovascular:      Rate and Rhythm: Normal rate and regular rhythm. Pulses: Normal pulses. Heart sounds: Normal heart sounds. No murmur heard. No friction rub. No gallop. Pulmonary:      Effort: Pulmonary effort is normal. No respiratory distress. Breath sounds: Normal breath sounds. No stridor. No wheezing, rhonchi or rales. Chest:      Chest wall: No tenderness. Abdominal:      General: Bowel sounds are normal. There is no distension. Palpations: Abdomen is soft. There is no mass. Tenderness: There is no abdominal tenderness. There is no right CVA tenderness, left CVA tenderness, guarding or rebound. Hernia: No hernia is present. Genitourinary:     Penis: No tenderness. Comments: Follows with urology   Musculoskeletal:         General: No swelling, tenderness, deformity or signs of injury. Normal range of motion. Cervical back: Normal range of motion and neck supple. No rigidity. No muscular tenderness. Right lower leg: No edema. Left lower leg: No edema. Lymphadenopathy:      Cervical: No cervical adenopathy. Skin:     General: Skin is warm. Coloration: Skin is not jaundiced or pale. Findings: No bruising, erythema, lesion or rash. Neurological:      General: No focal deficit present.       Mental Status: He is alert and oriented to person, place, and time. Cranial Nerves: No cranial nerve deficit. Sensory: No sensory deficit. Motor: No weakness or abnormal muscle tone. Coordination: Coordination normal.      Gait: Gait normal.      Deep Tendon Reflexes: Reflexes are normal and symmetric. Reflexes normal.         ASSESSMENT/PLAN  Julian was seen today for diabetes and discuss medications. Diagnoses and all orders for this visit:    Type 2 diabetes mellitus without complication, without long-term current use of insulin (HCC)  -     POCT glycosylated hemoglobin (Hb A1C)    ---VASCULAR PANEL  A) ASA, plavix, aggrenox  B) coumadin, pletal, tzd, STATIN  C) ace, hctz, FOLIC, ccb  D) cannikinumab, fish oils     ---CARDIAC---ASA, ace, beta, STATIN, hctz, ( ccb )    ---VASCULAR PANEL  A) asa, plavix, aggrenox  B) coumadin, pletal, tzd, statin  C) ace, hctz, folic, ccb  D) cannikinumab, fish oils     ---CARDIAC---asa, ace, beta, statin, hctz, ( ccb )    COVID-19 long hauler  Long talk on treatment and prevention  Literature is given   --stable on current care planning  -- continue treatment as we are meeting goals       Essential hypertension ---controlled   --patient is instructed on low to moderate sodium ( 2 to 2.5 grams ), daily    Also to increase potassium in the diet to about 3.5 grams daily    Literature is provided       CUCA on CPAP  --PLAN--aerosol accuneb 1.25 plus chest percussion--Rx      Fatigue, unspecified type  Long talk on treatment and prevention  Literature is given       IFG (impaired fasting glucose)  Long talk on treatment and prevention  Literature is given       Other orders  -     aspirin EC 81 MG EC tablet;  Take 1 tablet by mouth daily        Outpatient Encounter Medications as of 8/25/2021   Medication Sig Dispense Refill    aspirin EC 81 MG EC tablet Take 1 tablet by mouth daily 90 tablet 1    vitamin D3 (CHOLECALCIFEROL) 25 MCG (1000 UT) TABS tablet TAKE 1 TABLET BY MOUTH EVERY DAY 90 tablet 1    ACCU-CHEK GUIDE strip TEST 2 TIMES A DAY & AS NEEDED FOR SYMPTOMS OF IRREGULAR BLOOD GLUCOSE. 100 strip 3    glipiZIDE (GLUCOTROL) 5 MG tablet TAKE 1 TABLET EVERY DAY 90 tablet 1    metFORMIN (GLUCOPHAGE) 500 MG tablet TAKE 1 TABLET TWICE DAILY WITH MEALS 180 tablet 0    CPAP Machine MISC by Does not apply route      tadalafil (CIALIS) 5 MG tablet TAKE 1 TABLET BY MOUTH EVERY DAY AS NEEDED      rosuvastatin (CRESTOR) 5 MG tablet Take 1 tablet by mouth nightly 90 tablet 1    folic acid (FOLVITE) 1 MG tablet Take 1 tablet by mouth daily 90 tablet 1    famotidine (PEPCID) 20 MG tablet Take 1 tablet by mouth 2 times daily 60 tablet 11    glucose monitoring kit (FREESTYLE) monitoring kit 1 kit by Does not apply route daily 1 kit 0    Lancets MISC 1 each by Does not apply route daily 100 each 3    hydrocortisone (ANUSOL-HC) 2.5 % CREA rectal cream Apply bid 28 g 3    [DISCONTINUED] aspirin 325 MG EC tablet Take 1 tablet by mouth 2 times daily 30 tablet 3    [DISCONTINUED] ferrous sulfate (IRON 325) 325 (65 Fe) MG tablet Take 1 tablet by mouth daily (with breakfast) 90 tablet 1     No facility-administered encounter medications on file as of 8/25/2021. No follow-ups on file.         Reviewed recent labs related to Julian's current problems      Discussed importance of regular Health Maintenance follow up  Health Maintenance   Topic    AAA screen     Hepatitis C screen     Diabetic foot exam     DTaP/Tdap/Td vaccine (1 - Tdap)    Shingles Vaccine (1 of 2)    Pneumococcal 65+ years Vaccine (1 of 1 - PPSV23)    Flu vaccine (1)    Annual Wellness Visit (AWV)     Diabetic microalbuminuria test     Lipid screen     Potassium monitoring     Creatinine monitoring     PSA counseling     A1C test (Diabetic or Prediabetic)     Diabetic retinal exam     Colon cancer screen colonoscopy     COVID-19 Vaccine     Hepatitis A vaccine     Hib vaccine     Meningococcal (ACWY) vaccine

## 2021-08-31 ENCOUNTER — OFFICE VISIT (OUTPATIENT)
Dept: SLEEP MEDICINE | Age: 71
End: 2021-08-31
Payer: MEDICARE

## 2021-08-31 VITALS
SYSTOLIC BLOOD PRESSURE: 131 MMHG | WEIGHT: 206.3 LBS | DIASTOLIC BLOOD PRESSURE: 80 MMHG | OXYGEN SATURATION: 98 % | HEART RATE: 56 BPM | BODY MASS INDEX: 27.94 KG/M2 | HEIGHT: 72 IN | RESPIRATION RATE: 16 BRPM

## 2021-08-31 DIAGNOSIS — E66.3 OVERWEIGHT: ICD-10-CM

## 2021-08-31 DIAGNOSIS — Z72.821 POOR SLEEP HYGIENE: ICD-10-CM

## 2021-08-31 DIAGNOSIS — Z99.89 OSA ON CPAP: Primary | ICD-10-CM

## 2021-08-31 DIAGNOSIS — G47.33 OSA ON CPAP: Primary | ICD-10-CM

## 2021-08-31 PROCEDURE — G8427 DOCREV CUR MEDS BY ELIG CLIN: HCPCS | Performed by: NURSE PRACTITIONER

## 2021-08-31 PROCEDURE — 99213 OFFICE O/P EST LOW 20 MIN: CPT | Performed by: NURSE PRACTITIONER

## 2021-08-31 PROCEDURE — 1123F ACP DISCUSS/DSCN MKR DOCD: CPT | Performed by: NURSE PRACTITIONER

## 2021-08-31 PROCEDURE — G8417 CALC BMI ABV UP PARAM F/U: HCPCS | Performed by: NURSE PRACTITIONER

## 2021-08-31 PROCEDURE — 3017F COLORECTAL CA SCREEN DOC REV: CPT | Performed by: NURSE PRACTITIONER

## 2021-08-31 PROCEDURE — 4040F PNEUMOC VAC/ADMIN/RCVD: CPT | Performed by: NURSE PRACTITIONER

## 2021-08-31 PROCEDURE — 1036F TOBACCO NON-USER: CPT | Performed by: NURSE PRACTITIONER

## 2021-08-31 ASSESSMENT — SLEEP AND FATIGUE QUESTIONNAIRES
HOW LIKELY ARE YOU TO NOD OFF OR FALL ASLEEP WHEN YOU ARE A PASSENGER IN A CAR FOR AN HOUR WITHOUT A BREAK: 0
HOW LIKELY ARE YOU TO NOD OFF OR FALL ASLEEP IN A CAR, WHILE STOPPED FOR A FEW MINUTES IN TRAFFIC: 0
HOW LIKELY ARE YOU TO NOD OFF OR FALL ASLEEP WHILE SITTING AND READING: 0
HOW LIKELY ARE YOU TO NOD OFF OR FALL ASLEEP WHILE SITTING AND TALKING TO SOMEONE: 0
HOW LIKELY ARE YOU TO NOD OFF OR FALL ASLEEP WHILE SITTING INACTIVE IN A PUBLIC PLACE: 0
HOW LIKELY ARE YOU TO NOD OFF OR FALL ASLEEP WHILE SITTING QUIETLY AFTER LUNCH WITHOUT ALCOHOL: 3
HOW LIKELY ARE YOU TO NOD OFF OR FALL ASLEEP WHILE LYING DOWN TO REST IN THE AFTERNOON WHEN CIRCUMSTANCES PERMIT: 3
HOW LIKELY ARE YOU TO NOD OFF OR FALL ASLEEP WHILE WATCHING TV: 0
ESS TOTAL SCORE: 6

## 2021-08-31 NOTE — PROGRESS NOTES
[facility] Sleep Medicine    Patient Name: Tamar Garcia  Age: 66 y.o.   : 1950    Date of Visit: 21        Review of Last Visit Summary:    The patient was last seen on 2021 by Dr. Joelle Solis for  Obstructive Sleep Apnea and Obesity. Interim History:     Tamar Garcia is a 66 y.o. male that  has a past medical history of Diabetes mellitus ([address] He presents in follow up to Sleep Clinic to review CPAP adherence and efficacy. Patient presents as a follow-up after beginning CPAP therapy several months ago. He is compliant with use and wears device on average around 5 hours and 15 minutes a night. He originally started out on a full facemask, but switched to a nasal pillow mask due to discomfort. He does not appreciate a significant amount of leak with the nasal pillows, however, he feels restricted in his position changes while sleeping because the mask often comes dislodged and needs to be readjusted. He is comfortable with the pressures. Although patient uses CPAP every night, he is not quite used to it and feels slightly discomfort with using the device in general.  He cannot pinpoint what exactly is the issue, but does display challenges with comfort overall. Despite this, he is motivated and willing to continue use because he understands the long-term benefit. Since using CPAP, patient's girlfriend has not complained about issues with him snoring. He is a back sleeper and is able to tolerate this position. It does not take patient long to initiate or fall asleep and he does not wake up frequently throughout the night. Patient also increased his time spent sleeping at night since using his CPAP, previously he only slept 3 to 4 hours a night and now he is sleeping 5. He states he does not require a lot of sleep  He does not take regular naps during the day, only when he is bored.   He is happy with [facility] as his DME.      DME: [facility]  Benefits: Sleeping 2 times daily, Disp: 60 tablet, Rfl: 11    glucose monitoring kit (FREESTYLE) monitoring kit, 1 kit by Does not apply route daily, Disp: 1 kit, Rfl: 0    Lancets MISC, 1 each by Does not apply route daily, Disp: 100 each, Rfl: 3    hydrocortisone (ANUSOL-HC) 2.5 % CREA rectal cream, Apply bid, Disp: 28 g, Rfl: 3    Sleep Medicine 8/31/2021 6/23/2021   Sitting and reading 0 0   Watching TV 0 3   Sitting, inactive in a public place (e.g. a theatre or a meeting) 0 0   As a passenger in a car for an hour without a break 0 0   Lying down to rest in the afternoon when circumstances permit 3 1   Sitting and talking to someone 0 0   Sitting quietly after a lunch without alcohol 3 3   In a car, while stopped for a few minutes in traffic 0 0   Total score 6 7   Neck circumference - 16       Review of Systems:    Constitutional: no chills, no fever   Eyes: no blurred vision and no eyesight problems. ENT: no hoarseness and no sore throat. Cardiovascular: no chest pain,   Respiratory: no cough, no shortness of breath   Gastrointestinal:  no nausea,  no vomiting, no diarrhea. Musculoskeletal: no arthralgias, no back pain and no myalgias. Integumentary: no rashes or lacerations. Neurological:  no diplopia, no dizziness,  no headache, no memory changes,  and no tingling. Endocrine: No chills      Objective:   PHYSICAL EXAM:    /80 (Site: Left Upper Arm, Position: Sitting, Cuff Size: Large Adult)   Pulse 56   Resp 16   Ht 6' (1.829 m)   Wt 206 lb 4.8 oz (93.6 kg)   SpO2 98%   BMI 27.98 kg/m²     Physical exam:  Gen: No acute distress. BMI of Body mass index is 27.98 kg/m². Eyes: PERRL. No sclera icterus. No conjunctival injection. ENT: No nasal/oral discharge. Pharynx clear. Neck: Trachea midline. No obvious mass. Neck circumference     Resp: No accessory muscle use. No crackles. No wheezes. No rhonchi. CV: Regular rate. Regular rhythm. No murmur or rub. Skin: Warm and dry.  No bleeding observed M/S: No cyanosis. No obvious joint deformity. Neuro: Awake. Alert. Moves all four extremities. Psych: Alert and oriented. No anxiety. CPAP Compliance Download -- accessed via [employer] 8/31/2021        Assessment:      Macey Ramsay was seen today for sleep apnea. Diagnoses and all orders for this visit:    CUCA on CPAP  -     DME Order for CPAP as OP  -     DME Order for CPAP as OP  -     DME Order for CPAP as OP    Poor sleep hygiene    Overweight    ·    Plan:     Gail Cameron is a 66 y.o. male that  has a past medical history of Diabetes mellitus ([facility] [address] He presents in follow up to Sleep Clinic to review CPAP adherence and efficacy. He demonstrates excellent adherence with resolution of respiratory events (residual AHI 1.0). He notes benefit with CPAP therapy (namely, longer sleep periods and resolution of snoring) and is motivated to continue use. 1. Obstructive Sleep Apnea    -Based on sleep study results, review of device remote download, and discussion with patient, will continue auto-CPAP therapy at adjusted/settings of 5-12 cm of water. - Settings remotely adjusted in [facility]. Prescription will be sent to DME  - DME [facility].  - Patient is using a nasal pillow mask. He notes no significant leak, however, he is still slightly uncomfortable with the mask and CPAP in general.   -Patient may benefit from trying a new style of mask.   -Order placed for supply order as well as mask fitting when he is eligible for a new mask. -Previously discussed pathophysiology of CUCA and its impact on daily well-being, as well as cardiometabolic and neurocognitive health (particularly in moderate-severe cases). -Patient understands that CPAP should be worn every night for the duration of the night (in order to not miss therapy during early-morning REM period) for maximum benefit.      2. Poor Sleep Hygiene    -Notes improvement with more regular bed and awake times.  -He does still watch TV upon falling asleep.  -Did advise for patient to wear his CPAP even when taking naps during the day. 3. Overweight. Body mass index is 27.98 kg/m². -Previously discussed impact of weight gain on CUCA severity. Patient understands that CUCA severity may improve with weight loss but no guarantee of cure can be made. Follow up: Return in about 5 months (around 1/31/2022) for Follow up for sleep apnea.     Dayanna Braden, [facility]  [address] [facility]  P -[phone] option 2  F- [phone]

## 2021-08-31 NOTE — PATIENT INSTRUCTIONS
It was a pleasure meeting you today Franc Pineda! Summary of Today's Visit     -We adjusted settings to 5-12 cm H2O.  -Put in order for mask fitting and year supply order. Please call our sleep nurses to schedule a follow up at - [phone] option 2              1. Continue using your machine nightly        -Request all data downloads be sent to my nurse        2. Contact your DME company about supplies or issues with your machine. As a general guideline, please replace your:  -CPAP mask every 3-6 months  -CPAP hose  every 3-6 months  -Filter every 2-4 weeks  -CPAP/BiPAP/ASV replacements every 5-7+ years     3. Continue healthy weight loss/stabilization, as this is recommended as a long-term intervention. 4. Please do not drive or operate machinery when you feel fatigued/sleepy/drowsy      5. Please try to sleep between 6-8 hours nightly with the CPAP mask    6. Please avoid sedatives, alcohol and caffeinated drinks at/near bed time. 7. Please call your supply (DME) company with any issues with your PAP device. Please call our office with any issues pertaining to the therapy.   ----Please note that complications of CUCA if not treated can increase risk for: systemic hypertension, pulmonary hypertension, cardiovascular morbidities (heart attack and stroke), car accidents and all cause mortality. 8. Please note that complications of CUCA if not treated can increase risk for: systemic hypertension , pulmonary hypertension (high blood pressure in the lungs), cardiovascular morbidities (heart attack and stroke), car accidents and all cause mortality (increased risk of death).       For general questions or scheduling issues, call my nurse at [phone] option John Roberts, Shai [phone] option 2  F- [phone]         ----------------------------------------------------------    Common Issues and Solutions     Pillow is dislodging mask - Consider getting a CPAP pillow or switching to a mask with the hose at the top. Dry Mouth - Adjust Humidity and/or try Biotene Gel. (Excessive leak can also cause this)     Leak - Please call your equipment provider  as they may need to adjust your mask. Difficulty tolerating the PAP mask - Contact your equipment company to try a different mask, we can order a \"mini sleep study\"with the sleep tech to try different masks/settings of pressure, also we have a sleep psychologist to help with anxiety related to wearing the PAP mask      ----------------------------------------------------------     For Questions and Concerns: In case of difficulty with your PAP device or mask interface, please FIRST contact your 2 [address] (The company who provides you the CPAP/BiPAP/ASV machine/supplies).      If you need the number for any other DME company, please call my Nurse at [phone] option 2     For questions concerning your [place] appointment: Call [phone] option 2  SLEEP LAB SCHEDULING:        ----------------------------------------------------------

## 2021-09-29 DIAGNOSIS — E11.9 TYPE 2 DIABETES MELLITUS WITHOUT COMPLICATION, WITHOUT LONG-TERM CURRENT USE OF INSULIN (HCC): ICD-10-CM

## 2021-09-29 DIAGNOSIS — E78.5 DYSLIPIDEMIA: ICD-10-CM

## 2021-09-29 RX ORDER — ROSUVASTATIN CALCIUM 5 MG/1
TABLET, COATED ORAL
Qty: 90 TABLET | Refills: 1 | Status: SHIPPED
Start: 2021-09-29 | End: 2022-10-10 | Stop reason: SDUPTHER

## 2021-09-29 RX ORDER — FOLIC ACID 1 MG/1
TABLET ORAL
Qty: 90 TABLET | Refills: 1 | Status: SHIPPED
Start: 2021-09-29 | End: 2022-10-10 | Stop reason: SDUPTHER

## 2021-11-18 RX ORDER — FERROUS SULFATE 325(65) MG
TABLET ORAL
Qty: 90 TABLET | Refills: 0 | OUTPATIENT
Start: 2021-11-18

## 2021-12-08 DIAGNOSIS — E11.9 TYPE 2 DIABETES MELLITUS WITHOUT COMPLICATION, WITHOUT LONG-TERM CURRENT USE OF INSULIN (HCC): ICD-10-CM

## 2021-12-10 RX ORDER — BLOOD SUGAR DIAGNOSTIC
STRIP MISCELLANEOUS
Qty: 100 STRIP | Refills: 3 | Status: SHIPPED
Start: 2021-12-10 | End: 2022-10-10 | Stop reason: SDUPTHER

## 2021-12-17 ENCOUNTER — IMMUNIZATION (OUTPATIENT)
Dept: PRIMARY CARE CLINIC | Age: 71
End: 2021-12-17
Payer: MEDICARE

## 2021-12-17 PROCEDURE — 91306 COVID-19, MODERNA BOOSTER VACCINE 0.25ML DOSE: CPT | Performed by: NURSE PRACTITIONER

## 2021-12-17 PROCEDURE — 0064A COVID-19, MODERNA BOOSTER VACCINE 0.25ML DOSE: CPT | Performed by: NURSE PRACTITIONER

## 2022-04-05 DIAGNOSIS — E11.9 TYPE 2 DIABETES MELLITUS WITHOUT COMPLICATION, WITHOUT LONG-TERM CURRENT USE OF INSULIN (HCC): ICD-10-CM

## 2022-04-06 RX ORDER — GLIPIZIDE 5 MG/1
TABLET ORAL
Qty: 90 TABLET | Refills: 1 | Status: SHIPPED
Start: 2022-04-06 | End: 2022-09-02

## 2022-04-06 RX ORDER — FERROUS SULFATE 325(65) MG
TABLET ORAL
Qty: 90 TABLET | Refills: 1 | Status: SHIPPED
Start: 2022-04-06 | End: 2022-09-28

## 2022-09-01 DIAGNOSIS — E11.9 TYPE 2 DIABETES MELLITUS WITHOUT COMPLICATION, WITHOUT LONG-TERM CURRENT USE OF INSULIN (HCC): ICD-10-CM

## 2022-09-01 NOTE — TELEPHONE ENCOUNTER
Last seen 8/25/2021  Next appt Visit date not found    Called pt. Pt states that he moved to [place] for work and is in the process of looking for a new doctor down there and is asking if these can be refilled until he is able to find a new pcp.     Electronically signed by Clarissa Moss MA on 9/1/22 at 9:01 AM EDT

## 2022-09-02 RX ORDER — GLIPIZIDE 5 MG/1
TABLET ORAL
Qty: 90 TABLET | Refills: 1 | Status: SHIPPED
Start: 2022-09-02 | End: 2022-10-10 | Stop reason: SDUPTHER

## 2022-09-28 RX ORDER — FERROUS SULFATE 325(65) MG
TABLET ORAL
Qty: 90 TABLET | Refills: 1 | Status: SHIPPED
Start: 2022-09-28 | End: 2022-10-10 | Stop reason: SDUPTHER

## 2022-10-10 ENCOUNTER — OFFICE VISIT (OUTPATIENT)
Dept: FAMILY MEDICINE CLINIC | Age: 72
End: 2022-10-10
Payer: MEDICARE

## 2022-10-10 VITALS
DIASTOLIC BLOOD PRESSURE: 76 MMHG | RESPIRATION RATE: 16 BRPM | SYSTOLIC BLOOD PRESSURE: 124 MMHG | OXYGEN SATURATION: 96 % | BODY MASS INDEX: 26.82 KG/M2 | TEMPERATURE: 98.4 F | HEIGHT: 72 IN | HEART RATE: 81 BPM | WEIGHT: 198 LBS

## 2022-10-10 DIAGNOSIS — Z00.00 ENCOUNTER FOR MEDICARE ANNUAL WELLNESS EXAM: Primary | ICD-10-CM

## 2022-10-10 DIAGNOSIS — Z12.5 SCREENING PSA (PROSTATE SPECIFIC ANTIGEN): ICD-10-CM

## 2022-10-10 DIAGNOSIS — E11.9 TYPE 2 DIABETES MELLITUS WITHOUT COMPLICATION, WITHOUT LONG-TERM CURRENT USE OF INSULIN (HCC): ICD-10-CM

## 2022-10-10 DIAGNOSIS — R53.83 OTHER FATIGUE: ICD-10-CM

## 2022-10-10 DIAGNOSIS — Z00.00 MEDICARE ANNUAL WELLNESS VISIT, SUBSEQUENT: ICD-10-CM

## 2022-10-10 DIAGNOSIS — E78.5 DYSLIPIDEMIA: ICD-10-CM

## 2022-10-10 PROBLEM — U07.1 PNEUMONIA DUE TO COVID-19 VIRUS: Status: RESOLVED | Noted: 2020-12-21 | Resolved: 2022-10-10

## 2022-10-10 PROBLEM — J12.82 PNEUMONIA DUE TO COVID-19 VIRUS: Status: RESOLVED | Noted: 2020-12-21 | Resolved: 2022-10-10

## 2022-10-10 PROBLEM — I26.99 PULMONARY EMBOLUS (HCC): Status: RESOLVED | Noted: 2021-01-15 | Resolved: 2022-10-10

## 2022-10-10 PROCEDURE — 3046F HEMOGLOBIN A1C LEVEL >9.0%: CPT | Performed by: FAMILY MEDICINE

## 2022-10-10 PROCEDURE — 3017F COLORECTAL CA SCREEN DOC REV: CPT | Performed by: FAMILY MEDICINE

## 2022-10-10 PROCEDURE — 1123F ACP DISCUSS/DSCN MKR DOCD: CPT | Performed by: FAMILY MEDICINE

## 2022-10-10 PROCEDURE — G0439 PPPS, SUBSEQ VISIT: HCPCS | Performed by: FAMILY MEDICINE

## 2022-10-10 PROCEDURE — G8484 FLU IMMUNIZE NO ADMIN: HCPCS | Performed by: FAMILY MEDICINE

## 2022-10-10 RX ORDER — FOLIC ACID 1 MG/1
TABLET ORAL
Qty: 90 TABLET | Refills: 1 | Status: SHIPPED | OUTPATIENT
Start: 2022-10-10

## 2022-10-10 RX ORDER — ROSUVASTATIN CALCIUM 5 MG/1
TABLET, COATED ORAL
Qty: 90 TABLET | Refills: 1 | Status: SHIPPED | OUTPATIENT
Start: 2022-10-10

## 2022-10-10 RX ORDER — ASPIRIN 81 MG/1
81 TABLET ORAL DAILY
Qty: 90 TABLET | Refills: 1 | Status: SHIPPED | OUTPATIENT
Start: 2022-10-10

## 2022-10-10 RX ORDER — FAMOTIDINE 20 MG/1
20 TABLET, FILM COATED ORAL 2 TIMES DAILY
Qty: 60 TABLET | Refills: 11 | Status: SHIPPED
Start: 2022-10-10 | End: 2022-11-01

## 2022-10-10 RX ORDER — GLIPIZIDE 5 MG/1
TABLET ORAL
Qty: 90 TABLET | Refills: 1 | Status: SHIPPED | OUTPATIENT
Start: 2022-10-10

## 2022-10-10 RX ORDER — VALACYCLOVIR HYDROCHLORIDE 500 MG/1
500 TABLET, FILM COATED ORAL DAILY
Qty: 90 TABLET | Refills: 1 | Status: SHIPPED | OUTPATIENT
Start: 2022-10-10

## 2022-10-10 RX ORDER — FERROUS SULFATE 325(65) MG
TABLET ORAL
Qty: 90 TABLET | Refills: 1 | Status: SHIPPED | OUTPATIENT
Start: 2022-10-10

## 2022-10-10 RX ORDER — MELATONIN
Qty: 90 TABLET | Refills: 1 | Status: SHIPPED | OUTPATIENT
Start: 2022-10-10

## 2022-10-10 RX ORDER — BLOOD SUGAR DIAGNOSTIC
STRIP MISCELLANEOUS
Qty: 100 STRIP | Refills: 3 | Status: SHIPPED | OUTPATIENT
Start: 2022-10-10

## 2022-10-10 RX ORDER — LANCETS 30 GAUGE
1 EACH MISCELLANEOUS DAILY
Qty: 100 EACH | Refills: 3 | Status: SHIPPED | OUTPATIENT
Start: 2022-10-10

## 2022-10-10 RX ORDER — VALACYCLOVIR HYDROCHLORIDE 500 MG/1
500 TABLET, FILM COATED ORAL DAILY
COMMUNITY
End: 2022-10-10 | Stop reason: SDUPTHER

## 2022-10-10 RX ORDER — TADALAFIL 5 MG/1
TABLET ORAL
Qty: 30 TABLET | Refills: 0 | Status: SHIPPED | OUTPATIENT
Start: 2022-10-10

## 2022-10-10 SDOH — ECONOMIC STABILITY: FOOD INSECURITY: WITHIN THE PAST 12 MONTHS, YOU WORRIED THAT YOUR FOOD WOULD RUN OUT BEFORE YOU GOT MONEY TO BUY MORE.: NEVER TRUE

## 2022-10-10 SDOH — ECONOMIC STABILITY: FOOD INSECURITY: WITHIN THE PAST 12 MONTHS, THE FOOD YOU BOUGHT JUST DIDN'T LAST AND YOU DIDN'T HAVE MONEY TO GET MORE.: NEVER TRUE

## 2022-10-10 ASSESSMENT — PATIENT HEALTH QUESTIONNAIRE - PHQ9
1. LITTLE INTEREST OR PLEASURE IN DOING THINGS: 0
2. FEELING DOWN, DEPRESSED OR HOPELESS: 0
SUM OF ALL RESPONSES TO PHQ QUESTIONS 1-9: 0
SUM OF ALL RESPONSES TO PHQ9 QUESTIONS 1 & 2: 0

## 2022-10-10 ASSESSMENT — SOCIAL DETERMINANTS OF HEALTH (SDOH): HOW HARD IS IT FOR YOU TO PAY FOR THE VERY BASICS LIKE FOOD, HOUSING, MEDICAL CARE, AND HEATING?: NOT HARD AT ALL

## 2022-10-10 ASSESSMENT — LIFESTYLE VARIABLES: HOW OFTEN DO YOU HAVE A DRINK CONTAINING ALCOHOL: NEVER

## 2022-10-10 NOTE — PROGRESS NOTES
Medicare Annual Wellness Visit    Dede Garcia is here for Medicare AWV (Pt here for AWV. ) and Medication Refill (Pharmacy verified and meds pended. )    Assessment & Plan   Encounter for Medicare annual wellness exam    ---VASCULAR PANEL  A) ASA, plavix, aggrenox  B) coumadin, pletal, tzd, STATIN  C) ace, hctz, FOLIC, ccb  D) cannikinumab, fish oils     ---CARDIAC---ASA, ace, beta, STATIN, hctz, ( ccb )    A) ace or arb  B) lipitor ( 40-80 ) or CRESTOR 5  C) glp-1 or sglt 2     Type 2 diabetes mellitus without complication, without long-term current use of insulin (HCC)  -     glipiZIDE (GLUCOTROL) 5 MG tablet; TAKE 1 TABLET EVERY DAY, Disp-90 tablet, R-1Normal  -     blood glucose test strips (ACCU-CHEK GUIDE) strip; As needed. , Disp-100 strip, [id number]  -     folic acid (FOLVITE) 1 MG tablet; TAKE 1 TABLET EVERY DAY, Disp-90 tablet, R-1Normal  -     metFORMIN (GLUCOPHAGE) 500 MG tablet; TAKE 1 TABLET TWICE DAILY WITH MEALS, Disp-180 tablet, R-1Normal  -     vitamin D3 (CHOLECALCIFEROL) 25 MCG (1000 UT) TABS tablet; TAKE 1 TABLET BY MOUTH EVERY DAY, Disp-90 tablet, R-1Normal  -     Lancets MISC; DAILY Starting Mon 10/10/2022, Disp-100 each, R-3, Normal  -     Comprehensive Metabolic Panel; Future  -     CBC with Auto Differential; Future  -     Hemoglobin A1C; Future  Long talk on treatment and prevention  Literature is given     Dyslipidemia  -     rosuvastatin (CRESTOR) 5 MG tablet; TAKE 1 TABLET EVERY NIGHT, Disp-90 tablet, R-1Normal  -     Comprehensive Metabolic Panel; Future  -     Lipid Panel; Future  -     CBC with Auto Differential; Future  Other fatigue  -     TSH; Future  -     Uric Acid; Future  -     Comprehensive Metabolic Panel; Future  -     CBC with Auto Differential; Future  Screening PSA (prostate specific antigen)  -     PSA Screening; Future  -     PSA Screening;  Future    Recommendations for Preventive Services Due: see orders and patient instructions/AVS.  Recommended screening schedule for the next 5-10 years is provided to the patient in written form: see Patient Instructions/AVS.     No follow-ups on file. Subjective   The following acute and/or chronic problems were also addressed today:    Patient's complete Health Risk Assessment and screening values have been reviewed and are found in Flowsheets. The following problems were reviewed today and where indicated follow up appointments were made and/or referrals ordered.     Positive Risk Factor Screenings with Interventions:             General Health and ACP:  General  In general, how would you say your health is?: Very Good  In the past 7 days, have you experienced any of the following: New or Increased Pain, New or Increased Fatigue, Loneliness, Social Isolation, Stress or Anger?: No  Do you get the social and emotional support that you need?: Yes  Do you have a Living Will?: (!) No    Advance Directives       Power of  Living Will ACP-Advance Directive ACP-Power of     Not on File Not on File Not on File Not on File        General Health Risk Interventions:  He feels very good and is back to work     Health Habits/Nutrition:  Physical Activity: Sufficiently Active    Days of Exercise per Week: 7 days    Minutes of Exercise per Session: 60 min     Have you lost any weight without trying in the past 3 months?: No  Body mass index: (!) 26.85  Have you seen the dentist within the past year?: (!) No  Health Habits/Nutrition Interventions:  No acute issues      Safety:  Do you have working smoke detectors?: Yes  Do you have any tripping hazards - loose or unsecured carpets or rugs?: No  Do you have any tripping hazards - clutter in doorways, halls, or stairs?: No  Do you have either shower bars, grab bars, non-slip mats or non-slip surfaces in your shower or bathtub?: (!) No  Do all of your stairways have a railing or banister?: Yes  Do you always fasten your seatbelt when you are in a car?: Yes  Safety Interventions:  Home safety tips provided           Objective   Vitals:    10/10/22 0953   BP: 124/76   Pulse: 81   Resp: 16   Temp: 98.4 °F (36.9 °C)   SpO2: 96%   Weight: 198 lb (89.8 kg)   Height: 6' (1.829 m)      Body mass index is 26.85 kg/m². General Appearance: alert and oriented to person, place and time, well developed and well- nourished, in no acute distress  Skin: warm and dry, no rash or erythema  Head: normocephalic and atraumatic  Eyes: pupils equal, round, and reactive to light, extraocular eye movements intact, conjunctivae normal  ENT: tympanic membrane, external ear and ear canal normal bilaterally, nose without deformity, nasal mucosa and turbinates normal without polyps  Neck: supple and non-tender without mass, no thyromegaly or thyroid nodules, no cervical lymphadenopathy  Pulmonary/Chest: clear to auscultation bilaterally- no wheezes, rales or rhonchi, normal air movement, no respiratory distress  Cardiovascular: normal rate, regular rhythm, normal S1 and S2, no murmurs, rubs, clicks, or gallops, distal pulses intact, no carotid bruits  Abdomen: soft, non-tender, non-distended, normal bowel sounds, no masses or organomegaly  Genitourinary/Rectal: genitals normal without hernia or inguinal adenopathy, rectal normal without masses, prostate normal in size without masses or nodules  Extremities: no cyanosis, clubbing or edema  Musculoskeletal: normal range of motion, no joint swelling, deformity or tenderness  Neurologic: reflexes normal and symmetric, no cranial nerve deficit, gait, coordination and speech normal       No Known Allergies  Prior to Visit Medications    Medication Sig Taking?  Authorizing Provider   valACYclovir (VALTREX) 500 MG tablet Take 1 tablet by mouth daily Yes Sunday Ferrerlin, DO   ferrous sulfate (FEROSUL) 325 (65 Fe) MG tablet TAKE 1 TABLET BY MOUTH DAILY (WITH BREAKFAST) Yes Sunday Gusman, DO   glipiZIDE (GLUCOTROL) 5 MG tablet TAKE 1 TABLET EVERY DAY Yes Zahraa Jack Naseem, DO   blood glucose test strips (ACCU-CHEK GUIDE) strip As needed.  Yes Libia Gusman DO   rosuvastatin (CRESTOR) 5 MG tablet TAKE 1 TABLET EVERY NIGHT Yes Sunday Gusman DO   folic acid (FOLVITE) 1 MG tablet TAKE 1 TABLET EVERY DAY Yes Libia Gusman DO   metFORMIN (GLUCOPHAGE) 500 MG tablet TAKE 1 TABLET TWICE DAILY WITH MEALS Yes Sunday Gusman DO   aspirin EC 81 MG EC tablet Take 1 tablet by mouth daily Yes Sunday Gusman DO   vitamin D3 (CHOLECALCIFEROL) 25 MCG (1000 UT) TABS tablet TAKE 1 TABLET BY MOUTH EVERY DAY Yes Sunday Gusman DO   tadalafil (CIALIS) 5 MG tablet TAKE 1 TABLET BY MOUTH EVERY DAY AS NEEDED Yes Sunday Gusman DO   famotidine (PEPCID) 20 MG tablet Take 1 tablet by mouth 2 times daily Yes Sunday Gusman DO   Lancets MISC 1 each by Does not apply route daily Yes Sunday Gusman DO   CPAP Machine MISC by Does not apply route Yes Historical Provider, MD   glucose monitoring kit (FREESTYLE) monitoring kit 1 kit by Does not apply route daily Yes Libia Gusman DO   hydrocortisone (ANUSOL-HC) 2.5 % CREA rectal cream Apply bid Yes Sunday Joshi DO       CareTeam (Including outside providers/suppliers regularly involved in providing care):   Patient Care Team:  Bay Baumann DO as PCP - General (Family Medicine)  Bay Baumann [facility]     Reviewed and updated this visit:  Tobacco  Allergies  Meds  Problems  Med Hx  Surg Hx  Soc Hx  Fam Hx

## 2022-10-10 NOTE — PATIENT INSTRUCTIONS
Personalized Preventive Plan for Abdias Singh - 10/10/2022  Medicare offers a range of preventive health benefits. Some of the tests and screenings are paid in full while other may be subject to a deductible, co-insurance, and/or copay. Some of these benefits include a comprehensive review of your medical history including lifestyle, illnesses that may run in your family, and various assessments and screenings as appropriate. After reviewing your medical record and screening and assessments performed today your provider may have ordered immunizations, labs, imaging, and/or referrals for you. A list of these orders (if applicable) as well as your Preventive Care list are included within your After Visit Summary for your review. Other Preventive Recommendations:    A preventive eye exam performed by an eye specialist is recommended every 1-2 years to screen for glaucoma; cataracts, macular degeneration, and other eye disorders. A preventive dental visit is recommended every 6 months. Try to get at least 150 minutes of exercise per week or 10,000 steps per day on a pedometer . Order or download the FREE \"Exercise & Physical Activity: Your Everyday Guide\" from [employer]. Call 6-181.500.6794 or search [employer] online. You need 9539-8745 mg of calcium and 8265-2211 IU of vitamin D per day. It is possible to meet your calcium requirement with diet alone, but a vitamin D supplement is usually necessary to meet this goal.  When exposed to the sun, use a sunscreen that protects against both UVA and UVB radiation with an SPF of 30 or greater. Reapply every 2 to 3 hours or after sweating, drying off with a towel, or swimming. Always wear a seat belt when traveling in a car. Always wear a helmet when riding a bicycle or motorcycle.

## 2022-10-17 ENCOUNTER — HOSPITAL ENCOUNTER (OUTPATIENT)
Age: 72
Discharge: HOME | End: 2022-10-17
Payer: MEDICARE

## 2022-10-17 DIAGNOSIS — E78.5 DYSLIPIDEMIA: ICD-10-CM

## 2022-10-17 DIAGNOSIS — R53.83 OTHER FATIGUE: ICD-10-CM

## 2022-10-17 DIAGNOSIS — E11.9 TYPE 2 DIABETES MELLITUS WITHOUT COMPLICATION, WITHOUT LONG-TERM CURRENT USE OF INSULIN (HCC): ICD-10-CM

## 2022-10-17 DIAGNOSIS — Z12.5 SCREENING PSA (PROSTATE SPECIFIC ANTIGEN): ICD-10-CM

## 2022-10-17 LAB
ALBUMIN SERPL-MCNC: 4.2 G/DL (ref 3.5–5.2)
ALP BLD-CCNC: 76 U/L (ref 40–129)
ALT SERPL-CCNC: 13 U/L (ref 0–40)
ANION GAP SERPL CALCULATED.3IONS-SCNC: 10 MMOL/L (ref 7–16)
AST SERPL-CCNC: 13 U/L (ref 0–39)
BASOPHILS ABSOLUTE: 0.07 E9/L (ref 0–0.2)
BASOPHILS RELATIVE PERCENT: 0.7 % (ref 0–2)
BILIRUB SERPL-MCNC: 0.4 MG/DL (ref 0–1.2)
BUN BLDV-MCNC: 15 MG/DL (ref 6–23)
CALCIUM SERPL-MCNC: 9.6 MG/DL (ref 8.6–10.2)
CHLORIDE BLD-SCNC: 102 MMOL/L (ref 98–107)
CHOLESTEROL, TOTAL: 119 MG/DL (ref 0–199)
CO2: 27 MMOL/L (ref 22–29)
CREAT SERPL-MCNC: 1.1 MG/DL (ref 0.7–1.2)
EOSINOPHILS ABSOLUTE: 0.13 E9/L (ref 0.05–0.5)
EOSINOPHILS RELATIVE PERCENT: 1.3 % (ref 0–6)
GFR AFRICAN AMERICAN: >60
GFR SERPL CREATININE-BSD FRML MDRD: >60 ML/MIN/1.73
GLUCOSE BLD-MCNC: 200 MG/DL (ref 74–99)
HBA1C MFR BLD: 8.9 % (ref 4–5.6)
HCT VFR BLD CALC: 44.1 % (ref 37–54)
HDLC SERPL-MCNC: 51 MG/DL
HEMOGLOBIN: 15 G/DL (ref 12.5–16.5)
IMMATURE GRANULOCYTES #: 0.04 E9/L
IMMATURE GRANULOCYTES %: 0.4 % (ref 0–5)
LDL CHOLESTEROL CALCULATED: 52 MG/DL (ref 0–99)
LYMPHOCYTES ABSOLUTE: 2.69 E9/L (ref 1.5–4)
LYMPHOCYTES RELATIVE PERCENT: 27.8 % (ref 20–42)
MCH RBC QN AUTO: 29.6 PG (ref 26–35)
MCHC RBC AUTO-ENTMCNC: 34 % (ref 32–34.5)
MCV RBC AUTO: 87 FL (ref 80–99.9)
MONOCYTES ABSOLUTE: 0.79 E9/L (ref 0.1–0.95)
MONOCYTES RELATIVE PERCENT: 8.2 % (ref 2–12)
NEUTROPHILS ABSOLUTE: 5.94 E9/L (ref 1.8–7.3)
NEUTROPHILS RELATIVE PERCENT: 61.6 % (ref 43–80)
PDW BLD-RTO: 12.5 FL (ref 11.5–15)
PLATELET # BLD: 296 E9/L (ref 130–450)
PMV BLD AUTO: 10.1 FL (ref 7–12)
POTASSIUM SERPL-SCNC: 4.9 MMOL/L (ref 3.5–5)
PROSTATE SPECIFIC ANTIGEN: 4.44 NG/ML (ref 0–4)
RBC # BLD: 5.07 E12/L (ref 3.8–5.8)
SODIUM BLD-SCNC: 139 MMOL/L (ref 132–146)
TOTAL PROTEIN: 7.2 G/DL (ref 6.4–8.3)
TRIGL SERPL-MCNC: 79 MG/DL (ref 0–149)
TSH SERPL DL<=0.05 MIU/L-ACNC: 1.97 UIU/ML (ref 0.27–4.2)
URIC ACID, SERUM: 4.9 MG/DL (ref 3.4–7)
VLDLC SERPL CALC-MCNC: 16 MG/DL
WBC # BLD: 9.7 E9/L (ref 4.5–11.5)

## 2022-10-17 PROCEDURE — 80053 COMPREHEN METABOLIC PANEL: CPT

## 2022-10-17 PROCEDURE — 80061 LIPID PANEL: CPT

## 2022-10-17 PROCEDURE — 83036 HEMOGLOBIN GLYCOSYLATED A1C: CPT

## 2022-10-17 PROCEDURE — 84550 ASSAY OF BLOOD/URIC ACID: CPT

## 2022-10-17 PROCEDURE — G0103 PSA SCREENING: HCPCS

## 2022-10-17 PROCEDURE — 84443 ASSAY THYROID STIM HORMONE: CPT

## 2022-10-17 PROCEDURE — 85025 COMPLETE CBC W/AUTO DIFF WBC: CPT

## 2022-10-17 PROCEDURE — 36415 COLL VENOUS BLD VENIPUNCTURE: CPT

## 2022-10-19 DIAGNOSIS — R97.20 ELEVATED PSA: Primary | ICD-10-CM

## 2022-11-01 RX ORDER — FAMOTIDINE 20 MG/1
TABLET, FILM COATED ORAL
Qty: 60 TABLET | Refills: 11 | Status: SHIPPED | OUTPATIENT
Start: 2022-11-01

## 2023-02-16 DIAGNOSIS — E11.9 TYPE 2 DIABETES MELLITUS WITHOUT COMPLICATION, WITHOUT LONG-TERM CURRENT USE OF INSULIN (HCC): ICD-10-CM

## 2023-02-17 RX ORDER — BLOOD SUGAR DIAGNOSTIC
STRIP MISCELLANEOUS
Qty: 100 STRIP | Refills: 3 | Status: SHIPPED | OUTPATIENT
Start: 2023-02-17

## 2023-03-06 RX ORDER — TADALAFIL 5 MG/1
TABLET ORAL
Qty: 30 TABLET | Refills: 0 | Status: SHIPPED | OUTPATIENT
Start: 2023-03-06

## 2023-03-24 RX ORDER — VALACYCLOVIR HYDROCHLORIDE 500 MG/1
TABLET, FILM COATED ORAL
Qty: 90 TABLET | Refills: 1 | Status: SHIPPED | OUTPATIENT
Start: 2023-03-24

## 2023-04-03 ENCOUNTER — OFFICE VISIT (OUTPATIENT)
Dept: FAMILY MEDICINE CLINIC | Age: 73
End: 2023-04-03
Payer: MEDICARE

## 2023-04-03 VITALS
TEMPERATURE: 97.5 F | WEIGHT: 200 LBS | BODY MASS INDEX: 27.09 KG/M2 | OXYGEN SATURATION: 98 % | HEIGHT: 72 IN | SYSTOLIC BLOOD PRESSURE: 136 MMHG | HEART RATE: 78 BPM | DIASTOLIC BLOOD PRESSURE: 78 MMHG | RESPIRATION RATE: 18 BRPM

## 2023-04-03 DIAGNOSIS — I10 ESSENTIAL HYPERTENSION: ICD-10-CM

## 2023-04-03 DIAGNOSIS — E78.5 DYSLIPIDEMIA: ICD-10-CM

## 2023-04-03 DIAGNOSIS — E11.9 TYPE 2 DIABETES MELLITUS WITHOUT COMPLICATION, WITHOUT LONG-TERM CURRENT USE OF INSULIN (HCC): Primary | ICD-10-CM

## 2023-04-03 DIAGNOSIS — G47.33 OSA ON CPAP: ICD-10-CM

## 2023-04-03 DIAGNOSIS — E11.9 TYPE 2 DIABETES MELLITUS WITHOUT COMPLICATION, WITHOUT LONG-TERM CURRENT USE OF INSULIN (HCC): ICD-10-CM

## 2023-04-03 DIAGNOSIS — R53.83 OTHER FATIGUE: ICD-10-CM

## 2023-04-03 DIAGNOSIS — Z99.89 OSA ON CPAP: ICD-10-CM

## 2023-04-03 LAB
ANION GAP SERPL CALCULATED.3IONS-SCNC: 11 MMOL/L (ref 7–16)
BASOPHILS # BLD: 0.06 E9/L (ref 0–0.2)
BASOPHILS NFR BLD: 0.7 % (ref 0–2)
BUN SERPL-MCNC: 15 MG/DL (ref 6–23)
CALCIUM SERPL-MCNC: 9.5 MG/DL (ref 8.6–10.2)
CHLORIDE SERPL-SCNC: 102 MMOL/L (ref 98–107)
CHOLESTEROL, TOTAL: 121 MG/DL (ref 0–199)
CO2 SERPL-SCNC: 25 MMOL/L (ref 22–29)
CREAT SERPL-MCNC: 1.2 MG/DL (ref 0.7–1.2)
CREATININE URINE POCT: 300
EOSINOPHIL # BLD: 0.1 E9/L (ref 0.05–0.5)
EOSINOPHIL NFR BLD: 1.1 % (ref 0–6)
ERYTHROCYTE [DISTWIDTH] IN BLOOD BY AUTOMATED COUNT: 13.4 FL (ref 11.5–15)
GLUCOSE SERPL-MCNC: 233 MG/DL (ref 74–99)
HBA1C MFR BLD: 8.3 %
HCT VFR BLD AUTO: 44.7 % (ref 37–54)
HDLC SERPL-MCNC: 55 MG/DL
HGB BLD-MCNC: 14.5 G/DL (ref 12.5–16.5)
IMM GRANULOCYTES # BLD: 0.03 E9/L
IMM GRANULOCYTES NFR BLD: 0.3 % (ref 0–5)
LDLC SERPL CALC-MCNC: 48 MG/DL (ref 0–99)
LYMPHOCYTES # BLD: 2.22 E9/L (ref 1.5–4)
LYMPHOCYTES NFR BLD: 24.4 % (ref 20–42)
MCH RBC QN AUTO: 29.2 PG (ref 26–35)
MCHC RBC AUTO-ENTMCNC: 32.4 % (ref 32–34.5)
MCV RBC AUTO: 89.9 FL (ref 80–99.9)
MICROALBUMIN/CREAT 24H UR: 30 MG/G{CREAT}
MICROALBUMIN/CREAT UR-RTO: <30
MONOCYTES # BLD: 0.65 E9/L (ref 0.1–0.95)
MONOCYTES NFR BLD: 7.1 % (ref 2–12)
NEUTROPHILS # BLD: 6.05 E9/L (ref 1.8–7.3)
NEUTS SEG NFR BLD: 66.4 % (ref 43–80)
PLATELET # BLD AUTO: 271 E9/L (ref 130–450)
PMV BLD AUTO: 11.2 FL (ref 7–12)
POTASSIUM SERPL-SCNC: 4.5 MMOL/L (ref 3.5–5)
RBC # BLD AUTO: 4.97 E12/L (ref 3.8–5.8)
SODIUM SERPL-SCNC: 138 MMOL/L (ref 132–146)
TRIGL SERPL-MCNC: 91 MG/DL (ref 0–149)
VLDLC SERPL CALC-MCNC: 18 MG/DL
WBC # BLD: 9.1 E9/L (ref 4.5–11.5)

## 2023-04-03 PROCEDURE — 99214 OFFICE O/P EST MOD 30 MIN: CPT | Performed by: FAMILY MEDICINE

## 2023-04-03 PROCEDURE — 3052F HG A1C>EQUAL 8.0%<EQUAL 9.0%: CPT | Performed by: FAMILY MEDICINE

## 2023-04-03 PROCEDURE — 1123F ACP DISCUSS/DSCN MKR DOCD: CPT | Performed by: FAMILY MEDICINE

## 2023-04-03 PROCEDURE — 3017F COLORECTAL CA SCREEN DOC REV: CPT | Performed by: FAMILY MEDICINE

## 2023-04-03 PROCEDURE — G8427 DOCREV CUR MEDS BY ELIG CLIN: HCPCS | Performed by: FAMILY MEDICINE

## 2023-04-03 PROCEDURE — 83036 HEMOGLOBIN GLYCOSYLATED A1C: CPT | Performed by: FAMILY MEDICINE

## 2023-04-03 PROCEDURE — G8417 CALC BMI ABV UP PARAM F/U: HCPCS | Performed by: FAMILY MEDICINE

## 2023-04-03 PROCEDURE — 2022F DILAT RTA XM EVC RTNOPTHY: CPT | Performed by: FAMILY MEDICINE

## 2023-04-03 PROCEDURE — 82044 UR ALBUMIN SEMIQUANTITATIVE: CPT | Performed by: FAMILY MEDICINE

## 2023-04-03 PROCEDURE — 3078F DIAST BP <80 MM HG: CPT | Performed by: FAMILY MEDICINE

## 2023-04-03 PROCEDURE — 1036F TOBACCO NON-USER: CPT | Performed by: FAMILY MEDICINE

## 2023-04-03 PROCEDURE — 3075F SYST BP GE 130 - 139MM HG: CPT | Performed by: FAMILY MEDICINE

## 2023-04-03 RX ORDER — FOLIC ACID 1 MG/1
TABLET ORAL
Qty: 90 TABLET | Refills: 1 | Status: SHIPPED | OUTPATIENT
Start: 2023-04-03

## 2023-04-03 RX ORDER — GLIPIZIDE 5 MG/1
TABLET ORAL
Qty: 90 TABLET | Refills: 1 | Status: SHIPPED | OUTPATIENT
Start: 2023-04-03

## 2023-04-03 RX ORDER — ROSUVASTATIN CALCIUM 5 MG/1
TABLET, COATED ORAL
Qty: 90 TABLET | Refills: 1 | Status: SHIPPED | OUTPATIENT
Start: 2023-04-03

## 2023-04-03 RX ORDER — MELATONIN
Qty: 90 TABLET | Refills: 1 | Status: SHIPPED | OUTPATIENT
Start: 2023-04-03

## 2023-04-03 RX ORDER — LANCETS 30 GAUGE
1 EACH MISCELLANEOUS DAILY
Qty: 100 EACH | Refills: 3 | Status: SHIPPED | OUTPATIENT
Start: 2023-04-03

## 2023-04-03 RX ORDER — BLOOD SUGAR DIAGNOSTIC
STRIP MISCELLANEOUS
Qty: 100 STRIP | Refills: 3 | Status: SHIPPED | OUTPATIENT
Start: 2023-04-03

## 2023-04-03 RX ORDER — TADALAFIL 5 MG/1
5 TABLET ORAL DAILY PRN
Qty: 10 TABLET | Refills: 3 | Status: SHIPPED | OUTPATIENT
Start: 2023-04-03

## 2023-04-03 RX ORDER — FERROUS SULFATE 325(65) MG
TABLET ORAL
Qty: 90 TABLET | Refills: 1 | Status: SHIPPED | OUTPATIENT
Start: 2023-04-03

## 2023-04-03 SDOH — ECONOMIC STABILITY: FOOD INSECURITY: WITHIN THE PAST 12 MONTHS, YOU WORRIED THAT YOUR FOOD WOULD RUN OUT BEFORE YOU GOT MONEY TO BUY MORE.: NEVER TRUE

## 2023-04-03 SDOH — ECONOMIC STABILITY: HOUSING INSECURITY
IN THE LAST 12 MONTHS, WAS THERE A TIME WHEN YOU DID NOT HAVE A STEADY PLACE TO SLEEP OR SLEPT IN A SHELTER (INCLUDING NOW)?: NO

## 2023-04-03 SDOH — ECONOMIC STABILITY: INCOME INSECURITY: HOW HARD IS IT FOR YOU TO PAY FOR THE VERY BASICS LIKE FOOD, HOUSING, MEDICAL CARE, AND HEATING?: NOT HARD AT ALL

## 2023-04-03 SDOH — ECONOMIC STABILITY: FOOD INSECURITY: WITHIN THE PAST 12 MONTHS, THE FOOD YOU BOUGHT JUST DIDN'T LAST AND YOU DIDN'T HAVE MONEY TO GET MORE.: NEVER TRUE

## 2023-04-03 ASSESSMENT — ENCOUNTER SYMPTOMS
CHEST TIGHTNESS: 0
COUGH: 0
BLOOD IN STOOL: 0
RESPIRATORY NEGATIVE: 1
PHOTOPHOBIA: 0
NAUSEA: 0
ORTHOPNEA: 0
SINUS PAIN: 0
DIARRHEA: 0
FACIAL SWELLING: 0
BLURRED VISION: 0
RHINORRHEA: 0
STRIDOR: 0
EYE ITCHING: 0
EYE PAIN: 0
BACK PAIN: 0
ABDOMINAL DISTENTION: 0
EYE REDNESS: 0
EYE DISCHARGE: 0
VOICE CHANGE: 0
VISUAL CHANGE: 1
SORE THROAT: 0
ALLERGIC/IMMUNOLOGIC NEGATIVE: 1
TROUBLE SWALLOWING: 0
VOMITING: 0
RECTAL PAIN: 0
SHORTNESS OF BREATH: 0
ABDOMINAL PAIN: 0
CHOKING: 0
CONSTIPATION: 0
ANAL BLEEDING: 0
COLOR CHANGE: 0
SINUS PRESSURE: 0
WHEEZING: 0
APNEA: 0

## 2023-04-03 ASSESSMENT — PATIENT HEALTH QUESTIONNAIRE - PHQ9
SUM OF ALL RESPONSES TO PHQ QUESTIONS 1-9: 0
1. LITTLE INTEREST OR PLEASURE IN DOING THINGS: 0
SUM OF ALL RESPONSES TO PHQ9 QUESTIONS 1 & 2: 0
SUM OF ALL RESPONSES TO PHQ QUESTIONS 1-9: 0
2. FEELING DOWN, DEPRESSED OR HOPELESS: 0

## 2023-04-03 ASSESSMENT — LIFESTYLE VARIABLES
HOW MANY STANDARD DRINKS CONTAINING ALCOHOL DO YOU HAVE ON A TYPICAL DAY: PATIENT DOES NOT DRINK
HOW OFTEN DO YOU HAVE A DRINK CONTAINING ALCOHOL: NEVER

## 2023-04-03 NOTE — PROGRESS NOTES
wound. Allergic/Immunologic: Negative. Negative for environmental allergies, food allergies and immunocompromised state. Neurological:  Positive for weakness. Negative for dizziness, tremors, seizures, syncope, facial asymmetry, speech difficulty, light-headedness, numbness and headaches. Hematological: Negative. Negative for adenopathy. Does not bruise/bleed easily. Psychiatric/Behavioral: Negative. Negative for agitation, behavioral problems, confusion, decreased concentration, dysphoric mood, hallucinations, self-injury, sleep disturbance and suicidal ideas. The patient is not nervous/anxious and is not hyperactive. Past Medical/Surgical Hx;  Reviewed with patient      Diagnosis Date    Diabetes mellitus [facility])      Past Surgical History:   Procedure Laterality Date    COLONOSCOPY N/A 2021    COLONOSCOPY WITH BIOPSY performed by Luz Loco MD at [place] [address] N/A 2021    EGD BIOPSY performed by Luz Loco MD at [facility] ENDOSCOPY       Past Family Hx:  Reviewed with patient  History reviewed. No pertinent family history. Social Hx:  Reviewed with patient  Social History     Tobacco Use    Smoking status: Former     Packs/day: 1.50     Years: 25.00     Pack years: 37.50     Types: Cigarettes     Start date: 1961     Quit date: 1986     Years since quittin.2    Smokeless tobacco: Never   Substance Use Topics    Alcohol use: Not Currently       OBJECTIVE  /78   Pulse 78   Temp 97.5 °F (36.4 °C)   Resp 18   Ht 6' (1.829 m)   Wt 200 lb (90.7 kg)   SpO2 98%   BMI 27.12 kg/m²     Problem List:  Katelynn Orozco does not have any pertinent problems on file. PHYS EX:  Physical Exam  Vitals and nursing note reviewed. Constitutional:       General: He is not in acute distress. Appearance: Normal appearance. He is well-developed. He is not ill-appearing, toxic-appearing or diaphoretic.    HENT:      Head: Normocephalic and

## 2023-04-04 RX ORDER — TADALAFIL 5 MG/1
TABLET ORAL
Qty: 30 TABLET | Refills: 0 | OUTPATIENT
Start: 2023-04-04

## 2023-04-05 RX ORDER — ASPIRIN 81 MG/1
TABLET, COATED ORAL
Qty: 90 TABLET | Refills: 1 | Status: SHIPPED | OUTPATIENT
Start: 2023-04-05

## 2023-04-05 NOTE — TELEPHONE ENCOUNTER
Last seen 4/3/2023  Next appt Visit date not found    Electronically signed by Anil Polk LPN on 6/9/08 at 5:55 AM EDT

## 2023-05-09 DIAGNOSIS — E11.9 TYPE 2 DIABETES MELLITUS WITHOUT COMPLICATION, WITHOUT LONG-TERM CURRENT USE OF INSULIN (HCC): ICD-10-CM

## 2023-05-10 RX ORDER — GLIPIZIDE 5 MG/1
TABLET ORAL
Qty: 90 TABLET | Refills: 1 | Status: SHIPPED | OUTPATIENT
Start: 2023-05-10

## 2023-05-10 NOTE — TELEPHONE ENCOUNTER
Last seen 4/3/2023  Next appt Visit date not found    Electronically signed by Riaz Saavedra LPN on 0/01/93 at 12:54 PM EDT

## 2023-05-15 RX ORDER — FERROUS SULFATE 325(65) MG
TABLET ORAL
Qty: 90 TABLET | Refills: 1 | OUTPATIENT
Start: 2023-05-15

## 2023-06-01 NOTE — TELEPHONE ENCOUNTER
Last Appointment:  4/3/2023  No future appointments.      Electronically signed by Jacque Addison LPN on 1/6/12 at 5:80 AM EDT

## 2023-06-02 RX ORDER — FERROUS SULFATE 325(65) MG
TABLET ORAL
Qty: 90 TABLET | Refills: 1 | Status: SHIPPED | OUTPATIENT
Start: 2023-06-02

## 2023-07-06 NOTE — TELEPHONE ENCOUNTER
Last Appointment:  4/3/2023  No future appointments.      Electronically signed by Elisabet Kyle LPN on 8/5/41 at 22:44 AM EDT

## 2023-07-07 RX ORDER — VALACYCLOVIR HYDROCHLORIDE 500 MG/1
TABLET, FILM COATED ORAL
Qty: 90 TABLET | Refills: 1 | Status: SHIPPED | OUTPATIENT
Start: 2023-07-07

## 2023-10-02 RX ORDER — ASPIRIN 81 MG/1
TABLET, COATED ORAL
Qty: 90 TABLET | Refills: 1 | Status: SHIPPED | OUTPATIENT
Start: 2023-10-02

## 2023-10-25 RX ORDER — FAMOTIDINE 20 MG/1
TABLET, FILM COATED ORAL
Qty: 180 TABLET | Refills: 3 | OUTPATIENT
Start: 2023-10-25

## 2023-11-24 NOTE — TELEPHONE ENCOUNTER
Last seen 4/3/2023  Next appt Visit date not found    Electronically signed by Tim Vaughn on 11/24/23 at 10:39 AM EST

## 2023-11-25 RX ORDER — FERROUS SULFATE 325(65) MG
TABLET ORAL
Qty: 90 TABLET | Refills: 1 | Status: SHIPPED | OUTPATIENT
Start: 2023-11-25

## 2024-01-05 RX ORDER — VALACYCLOVIR HYDROCHLORIDE 500 MG/1
TABLET, FILM COATED ORAL
Qty: 30 TABLET | Refills: 0 | Status: SHIPPED | OUTPATIENT
Start: 2024-01-05

## 2024-01-28 DIAGNOSIS — E11.9 TYPE 2 DIABETES MELLITUS WITHOUT COMPLICATION, WITHOUT LONG-TERM CURRENT USE OF INSULIN (HCC): ICD-10-CM

## 2024-01-28 DIAGNOSIS — E78.5 DYSLIPIDEMIA: ICD-10-CM

## 2024-01-29 RX ORDER — ROSUVASTATIN CALCIUM 5 MG/1
TABLET, COATED ORAL
Qty: 30 TABLET | Refills: 0 | Status: SHIPPED | OUTPATIENT
Start: 2024-01-29

## 2024-01-29 RX ORDER — FOLIC ACID 1 MG/1
TABLET ORAL
Qty: 30 TABLET | Refills: 0 | Status: SHIPPED | OUTPATIENT
Start: 2024-01-29

## 2024-01-31 DIAGNOSIS — E11.9 TYPE 2 DIABETES MELLITUS WITHOUT COMPLICATION, WITHOUT LONG-TERM CURRENT USE OF INSULIN (HCC): ICD-10-CM

## 2024-01-31 RX ORDER — GLIPIZIDE 5 MG/1
TABLET ORAL
Qty: 90 TABLET | Refills: 0 | Status: SHIPPED | OUTPATIENT
Start: 2024-01-31

## 2024-01-31 NOTE — TELEPHONE ENCOUNTER
Last appt- 04/03/2023  Future Appt-not scheduled    Last refill- 05/10/2023      Visit info   04/03/2023  Type 2 diabetes mellitus without complication, without long-term current use of insulin (HCC)  -     POCT glycosylated hemoglobin (Hb A1C)  -     POCT Microalbumin  -     glipiZIDE (GLUCOTROL) 5 MG tablet; TAKE 1 TABLET EVERY DAY  -     folic acid (FOLVITE) 1 MG tablet; TAKE 1 TABLET EVERY DAY  -     metFORMIN (GLUCOPHAGE) 500 MG tablet; TAKE 1 TABLET TWICE DAILY WITH MEALS  -     vitamin D3 (CHOLECALCIFEROL) 25 MCG (1000 UT) TABS tablet; TAKE 1 TABLET BY MOUTH EVERY DAY  -     Lancets MISC; 1 each by Does not apply route daily  -     blood glucose test strips (ACCU-CHEK GUIDE) strip; TEST BLOOD SUGAR AS NEEDED  -     Basic Metabolic Panel; Future  -     CBC with Auto Differential; Future     ---VASCULAR PANEL  A) ASA, plavix, aggrenox  B) coumadin, pletal, tzd, STATIN  C) ace, hctz, FOLIC, ccb  D) cannikinumab, fish oils      ---CARDIAC---ASA, ace, beta, STATIN, hctz, ( ccb )      A) ace or arb  B) lipitor ( 40-80 ) or CRESTOR 5   C) glp-1 or sglt 2

## 2024-02-02 RX ORDER — VALACYCLOVIR HYDROCHLORIDE 500 MG/1
TABLET, FILM COATED ORAL
Qty: 30 TABLET | Refills: 0 | Status: SHIPPED | OUTPATIENT
Start: 2024-02-02

## 2024-02-28 DIAGNOSIS — E11.9 TYPE 2 DIABETES MELLITUS WITHOUT COMPLICATION, WITHOUT LONG-TERM CURRENT USE OF INSULIN (HCC): ICD-10-CM

## 2024-02-28 DIAGNOSIS — E78.5 DYSLIPIDEMIA: ICD-10-CM

## 2024-02-28 RX ORDER — FOLIC ACID 1 MG/1
TABLET ORAL
Qty: 30 TABLET | Refills: 0 | Status: SHIPPED | OUTPATIENT
Start: 2024-02-28

## 2024-02-28 RX ORDER — VALACYCLOVIR HYDROCHLORIDE 500 MG/1
TABLET, FILM COATED ORAL
Qty: 30 TABLET | Refills: 0 | Status: SHIPPED | OUTPATIENT
Start: 2024-02-28

## 2024-02-28 RX ORDER — ROSUVASTATIN CALCIUM 5 MG/1
TABLET, COATED ORAL
Qty: 30 TABLET | Refills: 0 | Status: SHIPPED | OUTPATIENT
Start: 2024-02-28

## 2024-02-28 NOTE — TELEPHONE ENCOUNTER
Last Appointment:  4/3/2023  Future Appointments   Date Time Provider Department Center   3/1/2024  9:15 AM Sunday Gusman, DO [facility]

## 2024-03-01 ENCOUNTER — OFFICE VISIT (OUTPATIENT)
Dept: FAMILY MEDICINE CLINIC | Age: 74
End: 2024-03-01
Payer: COMMERCIAL

## 2024-03-01 VITALS
DIASTOLIC BLOOD PRESSURE: 70 MMHG | WEIGHT: 194.6 LBS | HEIGHT: 72 IN | BODY MASS INDEX: 26.36 KG/M2 | TEMPERATURE: 97.6 F | RESPIRATION RATE: 18 BRPM | HEART RATE: 71 BPM | SYSTOLIC BLOOD PRESSURE: 116 MMHG | OXYGEN SATURATION: 92 %

## 2024-03-01 DIAGNOSIS — E78.5 DYSLIPIDEMIA: ICD-10-CM

## 2024-03-01 DIAGNOSIS — Z12.5 SCREENING PSA (PROSTATE SPECIFIC ANTIGEN): ICD-10-CM

## 2024-03-01 DIAGNOSIS — Z00.00 MEDICARE ANNUAL WELLNESS VISIT, SUBSEQUENT: ICD-10-CM

## 2024-03-01 DIAGNOSIS — I10 ESSENTIAL HYPERTENSION: ICD-10-CM

## 2024-03-01 DIAGNOSIS — R53.83 OTHER FATIGUE: ICD-10-CM

## 2024-03-01 DIAGNOSIS — Z00.00 ENCOUNTER FOR MEDICARE ANNUAL WELLNESS EXAM: Primary | ICD-10-CM

## 2024-03-01 DIAGNOSIS — E11.9 TYPE 2 DIABETES MELLITUS WITHOUT COMPLICATION, WITHOUT LONG-TERM CURRENT USE OF INSULIN (HCC): ICD-10-CM

## 2024-03-01 LAB
ABSOLUTE IMMATURE GRANULOCYTE: <0.03 K/UL (ref 0–0.58)
ALBUMIN SERPL-MCNC: 4.4 G/DL (ref 3.5–5.2)
ALP BLD-CCNC: 80 U/L (ref 40–129)
ALT SERPL-CCNC: 12 U/L (ref 0–40)
ANION GAP SERPL CALCULATED.3IONS-SCNC: 12 MMOL/L (ref 7–16)
AST SERPL-CCNC: 17 U/L (ref 0–39)
BASOPHILS ABSOLUTE: 0.08 K/UL (ref 0–0.2)
BASOPHILS RELATIVE PERCENT: 1 % (ref 0–2)
BILIRUB SERPL-MCNC: 0.4 MG/DL (ref 0–1.2)
BUN BLDV-MCNC: 14 MG/DL (ref 6–23)
CALCIUM SERPL-MCNC: 9.4 MG/DL (ref 8.6–10.2)
CHLORIDE BLD-SCNC: 106 MMOL/L (ref 98–107)
CHOLESTEROL: 140 MG/DL
CO2: 24 MMOL/L (ref 22–29)
CREAT SERPL-MCNC: 1 MG/DL (ref 0.7–1.2)
EOSINOPHILS ABSOLUTE: 0.16 K/UL (ref 0.05–0.5)
EOSINOPHILS RELATIVE PERCENT: 2 % (ref 0–6)
GFR SERPL CREATININE-BSD FRML MDRD: >60 ML/MIN/1.73M2
GLUCOSE BLD-MCNC: 208 MG/DL (ref 74–99)
HBA1C MFR BLD: 8.1 %
HCT VFR BLD CALC: 45.4 % (ref 37–54)
HDLC SERPL-MCNC: 48 MG/DL
HEMOGLOBIN: 15 G/DL (ref 12.5–16.5)
IMMATURE GRANULOCYTES: 0 % (ref 0–5)
LDL CHOLESTEROL: 81 MG/DL
LYMPHOCYTES ABSOLUTE: 2.63 K/UL (ref 1.5–4)
LYMPHOCYTES RELATIVE PERCENT: 28 % (ref 20–42)
MCH RBC QN AUTO: 29.8 PG (ref 26–35)
MCHC RBC AUTO-ENTMCNC: 33 G/DL (ref 32–34.5)
MCV RBC AUTO: 90.3 FL (ref 80–99.9)
MONOCYTES ABSOLUTE: 0.78 K/UL (ref 0.1–0.95)
MONOCYTES RELATIVE PERCENT: 8 % (ref 2–12)
NEUTROPHILS ABSOLUTE: 5.63 K/UL (ref 1.8–7.3)
NEUTROPHILS RELATIVE PERCENT: 61 % (ref 43–80)
PDW BLD-RTO: 14.4 % (ref 11.5–15)
PLATELET # BLD: 262 K/UL (ref 130–450)
PMV BLD AUTO: 12 FL (ref 7–12)
POTASSIUM SERPL-SCNC: 5.1 MMOL/L (ref 3.5–5)
PROSTATE SPECIFIC ANTIGEN: 1.91 NG/ML (ref 0–4)
RBC # BLD: 5.03 M/UL (ref 3.8–5.8)
SODIUM BLD-SCNC: 142 MMOL/L (ref 132–146)
TOTAL PROTEIN: 6.9 G/DL (ref 6.4–8.3)
TRIGL SERPL-MCNC: 55 MG/DL
TSH SERPL DL<=0.05 MIU/L-ACNC: 1.07 UIU/ML (ref 0.27–4.2)
URIC ACID: 4.6 MG/DL (ref 3.4–7)
VLDLC SERPL CALC-MCNC: 11 MG/DL
WBC # BLD: 9.3 K/UL (ref 4.5–11.5)

## 2024-03-01 PROCEDURE — 36415 COLL VENOUS BLD VENIPUNCTURE: CPT | Performed by: FAMILY MEDICINE

## 2024-03-01 PROCEDURE — 3052F HG A1C>EQUAL 8.0%<EQUAL 9.0%: CPT | Performed by: FAMILY MEDICINE

## 2024-03-01 PROCEDURE — 83036 HEMOGLOBIN GLYCOSYLATED A1C: CPT | Performed by: FAMILY MEDICINE

## 2024-03-01 PROCEDURE — 3078F DIAST BP <80 MM HG: CPT | Performed by: FAMILY MEDICINE

## 2024-03-01 PROCEDURE — 1123F ACP DISCUSS/DSCN MKR DOCD: CPT | Performed by: FAMILY MEDICINE

## 2024-03-01 PROCEDURE — 3074F SYST BP LT 130 MM HG: CPT | Performed by: FAMILY MEDICINE

## 2024-03-01 PROCEDURE — G0439 PPPS, SUBSEQ VISIT: HCPCS | Performed by: FAMILY MEDICINE

## 2024-03-01 ASSESSMENT — PATIENT HEALTH QUESTIONNAIRE - PHQ9
SUM OF ALL RESPONSES TO PHQ QUESTIONS 1-9: 0
SUM OF ALL RESPONSES TO PHQ QUESTIONS 1-9: 0
2. FEELING DOWN, DEPRESSED OR HOPELESS: 0
SUM OF ALL RESPONSES TO PHQ9 QUESTIONS 1 & 2: 0
SUM OF ALL RESPONSES TO PHQ QUESTIONS 1-9: 0
1. LITTLE INTEREST OR PLEASURE IN DOING THINGS: 0
SUM OF ALL RESPONSES TO PHQ QUESTIONS 1-9: 0

## 2024-03-01 ASSESSMENT — LIFESTYLE VARIABLES
HOW OFTEN DO YOU HAVE A DRINK CONTAINING ALCOHOL: NEVER
HOW MANY STANDARD DRINKS CONTAINING ALCOHOL DO YOU HAVE ON A TYPICAL DAY: PATIENT DOES NOT DRINK

## 2024-03-01 NOTE — PROGRESS NOTES
Venipuncture was obtained from left arm. Patient tolerated the procedure without complications or complaints. 1 failed attempt in right antecubital.    Electronically signed by Katerina Parham LPN on 3/1/24 at 10:39 AM EST   
route daily Yes Sunday Gusman DO   hydrocortisone (ANUSOL-HC) 2.5 % CREA rectal cream Apply bid Yes Sunday Gusman DO   CPAP Machine MISC by Does not apply route  Patient not taking: Reported on 3/1/2024  Provider, MD Caleb Breaux (Including outside providers/suppliers regularly involved in providing care):   Patient Care Team:  Sunday Gusman DO as PCP - General (Family Medicine)  Sunday Gusman DO as PCP - Empaneled Provider     Reviewed and updated this visit:  Tobacco  Allergies  Meds  Problems  Med Hx  Surg Hx  Soc Hx  Fam Hx

## 2024-03-25 DIAGNOSIS — E78.5 DYSLIPIDEMIA: ICD-10-CM

## 2024-03-25 RX ORDER — ROSUVASTATIN CALCIUM 5 MG/1
5 TABLET, COATED ORAL NIGHTLY
Qty: 30 TABLET | Refills: 0 | Status: SHIPPED | OUTPATIENT
Start: 2024-03-25

## 2024-03-25 NOTE — TELEPHONE ENCOUNTER
Last seen 3/1/2024  Next appt 9/6/2024    Electronically signed by BETY ARNOLD MA on 3/25/24 at 10:30 AM EDT

## 2024-03-26 DIAGNOSIS — E11.9 TYPE 2 DIABETES MELLITUS WITHOUT COMPLICATION, WITHOUT LONG-TERM CURRENT USE OF INSULIN (HCC): ICD-10-CM

## 2024-03-26 RX ORDER — VALACYCLOVIR HYDROCHLORIDE 500 MG/1
TABLET, FILM COATED ORAL
Qty: 30 TABLET | Refills: 0 | Status: SHIPPED | OUTPATIENT
Start: 2024-03-26

## 2024-03-26 RX ORDER — FOLIC ACID 1 MG/1
TABLET ORAL
Qty: 30 TABLET | Refills: 0 | Status: SHIPPED | OUTPATIENT
Start: 2024-03-26

## 2024-03-26 NOTE — TELEPHONE ENCOUNTER
Last Appointment:  3/1/2024  Future Appointments   Date Time Provider Department Center   9/6/2024  8:30 AM Sunday Gusman DO [facility]   3/7/2025  8:30 AM Sunday Gusman DO [facility]

## 2024-04-01 DIAGNOSIS — E11.9 TYPE 2 DIABETES MELLITUS WITHOUT COMPLICATION, WITHOUT LONG-TERM CURRENT USE OF INSULIN (HCC): ICD-10-CM

## 2024-04-01 RX ORDER — ASPIRIN 81 MG/1
TABLET, COATED ORAL
Qty: 90 TABLET | Refills: 0 | Status: SHIPPED | OUTPATIENT
Start: 2024-04-01

## 2024-04-01 NOTE — TELEPHONE ENCOUNTER
Last Appointment:  3/1/2024  Future Appointments   Date Time Provider Department Center   9/6/2024  8:30 AM Sunday Gusman DO [facility]   3/7/2025  8:30 AM Sunday Gusman DO [facility]

## 2024-04-02 RX ORDER — FAMOTIDINE 20 MG/1
TABLET, FILM COATED ORAL
Qty: 180 TABLET | Refills: 3 | Status: SHIPPED | OUTPATIENT
Start: 2024-04-02

## 2024-04-02 RX ORDER — VALACYCLOVIR HYDROCHLORIDE 500 MG/1
TABLET, FILM COATED ORAL
Qty: 30 TABLET | Refills: 0 | Status: SHIPPED | OUTPATIENT
Start: 2024-04-02

## 2024-04-02 NOTE — TELEPHONE ENCOUNTER
Last seen 3/1/2024  Next appt 9/6/2024    Electronically signed by BETY ARNOLD MA on 4/2/24 at 8:14 AM EDT

## 2024-04-23 DIAGNOSIS — E78.5 DYSLIPIDEMIA: ICD-10-CM

## 2024-04-23 DIAGNOSIS — E11.9 TYPE 2 DIABETES MELLITUS WITHOUT COMPLICATION, WITHOUT LONG-TERM CURRENT USE OF INSULIN (HCC): ICD-10-CM

## 2024-04-23 RX ORDER — ROSUVASTATIN CALCIUM 5 MG/1
5 TABLET, COATED ORAL NIGHTLY
Qty: 90 TABLET | Refills: 0 | Status: SHIPPED | OUTPATIENT
Start: 2024-04-23

## 2024-04-23 RX ORDER — FOLIC ACID 1 MG/1
TABLET ORAL
Qty: 90 TABLET | Refills: 0 | Status: SHIPPED | OUTPATIENT
Start: 2024-04-23

## 2024-04-23 NOTE — TELEPHONE ENCOUNTER
Last Appointment:  3/1/2024  Future Appointments   Date Time Provider Department Center   9/6/2024  8:30 AM Sunday Gusman DO [facility]   3/7/2025  8:30 AM Sunday Gusman DO [facility]

## 2024-07-03 RX ORDER — ASPIRIN 81 MG/1
TABLET, COATED ORAL
Qty: 90 TABLET | Refills: 0 | Status: SHIPPED | OUTPATIENT
Start: 2024-07-03

## 2024-07-03 NOTE — TELEPHONE ENCOUNTER
Last Appointment:  3/1/2024  Future Appointments   Date Time Provider Department Center   9/6/2024  8:30 AM Sunday Gusman DO [facility]   3/7/2025  8:30 AM Sunday Gusman DO [facility]

## 2024-07-20 DIAGNOSIS — E78.5 DYSLIPIDEMIA: ICD-10-CM

## 2024-07-20 DIAGNOSIS — E11.9 TYPE 2 DIABETES MELLITUS WITHOUT COMPLICATION, WITHOUT LONG-TERM CURRENT USE OF INSULIN (HCC): ICD-10-CM

## 2024-07-22 RX ORDER — FOLIC ACID 1 MG/1
TABLET ORAL
Qty: 90 TABLET | Refills: 1 | Status: SHIPPED | OUTPATIENT
Start: 2024-07-22

## 2024-07-22 RX ORDER — ROSUVASTATIN CALCIUM 5 MG/1
5 TABLET, COATED ORAL NIGHTLY
Qty: 90 TABLET | Refills: 1 | Status: SHIPPED | OUTPATIENT
Start: 2024-07-22

## 2024-07-22 RX ORDER — VALACYCLOVIR HYDROCHLORIDE 500 MG/1
TABLET, FILM COATED ORAL
Qty: 30 TABLET | Refills: 2 | Status: SHIPPED | OUTPATIENT
Start: 2024-07-22

## 2024-07-22 NOTE — TELEPHONE ENCOUNTER
Last Appointment:  3/1/2024  Future Appointments   Date Time Provider Department Center   9/6/2024  8:30 AM Sunday Gusman DO [facility]   3/7/2025  8:30 AM Sunday Gusman DO [facility]

## 2024-07-22 NOTE — TELEPHONE ENCOUNTER
Last Appointment:  3/1/2024  Future Appointments   Date Time Provider Department Center   9/6/2024  8:30 AM Sunday Gusman DO [facility]   3/7/2025  8:30 AM Sunday Gusman DO [facility]

## 2024-08-23 NOTE — TELEPHONE ENCOUNTER
[id number]    Pt seen/examined; consult dictated    A:  Hyponatremia- improved. Hx of SIADH; mild. asymptomatic  Hyperkalemia- likely due to blood Tx + mild JODY; need to exclude hypoaldo and Adrenal insuff  Mild JODY- likely due to ROSALIE (from CT chest w contrast on 7-17). Previous JODY had just resolved  HTN  Hx of R Renal artery stent  GI bleed  COPD/chronic lung changes on CXR and CT chest- might be contributory cause of SIADH  CAD  R ankle tib/fib fracture    P:  Increase IV NS to 50 ml/hr  Renal diet, when takes PO  No ACE-I or ARB  Prn blood Tx  Will give a dose of IV Lasix -which should also help with kaliuresis and help correct Na in the setting of SIADH (along with NS)  She is NPO at present. Fluid restriction when resumes PO of 1.2 L/day  Resume Norvasc 5 mg every day (lower than home dose) for HTN  Cardiology increase Metoprolol  Check UA with micro, Urine K  AM cortisol  Urine Na/Osm tomm am    Will follow.  Thanks for consult    Gayle Anguiano MD  [id number] [employer] Last Appointment:  3/1/2024  Future Appointments   Date Time Provider Department Center   9/6/2024  8:30 AM Sunday Gusman DO [facility]   3/7/2025  8:30 AM Sunday Gusman DO [facility]    Electronically signed by Katerina Parham LPN on 8/23/24 at 9:45 AM EDT

## 2024-08-24 RX ORDER — FERROUS SULFATE 325(65) MG
TABLET ORAL
Qty: 90 TABLET | Refills: 1 | Status: SHIPPED | OUTPATIENT
Start: 2024-08-24

## 2024-09-27 ENCOUNTER — TELEPHONE (OUTPATIENT)
Dept: FAMILY MEDICINE CLINIC | Age: 74
End: 2024-09-27

## 2024-09-27 ENCOUNTER — OFFICE VISIT (OUTPATIENT)
Dept: FAMILY MEDICINE CLINIC | Age: 74
End: 2024-09-27
Payer: COMMERCIAL

## 2024-09-27 VITALS
TEMPERATURE: 97.5 F | RESPIRATION RATE: 18 BRPM | DIASTOLIC BLOOD PRESSURE: 72 MMHG | WEIGHT: 193.2 LBS | HEIGHT: 72 IN | OXYGEN SATURATION: 99 % | HEART RATE: 55 BPM | BODY MASS INDEX: 26.17 KG/M2 | SYSTOLIC BLOOD PRESSURE: 122 MMHG

## 2024-09-27 DIAGNOSIS — E78.5 DYSLIPIDEMIA: ICD-10-CM

## 2024-09-27 DIAGNOSIS — E11.9 TYPE 2 DIABETES MELLITUS WITHOUT COMPLICATION, WITHOUT LONG-TERM CURRENT USE OF INSULIN (HCC): Primary | ICD-10-CM

## 2024-09-27 DIAGNOSIS — R35.0 BENIGN PROSTATIC HYPERPLASIA WITH URINARY FREQUENCY: ICD-10-CM

## 2024-09-27 DIAGNOSIS — I10 ESSENTIAL HYPERTENSION: ICD-10-CM

## 2024-09-27 DIAGNOSIS — N40.1 BENIGN PROSTATIC HYPERPLASIA WITH URINARY FREQUENCY: ICD-10-CM

## 2024-09-27 DIAGNOSIS — R53.83 OTHER FATIGUE: ICD-10-CM

## 2024-09-27 LAB
CREATININE URINE POCT: 300
HBA1C MFR BLD: 8 %
MICROALBUMIN/CREAT 24H UR: 30 MG/DL
MICROALBUMIN/CREAT UR-RTO: <30 MG/G

## 2024-09-27 PROCEDURE — 83036 HEMOGLOBIN GLYCOSYLATED A1C: CPT | Performed by: FAMILY MEDICINE

## 2024-09-27 PROCEDURE — 1123F ACP DISCUSS/DSCN MKR DOCD: CPT | Performed by: FAMILY MEDICINE

## 2024-09-27 PROCEDURE — 99214 OFFICE O/P EST MOD 30 MIN: CPT | Performed by: FAMILY MEDICINE

## 2024-09-27 PROCEDURE — 82044 UR ALBUMIN SEMIQUANTITATIVE: CPT | Performed by: FAMILY MEDICINE

## 2024-09-27 PROCEDURE — 3052F HG A1C>EQUAL 8.0%<EQUAL 9.0%: CPT | Performed by: FAMILY MEDICINE

## 2024-09-27 PROCEDURE — 3078F DIAST BP <80 MM HG: CPT | Performed by: FAMILY MEDICINE

## 2024-09-27 PROCEDURE — 3074F SYST BP LT 130 MM HG: CPT | Performed by: FAMILY MEDICINE

## 2024-09-27 RX ORDER — ORAL SEMAGLUTIDE 7 MG/1
7 TABLET ORAL DAILY
Qty: 30 TABLET | Refills: 0
Start: 2024-09-27

## 2024-09-27 RX ORDER — TADALAFIL 5 MG/1
5 TABLET ORAL DAILY PRN
Qty: 90 TABLET | Refills: 1 | Status: SHIPPED | OUTPATIENT
Start: 2024-09-27

## 2024-09-27 SDOH — ECONOMIC STABILITY: FOOD INSECURITY: WITHIN THE PAST 12 MONTHS, YOU WORRIED THAT YOUR FOOD WOULD RUN OUT BEFORE YOU GOT MONEY TO BUY MORE.: NEVER TRUE

## 2024-09-27 SDOH — ECONOMIC STABILITY: FOOD INSECURITY: WITHIN THE PAST 12 MONTHS, THE FOOD YOU BOUGHT JUST DIDN'T LAST AND YOU DIDN'T HAVE MONEY TO GET MORE.: NEVER TRUE

## 2024-09-27 SDOH — ECONOMIC STABILITY: INCOME INSECURITY: HOW HARD IS IT FOR YOU TO PAY FOR THE VERY BASICS LIKE FOOD, HOUSING, MEDICAL CARE, AND HEATING?: NOT HARD AT ALL

## 2024-09-27 ASSESSMENT — ENCOUNTER SYMPTOMS
RECTAL PAIN: 0
ABDOMINAL DISTENTION: 0
SINUS PAIN: 0
EYE PAIN: 0
RHINORRHEA: 0
ANAL BLEEDING: 0
TROUBLE SWALLOWING: 0
VISUAL CHANGE: 1
CONSTIPATION: 0
APNEA: 0
CHEST TIGHTNESS: 0
ORTHOPNEA: 0
STRIDOR: 0
CHOKING: 0
PHOTOPHOBIA: 0
RESPIRATORY NEGATIVE: 1
FACIAL SWELLING: 0
COUGH: 0
VOMITING: 0
EYE DISCHARGE: 0
BACK PAIN: 0
ABDOMINAL PAIN: 0
COLOR CHANGE: 0
SHORTNESS OF BREATH: 0
WHEEZING: 0
DIARRHEA: 0
SORE THROAT: 0
BLURRED VISION: 0
BLOOD IN STOOL: 0
EYE REDNESS: 0
NAUSEA: 0
VOICE CHANGE: 0
EYE ITCHING: 0
ALLERGIC/IMMUNOLOGIC NEGATIVE: 1
SINUS PRESSURE: 0

## 2024-09-27 NOTE — TELEPHONE ENCOUNTER
----- Message from Dr. Sunday Gusman, DO sent at 9/27/2024  2:31 PM EDT -----  He is 74 and diabetic  He should see a cardiologist for a good eam  May we set one up soon

## 2024-09-27 NOTE — TELEPHONE ENCOUNTER
Attempted to contact pt, no answer, left detailed vm.     Electronically signed by BETY ARNOLD MA on 9/27/24 at 3:50 PM EDT

## 2024-09-29 DIAGNOSIS — E11.9 TYPE 2 DIABETES MELLITUS WITHOUT COMPLICATION, WITHOUT LONG-TERM CURRENT USE OF INSULIN (HCC): ICD-10-CM

## 2024-09-30 ENCOUNTER — TELEPHONE (OUTPATIENT)
Dept: FAMILY MEDICINE CLINIC | Age: 74
End: 2024-09-30

## 2024-09-30 DIAGNOSIS — I10 ESSENTIAL HYPERTENSION: ICD-10-CM

## 2024-09-30 DIAGNOSIS — E78.5 DYSLIPIDEMIA: Primary | ICD-10-CM

## 2024-09-30 DIAGNOSIS — R53.83 OTHER FATIGUE: ICD-10-CM

## 2024-09-30 DIAGNOSIS — E11.9 TYPE 2 DIABETES MELLITUS WITHOUT COMPLICATION, WITHOUT LONG-TERM CURRENT USE OF INSULIN (HCC): ICD-10-CM

## 2024-09-30 RX ORDER — ASPIRIN 81 MG/1
TABLET, COATED ORAL
Qty: 90 TABLET | Refills: 0 | Status: SHIPPED | OUTPATIENT
Start: 2024-09-30

## 2024-09-30 NOTE — TELEPHONE ENCOUNTER
Last seen 9/27/2024  Next appt 11/22/2024    Requested Prescriptions     Pending Prescriptions Disp Refills    metFORMIN (GLUCOPHAGE) 500 MG tablet [Pharmacy Med Name: METFORMIN  MG TABLET] 180 tablet 1     Sig: TAKE 1 TABLET BY MOUTH TWICE A DAY WITH FOOD    ASPIRIN LOW DOSE 81 MG EC tablet [Pharmacy Med Name: ASPIRIN EC 81 MG TABLET] 90 tablet 0     Sig: TAKE 1 TABLET BY MOUTH EVERY DAY    Electronically signed by BETY ARNOLD MA on 9/30/24 at 8:32 AM EDT

## 2024-10-21 NOTE — TELEPHONE ENCOUNTER
Last Appointment:  9/27/2024  Future Appointments   Date Time Provider Department Center   11/22/2024  1:30 PM SCHEDULE, [facility]   12/6/2024 11:45 AM Prisca Bustamante MD [facility]   3/7/2025  8:30 AM Sunday Gusman, DO [facility] DEP   3/28/2025 10:30 AM Sunday Gusman, DO [facility] DE[facility]

## 2024-10-22 RX ORDER — VALACYCLOVIR HYDROCHLORIDE 500 MG/1
TABLET, FILM COATED ORAL
Qty: 90 TABLET | Refills: 0 | Status: SHIPPED | OUTPATIENT
Start: 2024-10-22

## 2024-11-22 ENCOUNTER — NURSE ONLY (OUTPATIENT)
Dept: FAMILY MEDICINE CLINIC | Age: 74
End: 2024-11-22
Payer: COMMERCIAL

## 2024-11-22 DIAGNOSIS — N40.1 BENIGN PROSTATIC HYPERPLASIA WITH URINARY FREQUENCY: ICD-10-CM

## 2024-11-22 DIAGNOSIS — R35.0 BENIGN PROSTATIC HYPERPLASIA WITH URINARY FREQUENCY: ICD-10-CM

## 2024-11-22 DIAGNOSIS — E11.9 TYPE 2 DIABETES MELLITUS WITHOUT COMPLICATION, WITHOUT LONG-TERM CURRENT USE OF INSULIN (HCC): ICD-10-CM

## 2024-11-22 DIAGNOSIS — Z12.5 SCREENING PSA (PROSTATE SPECIFIC ANTIGEN): ICD-10-CM

## 2024-11-22 DIAGNOSIS — E78.5 DYSLIPIDEMIA: ICD-10-CM

## 2024-11-22 DIAGNOSIS — R53.83 OTHER FATIGUE: ICD-10-CM

## 2024-11-22 DIAGNOSIS — I10 ESSENTIAL HYPERTENSION: ICD-10-CM

## 2024-11-22 LAB
ANION GAP SERPL CALCULATED.3IONS-SCNC: 15 MMOL/L (ref 7–16)
BASOPHILS ABSOLUTE: 0.08 K/UL (ref 0–0.2)
BASOPHILS RELATIVE PERCENT: 1 % (ref 0–2)
BUN BLDV-MCNC: 14 MG/DL (ref 6–23)
CALCIUM SERPL-MCNC: 9.5 MG/DL (ref 8.6–10.2)
CHLORIDE BLD-SCNC: 103 MMOL/L (ref 98–107)
CHOLESTEROL, TOTAL: 125 MG/DL
CO2: 24 MMOL/L (ref 22–29)
CREAT SERPL-MCNC: 1.2 MG/DL (ref 0.7–1.2)
EOSINOPHILS ABSOLUTE: 0.13 K/UL (ref 0.05–0.5)
EOSINOPHILS RELATIVE PERCENT: 2 % (ref 0–6)
GFR, ESTIMATED: 66 ML/MIN/1.73M2
GLUCOSE BLD-MCNC: 163 MG/DL (ref 74–99)
HCT VFR BLD CALC: 44.1 % (ref 37–54)
HDLC SERPL-MCNC: 51 MG/DL
HEMOGLOBIN: 14.4 G/DL (ref 12.5–16.5)
IMMATURE GRANULOCYTES %: 1 % (ref 0–5)
IMMATURE GRANULOCYTES ABSOLUTE: 0.04 K/UL (ref 0–0.58)
LDL CHOLESTEROL: 62 MG/DL
LYMPHOCYTES ABSOLUTE: 2.73 K/UL (ref 1.5–4)
LYMPHOCYTES RELATIVE PERCENT: 31 % (ref 20–42)
MCH RBC QN AUTO: 28.8 PG (ref 26–35)
MCHC RBC AUTO-ENTMCNC: 32.7 G/DL (ref 32–34.5)
MCV RBC AUTO: 88.2 FL (ref 80–99.9)
MONOCYTES ABSOLUTE: 0.74 K/UL (ref 0.1–0.95)
MONOCYTES RELATIVE PERCENT: 9 % (ref 2–12)
NEUTROPHILS ABSOLUTE: 5 K/UL (ref 1.8–7.3)
NEUTROPHILS RELATIVE PERCENT: 57 % (ref 43–80)
PDW BLD-RTO: 13.3 % (ref 11.5–15)
PLATELET # BLD: 271 K/UL (ref 130–450)
PMV BLD AUTO: 11.6 FL (ref 7–12)
POTASSIUM SERPL-SCNC: 4.7 MMOL/L (ref 3.5–5)
PROSTATE SPECIFIC ANTIGEN: 4.27 NG/ML (ref 0–4)
RBC # BLD: 5 M/UL (ref 3.8–5.8)
SODIUM BLD-SCNC: 142 MMOL/L (ref 132–146)
TRIGL SERPL-MCNC: 61 MG/DL
VLDLC SERPL CALC-MCNC: 12 MG/DL
WBC # BLD: 8.7 K/UL (ref 4.5–11.5)

## 2024-11-22 PROCEDURE — 36415 COLL VENOUS BLD VENIPUNCTURE: CPT | Performed by: FAMILY MEDICINE

## 2024-12-02 RX ORDER — FERROUS SULFATE 325(65) MG
TABLET ORAL
Qty: 90 TABLET | Refills: 1 | Status: SHIPPED | OUTPATIENT
Start: 2024-12-02

## 2024-12-02 NOTE — TELEPHONE ENCOUNTER
Name of Medication(s) Requested:  Requested Prescriptions     Pending Prescriptions Disp Refills    ferrous sulfate (IRON 325) 325 (65 Fe) MG tablet [Pharmacy Med Name: FERROUS SULFATE 325 MG TABLET] 90 tablet 1     Sig: TAKE 1 TABLET BY MOUTH EVERY DAY WITH BREAKFAST       Medication is on current medication list Yes    Dosage and directions were verified? Yes    Quantity verified: 90 day supply     Pharmacy Verified?  Yes    Last Appointment:  9/27/2024    Future appts:  Future Appointments   Date Time Provider Department Center   12/6/2024 11:45 AM [facility]   3/7/2025  8:30 AM Sunday Gusman, DO [facility] DEP   3/28/2025 10:30 AM Sunday Gusman, DO [facility] DE[facility]        (If no appt send self scheduling link. .[email or web address])  Scheduling request sent?     [] Yes  [x] No    Does patient need updated?  [] Yes  [x] No

## 2024-12-19 PROBLEM — U09.9 COVID-19 LONG HAULER: Status: RESOLVED | Noted: 2021-08-25 | Resolved: 2024-12-19

## 2024-12-19 PROBLEM — K44.9 HIATAL HERNIA WITHOUT GANGRENE AND OBSTRUCTION: Status: RESOLVED | Noted: 2021-01-15 | Resolved: 2024-12-19

## 2024-12-19 PROBLEM — K64.9 HEMORRHOID: Status: RESOLVED | Noted: 2020-12-28 | Resolved: 2024-12-19

## 2024-12-20 ENCOUNTER — OFFICE VISIT (OUTPATIENT)
Dept: CARDIOLOGY CLINIC | Age: 74
End: 2024-12-20
Payer: COMMERCIAL

## 2024-12-20 VITALS
SYSTOLIC BLOOD PRESSURE: 147 MMHG | WEIGHT: 191 LBS | DIASTOLIC BLOOD PRESSURE: 78 MMHG | BODY MASS INDEX: 26.74 KG/M2 | RESPIRATION RATE: 16 BRPM | HEIGHT: 71 IN | HEART RATE: 66 BPM

## 2024-12-20 DIAGNOSIS — I51.9 HEART PROBLEM: Primary | ICD-10-CM

## 2024-12-20 PROCEDURE — 3078F DIAST BP <80 MM HG: CPT | Performed by: INTERNAL MEDICINE

## 2024-12-20 PROCEDURE — 99203 OFFICE O/P NEW LOW 30 MIN: CPT | Performed by: INTERNAL MEDICINE

## 2024-12-20 PROCEDURE — 3077F SYST BP >= 140 MM HG: CPT | Performed by: INTERNAL MEDICINE

## 2024-12-20 PROCEDURE — 93000 ELECTROCARDIOGRAM COMPLETE: CPT | Performed by: INTERNAL MEDICINE

## 2024-12-20 PROCEDURE — 1159F MED LIST DOCD IN RCRD: CPT | Performed by: INTERNAL MEDICINE

## 2024-12-20 PROCEDURE — 1123F ACP DISCUSS/DSCN MKR DOCD: CPT | Performed by: INTERNAL MEDICINE

## 2024-12-20 PROCEDURE — 1160F RVW MEDS BY RX/DR IN RCRD: CPT | Performed by: INTERNAL MEDICINE

## 2024-12-20 NOTE — PROGRESS NOTES
Chief Complaint   Patient presents with    abnormal ekg       Patient Active Problem List    Diagnosis Date Noted    CUCA on CPAP     Polyp of duodenum     Large hiatal hernia     Adenomatous polyp of sigmoid colon     Essential hypertension 01/04/2021    Type 2 diabetes mellitus without complication, without long-term current use of insulin ([facility]) 12/28/2020       Current Outpatient Medications   Medication Sig Dispense Refill    ferrous sulfate (IRON 325) 325 (65 Fe) MG tablet TAKE 1 TABLET BY MOUTH EVERY DAY WITH BREAKFAST 90 tablet 1    valACYclovir (VALTREX) 500 MG tablet TAKE 1 TABLET BY MOUTH EVERY DAY 90 tablet 0    metFORMIN (GLUCOPHAGE) 500 MG tablet TAKE 1 TABLET BY MOUTH TWICE A DAY WITH FOOD 180 tablet 1    ASPIRIN LOW DOSE 81 MG EC tablet TAKE 1 TABLET BY MOUTH EVERY DAY 90 tablet 0    tadalafil (CIALIS) 5 MG tablet Take 1 tablet by mouth daily as needed for Erectile Dysfunction TAKE 1 TABLET BY MOUTH EVERY DAY AS NEEDED 90 tablet 1    Semaglutide (RYBELSUS) 7 MG TABS Take 7 mg by mouth daily 30 tablet 0    folic acid (FOLVITE) 1 MG tablet TAKE 1 TABLET BY MOUTH EVERY DAY 90 tablet 1    rosuvastatin (CRESTOR) 5 MG tablet TAKE 1 TABLET BY MOUTH EVERY DAY AT NIGHT 90 tablet 1    famotidine (PEPCID) 20 MG tablet TAKE 1 TABLET BY MOUTH TWICE A  tablet 3    glipiZIDE (GLUCOTROL) 5 MG tablet TAKE 1 TABLET EVERY DAY 90 tablet 0    hydrocortisone (ANUSOL-HC) 2.5 % CREA rectal cream Apply bid 28 g 3     No current facility-administered medications for this visit.        No Known Allergies    Vitals:    12/20/24 0846   BP: (!) 147/78   Pulse: 66   Resp: 16   Weight: 86.6 kg (191 lb)   Height: 1.803 m (5' 11\")                 SUBJECTIVE: Julian Chandler presents to the office today for consult - dr hendricks  Hx of DM, HLD     He complains of  no cardiac symptoms nor decline in functional capacity  and denies   dyspnea, exertional chest pressure/discomfort, fatigue, irregular heart beat, lower extremity edema,

## 2024-12-27 RX ORDER — ASPIRIN 81 MG/1
TABLET, COATED ORAL
Qty: 90 TABLET | Refills: 0 | Status: SHIPPED | OUTPATIENT
Start: 2024-12-27

## 2024-12-27 NOTE — TELEPHONE ENCOUNTER
Last seen 9/27/2024  Next appt 3/7/2025    Requested Prescriptions     Pending Prescriptions Disp Refills    ASPIRIN LOW DOSE 81 MG EC tablet [Pharmacy Med Name: ASPIRIN EC 81 MG TABLET] 90 tablet 0     Sig: TAKE 1 TABLET BY MOUTH EVERY DAY    Electronically signed by BETY ARNOLD MA on 12/27/24 at 7:39 AM EST

## 2025-01-24 DIAGNOSIS — E78.5 DYSLIPIDEMIA: ICD-10-CM

## 2025-01-24 RX ORDER — VALACYCLOVIR HYDROCHLORIDE 500 MG/1
TABLET, FILM COATED ORAL
Qty: 90 TABLET | Refills: 0 | Status: SHIPPED | OUTPATIENT
Start: 2025-01-24

## 2025-01-24 RX ORDER — ROSUVASTATIN CALCIUM 5 MG/1
5 TABLET, COATED ORAL NIGHTLY
Qty: 90 TABLET | Refills: 1 | Status: SHIPPED | OUTPATIENT
Start: 2025-01-24

## 2025-01-24 NOTE — TELEPHONE ENCOUNTER
Last seen 9/27/2024  Next appt 3/7/2025    Requested Prescriptions     Pending Prescriptions Disp Refills    rosuvastatin (CRESTOR) 5 MG tablet [Pharmacy Med Name: ROSUVASTATIN CALCIUM 5 MG TAB] 90 tablet 1     Sig: TAKE 1 TABLET BY MOUTH EVERY DAY AT NIGHT    valACYclovir (VALTREX) 500 MG tablet [Pharmacy Med Name: VALACYCLOVIR  MG TABLET] 90 tablet 0     Sig: TAKE 1 TABLET BY MOUTH EVERY DAY    Electronically signed by BETY ARNOLD MA on 1/24/25 at 8:10 AM EST

## 2025-02-08 DIAGNOSIS — E11.9 TYPE 2 DIABETES MELLITUS WITHOUT COMPLICATION, WITHOUT LONG-TERM CURRENT USE OF INSULIN (HCC): ICD-10-CM

## 2025-02-10 RX ORDER — FAMOTIDINE 20 MG/1
TABLET, FILM COATED ORAL
Qty: 180 TABLET | Refills: 1 | Status: SHIPPED | OUTPATIENT
Start: 2025-02-10

## 2025-02-10 NOTE — TELEPHONE ENCOUNTER
Last seen 9/27/2024  Next appt 3/7/2025    Requested Prescriptions     Pending Prescriptions Disp Refills    famotidine (PEPCID) 20 MG tablet [Pharmacy Med Name: FAMOTIDINE 20 MG TABLET] 180 tablet 1     Sig: TAKE 1 TABLET BY MOUTH TWICE A DAY    metFORMIN (GLUCOPHAGE) 500 MG tablet [Pharmacy Med Name: METFORMIN  MG TABLET] 180 tablet 1     Sig: TAKE 1 TABLET BY MOUTH TWICE A DAY WITH FOOD    Electronically signed by BETY ARNOLD MA on 2/10/25 at 9:02 AM EST

## 2025-03-01 DIAGNOSIS — E11.9 TYPE 2 DIABETES MELLITUS WITHOUT COMPLICATION, WITHOUT LONG-TERM CURRENT USE OF INSULIN (HCC): ICD-10-CM

## 2025-03-03 RX ORDER — FOLIC ACID 1 MG/1
TABLET ORAL
Qty: 90 TABLET | Refills: 1 | Status: SHIPPED | OUTPATIENT
Start: 2025-03-03

## 2025-03-03 NOTE — TELEPHONE ENCOUNTER
Last seen 9/27/2024  Next appt 3/7/2025    Requested Prescriptions     Pending Prescriptions Disp Refills    folic acid (FOLVITE) 1 MG tablet [Pharmacy Med Name: FOLIC ACID 1 MG TABLET] 90 tablet 1     Sig: TAKE 1 TABLET BY MOUTH EVERY DAY    Electronically signed by BETY ARNOLD MA on 3/3/25 at 8:13 AM EST

## 2025-03-17 RX ORDER — VALACYCLOVIR HYDROCHLORIDE 500 MG/1
500 TABLET, FILM COATED ORAL DAILY
Qty: 90 TABLET | Refills: 0 | Status: SHIPPED | OUTPATIENT
Start: 2025-03-17

## 2025-03-17 NOTE — TELEPHONE ENCOUNTER
Last seen 9/27/2024  Next appt 3/28/2025    Requested Prescriptions     Pending Prescriptions Disp Refills    valACYclovir (VALTREX) 500 MG tablet [Pharmacy Med Name: VALACYCLOVIR  MG TABLET] 90 tablet 0     Sig: TAKE 1 TABLET BY MOUTH EVERY DAY    Electronically signed by BETY ARNOLD MA on 3/17/25 at 8:11 AM EDT

## 2025-03-27 NOTE — TELEPHONE ENCOUNTER
Last seen 9/27/2024  Next appt 3/28/2025    Requested Prescriptions     Pending Prescriptions Disp Refills    ASPIRIN LOW DOSE 81 MG EC tablet [Pharmacy Med Name: ASPIRIN EC 81 MG TABLET] 90 tablet 0     Sig: TAKE 1 TABLET BY MOUTH EVERY DAY    Electronically signed by BETY ARNOLD MA on 3/27/25 at 7:59 AM EDT

## 2025-03-28 ENCOUNTER — OFFICE VISIT (OUTPATIENT)
Dept: FAMILY MEDICINE CLINIC | Age: 75
End: 2025-03-28
Payer: COMMERCIAL

## 2025-03-28 VITALS
OXYGEN SATURATION: 97 % | SYSTOLIC BLOOD PRESSURE: 116 MMHG | WEIGHT: 192.4 LBS | TEMPERATURE: 97.2 F | RESPIRATION RATE: 18 BRPM | DIASTOLIC BLOOD PRESSURE: 72 MMHG | BODY MASS INDEX: 26.94 KG/M2 | HEIGHT: 71 IN | HEART RATE: 70 BPM

## 2025-03-28 DIAGNOSIS — I10 ESSENTIAL HYPERTENSION: ICD-10-CM

## 2025-03-28 DIAGNOSIS — Z12.5 SCREENING PSA (PROSTATE SPECIFIC ANTIGEN): ICD-10-CM

## 2025-03-28 DIAGNOSIS — R53.83 OTHER FATIGUE: ICD-10-CM

## 2025-03-28 DIAGNOSIS — Z00.00 ENCOUNTER FOR MEDICARE ANNUAL WELLNESS EXAM: Primary | ICD-10-CM

## 2025-03-28 DIAGNOSIS — Z00.00 MEDICARE ANNUAL WELLNESS VISIT, SUBSEQUENT: ICD-10-CM

## 2025-03-28 DIAGNOSIS — E78.5 DYSLIPIDEMIA: ICD-10-CM

## 2025-03-28 DIAGNOSIS — E11.9 TYPE 2 DIABETES MELLITUS WITHOUT COMPLICATION, WITHOUT LONG-TERM CURRENT USE OF INSULIN: ICD-10-CM

## 2025-03-28 LAB — HBA1C MFR BLD: 8.3 %

## 2025-03-28 PROCEDURE — 1160F RVW MEDS BY RX/DR IN RCRD: CPT | Performed by: FAMILY MEDICINE

## 2025-03-28 PROCEDURE — G0439 PPPS, SUBSEQ VISIT: HCPCS | Performed by: FAMILY MEDICINE

## 2025-03-28 PROCEDURE — 83036 HEMOGLOBIN GLYCOSYLATED A1C: CPT | Performed by: FAMILY MEDICINE

## 2025-03-28 PROCEDURE — 1123F ACP DISCUSS/DSCN MKR DOCD: CPT | Performed by: FAMILY MEDICINE

## 2025-03-28 PROCEDURE — 3052F HG A1C>EQUAL 8.0%<EQUAL 9.0%: CPT | Performed by: FAMILY MEDICINE

## 2025-03-28 PROCEDURE — 3074F SYST BP LT 130 MM HG: CPT | Performed by: FAMILY MEDICINE

## 2025-03-28 PROCEDURE — 3078F DIAST BP <80 MM HG: CPT | Performed by: FAMILY MEDICINE

## 2025-03-28 PROCEDURE — 1159F MED LIST DOCD IN RCRD: CPT | Performed by: FAMILY MEDICINE

## 2025-03-28 RX ORDER — ASPIRIN 81 MG/1
81 TABLET, COATED ORAL DAILY
Qty: 90 TABLET | Refills: 0 | Status: SHIPPED | OUTPATIENT
Start: 2025-03-28

## 2025-03-28 SDOH — ECONOMIC STABILITY: FOOD INSECURITY: WITHIN THE PAST 12 MONTHS, YOU WORRIED THAT YOUR FOOD WOULD RUN OUT BEFORE YOU GOT MONEY TO BUY MORE.: NEVER TRUE

## 2025-03-28 SDOH — ECONOMIC STABILITY: FOOD INSECURITY: WITHIN THE PAST 12 MONTHS, THE FOOD YOU BOUGHT JUST DIDN'T LAST AND YOU DIDN'T HAVE MONEY TO GET MORE.: NEVER TRUE

## 2025-03-28 ASSESSMENT — PATIENT HEALTH QUESTIONNAIRE - PHQ9
SUM OF ALL RESPONSES TO PHQ QUESTIONS 1-9: 0
SUM OF ALL RESPONSES TO PHQ QUESTIONS 1-9: 0
1. LITTLE INTEREST OR PLEASURE IN DOING THINGS: NOT AT ALL
SUM OF ALL RESPONSES TO PHQ QUESTIONS 1-9: 0
2. FEELING DOWN, DEPRESSED OR HOPELESS: NOT AT ALL
SUM OF ALL RESPONSES TO PHQ QUESTIONS 1-9: 0

## 2025-03-28 NOTE — PATIENT INSTRUCTIONS

## 2025-03-28 NOTE — PROGRESS NOTES
Medicare Annual Wellness Visit    Julian Chandler is here for Medicare AWV (Pt here for his AWV) and Diabetes (A1c pended)    Assessment & Plan   Encounter for Medicare annual wellness exam    ---VASCULAR PANEL  A) ASA, plavix, aggrenox  B) coumadin, pletal, tzd, STATIN  C) ace, hctz, FOLIC, ccb  D) cannikinumab, fish oils     ---CARDIAC---ASA, ace, beta, STATIN, hctz, ( ccb )     A) ace or arb  B) lipitor ( 40-80 ) or  CRESTOR 5  C) GLP- 1  or sglt 2     Type 2 diabetes mellitus without complication, without long-term current use of insulin (HCC)  -     POCT glycosylated hemoglobin (Hb A1C)  -     Comprehensive Metabolic Panel; Future  -     CBC with Auto Differential; Future  -     Hemoglobin A1C; Future  --stable on current care planning  -- continue treatment as we are meeting goals     Dyslipidemia  -     Comprehensive Metabolic Panel; Future  -     Lipid Panel; Future  -     CBC with Auto Differential; Future  Other fatigue  -     TSH; Future  -     Uric Acid; Future  -     Comprehensive Metabolic Panel; Future  -     CBC with Auto Differential; Future  Screening PSA (prostate specific antigen)  -     Comprehensive Metabolic Panel; Future  -     CBC with Auto Differential; Future  -     PSA Screening; Future  Essential hypertension ---controlled   --patient is instructed on low to moderate sodium ( 2 to 2.5 grams ), daily    Also to increase potassium in the diet to about 3.5 grams daily    Literature is provided          No follow-ups on file.     Subjective   The following acute and/or chronic problems were also addressed today:    Patient's complete Health Risk Assessment and screening values have been reviewed and are found in Flowsheets. The following problems were reviewed today and where indicated follow up appointments were made and/or referrals ordered.    Positive Risk Factor Screenings with Interventions:                 Dentist Screen:  Have you seen the dentist within the past year?: (!)

## 2025-05-09 RX ORDER — FERROUS SULFATE 325(65) MG
1 TABLET ORAL
Qty: 90 TABLET | Refills: 1 | Status: SHIPPED | OUTPATIENT
Start: 2025-05-09

## 2025-06-02 DIAGNOSIS — N40.1 BENIGN PROSTATIC HYPERPLASIA WITH URINARY FREQUENCY: ICD-10-CM

## 2025-06-02 DIAGNOSIS — R35.0 BENIGN PROSTATIC HYPERPLASIA WITH URINARY FREQUENCY: ICD-10-CM

## 2025-06-02 RX ORDER — TADALAFIL 5 MG/1
5 TABLET ORAL DAILY PRN
Qty: 90 TABLET | Refills: 1 | Status: SHIPPED | OUTPATIENT
Start: 2025-06-02

## 2025-06-02 NOTE — TELEPHONE ENCOUNTER
Last Appointment:  3/28/2025  Future Appointments   Date Time Provider Department Center   10/3/2025  8:45 AM Sunday Gusman DO [facility] DE[facility]   4/3/2026  9:00 AM Sunday Gusman DO [facility]

## 2025-06-27 RX ORDER — ASPIRIN 81 MG/1
81 TABLET, COATED ORAL DAILY
Qty: 90 TABLET | Refills: 0 | Status: SHIPPED | OUTPATIENT
Start: 2025-06-27

## 2025-06-27 NOTE — TELEPHONE ENCOUNTER
Last Appointment:  3/28/2025  Future Appointments   Date Time Provider Department Center   10/3/2025  8:45 AM Sunday Gusman DO [facility] DE[facility]   4/3/2026  9:00 AM Sunday Gusman DO [facility]    Electronically signed by Katerina Parham LPN on 6/27/25 at 8:13 AM EDT

## 2025-07-19 DIAGNOSIS — E78.5 DYSLIPIDEMIA: ICD-10-CM

## 2025-07-21 NOTE — TELEPHONE ENCOUNTER
Last seen 3/28/2025  Next appt 10/3/2025    Requested Prescriptions     Pending Prescriptions Disp Refills    rosuvastatin (CRESTOR) 5 MG tablet [Pharmacy Med Name: ROSUVASTATIN CALCIUM 5 MG TAB] 90 tablet 1     Sig: TAKE 1 TABLET BY MOUTH EVERY DAY AT NIGHT    Electronically signed by BETY ARNOLD MA on 7/21/25 at 8:08 AM EDT

## 2025-07-22 RX ORDER — ROSUVASTATIN CALCIUM 5 MG/1
5 TABLET, COATED ORAL NIGHTLY
Qty: 90 TABLET | Refills: 1 | Status: SHIPPED | OUTPATIENT
Start: 2025-07-22

## 2025-08-09 DIAGNOSIS — E11.9 TYPE 2 DIABETES MELLITUS WITHOUT COMPLICATION, WITHOUT LONG-TERM CURRENT USE OF INSULIN (HCC): ICD-10-CM

## 2025-08-12 RX ORDER — FAMOTIDINE 20 MG/1
20 TABLET, FILM COATED ORAL 2 TIMES DAILY
Qty: 180 TABLET | Refills: 1 | Status: SHIPPED | OUTPATIENT
Start: 2025-08-12

## 2025-08-12 RX ORDER — VALACYCLOVIR HYDROCHLORIDE 500 MG/1
500 TABLET, FILM COATED ORAL DAILY
Qty: 90 TABLET | Refills: 0 | Status: SHIPPED | OUTPATIENT
Start: 2025-08-12

## 2025-09-05 ENCOUNTER — CLINICAL SUPPORT (OUTPATIENT)
Dept: FAMILY MEDICINE CLINIC | Age: 75
End: 2025-09-05
Payer: COMMERCIAL

## 2025-09-05 DIAGNOSIS — R53.83 OTHER FATIGUE: ICD-10-CM

## 2025-09-05 DIAGNOSIS — E11.9 TYPE 2 DIABETES MELLITUS WITHOUT COMPLICATION, WITHOUT LONG-TERM CURRENT USE OF INSULIN (HCC): ICD-10-CM

## 2025-09-05 DIAGNOSIS — E78.5 DYSLIPIDEMIA: ICD-10-CM

## 2025-09-05 DIAGNOSIS — Z12.5 SCREENING PSA (PROSTATE SPECIFIC ANTIGEN): ICD-10-CM

## 2025-09-05 DIAGNOSIS — I10 ESSENTIAL HYPERTENSION: Primary | ICD-10-CM

## 2025-09-05 LAB
ALBUMIN: 4.1 G/DL (ref 3.5–5.2)
ALP BLD-CCNC: 63 U/L (ref 40–129)
ALT SERPL-CCNC: 11 U/L (ref 0–50)
ANION GAP SERPL CALCULATED.3IONS-SCNC: 12 MMOL/L (ref 7–16)
AST SERPL-CCNC: 19 U/L (ref 0–50)
BASOPHILS ABSOLUTE: 0.08 K/UL (ref 0–0.2)
BASOPHILS RELATIVE PERCENT: 1 % (ref 0–2)
BILIRUB SERPL-MCNC: 0.3 MG/DL (ref 0–1.2)
BUN BLDV-MCNC: 18 MG/DL (ref 8–23)
CALCIUM SERPL-MCNC: 9.1 MG/DL (ref 8.8–10.2)
CHLORIDE BLD-SCNC: 105 MMOL/L (ref 98–107)
CHOLESTEROL, TOTAL: 105 MG/DL
CO2: 22 MMOL/L (ref 22–29)
CREAT SERPL-MCNC: 1.2 MG/DL (ref 0.7–1.2)
EOSINOPHILS ABSOLUTE: 0.18 K/UL (ref 0.05–0.5)
EOSINOPHILS RELATIVE PERCENT: 2 % (ref 0–6)
GFR, ESTIMATED: 63 ML/MIN/1.73M2
GLUCOSE BLD-MCNC: 116 MG/DL (ref 74–99)
HBA1C MFR BLD: 8.5 % (ref 4–5.6)
HCT VFR BLD CALC: 41.4 % (ref 37–54)
HDLC SERPL-MCNC: 41 MG/DL
HEMOGLOBIN: 13.6 G/DL (ref 12.5–16.5)
IMMATURE GRANULOCYTES %: 0 % (ref 0–5)
IMMATURE GRANULOCYTES ABSOLUTE: 0.03 K/UL (ref 0–0.58)
LDL CHOLESTEROL: 42 MG/DL
LYMPHOCYTES ABSOLUTE: 2.95 K/UL (ref 1.5–4)
LYMPHOCYTES RELATIVE PERCENT: 29 % (ref 20–42)
MCH RBC QN AUTO: 29.1 PG (ref 26–35)
MCHC RBC AUTO-ENTMCNC: 32.9 G/DL (ref 32–34.5)
MCV RBC AUTO: 88.7 FL (ref 80–99.9)
MONOCYTES ABSOLUTE: 0.84 K/UL (ref 0.1–0.95)
MONOCYTES RELATIVE PERCENT: 8 % (ref 2–12)
NEUTROPHILS ABSOLUTE: 6.24 K/UL (ref 1.8–7.3)
NEUTROPHILS RELATIVE PERCENT: 61 % (ref 43–80)
PDW BLD-RTO: 13.8 % (ref 11.5–15)
PLATELET # BLD: 241 K/UL (ref 130–450)
PMV BLD AUTO: 11.7 FL (ref 7–12)
POTASSIUM SERPL-SCNC: 4.1 MMOL/L (ref 3.5–5.1)
PROSTATE SPECIFIC ANTIGEN: 2.23 NG/ML (ref 0–4)
RBC # BLD: 4.67 M/UL (ref 3.8–5.8)
SODIUM BLD-SCNC: 140 MMOL/L (ref 136–145)
TOTAL PROTEIN: 6.6 G/DL (ref 6.4–8.3)
TRIGL SERPL-MCNC: 109 MG/DL
TSH SERPL DL<=0.05 MIU/L-ACNC: 2.52 UIU/ML (ref 0.27–4.2)
URIC ACID: 5.4 MG/DL (ref 3.4–7)
VLDLC SERPL CALC-MCNC: 22 MG/DL
WBC # BLD: 10.3 K/UL (ref 4.5–11.5)

## 2025-09-05 PROCEDURE — 36415 COLL VENOUS BLD VENIPUNCTURE: CPT | Performed by: FAMILY MEDICINE

## (undated) DEVICE — FORCEPS BX L240CM JAW DIA2.4MM ORNG L CAP W/ NDL DISP RAD

## (undated) DEVICE — BITEBLOCK 54FR W/ DENT RIM BLOX

## (undated) DEVICE — CONTAINER SPEC 60ML PH 7NEUTRAL BUFF FRMLN RDY TO USE

## (undated) DEVICE — DEFENDO AIR WATER SUCTION AND BIOPSY VALVE KIT FOR  OLYMPUS: Brand: DEFENDO AIR/WATER/SUCTION AND BIOPSY VALVE

## (undated) DEVICE — FORCEPS BX L160CM DIA8MM GRSP DISECT CUP TIP NONLOCKING ROT

## (undated) DEVICE — CONNECTOR IRRIGATION AUXILIARY H2O JET W/ PRT MTL THRD HYDR

## (undated) DEVICE — GAUZE,SPONGE,POST-OP,4X3,STRL,LF: Brand: MEDLINE

## (undated) DEVICE — Z DISCONTINUED NO SUB IDED TUBING ETCO2 AD L6.5FT NSL ORAL CVD PRNG NONFLARED TIP OVR